# Patient Record
Sex: FEMALE | Race: WHITE | Employment: OTHER | URBAN - METROPOLITAN AREA
[De-identification: names, ages, dates, MRNs, and addresses within clinical notes are randomized per-mention and may not be internally consistent; named-entity substitution may affect disease eponyms.]

---

## 2017-02-10 ENCOUNTER — APPOINTMENT (OUTPATIENT)
Dept: CT IMAGING | Facility: HOSPITAL | Age: 62
End: 2017-02-10
Attending: EMERGENCY MEDICINE
Payer: COMMERCIAL

## 2017-02-10 ENCOUNTER — HOSPITAL ENCOUNTER (EMERGENCY)
Facility: HOSPITAL | Age: 62
Discharge: HOME | End: 2017-02-10
Attending: EMERGENCY MEDICINE
Payer: COMMERCIAL

## 2017-02-10 ENCOUNTER — APPOINTMENT (OUTPATIENT)
Dept: GENERAL RADIOLOGY | Facility: HOSPITAL | Age: 62
End: 2017-02-10
Attending: EMERGENCY MEDICINE
Payer: COMMERCIAL

## 2017-02-10 VITALS
RESPIRATION RATE: 12 BRPM | SYSTOLIC BLOOD PRESSURE: 120 MMHG | WEIGHT: 247 LBS | BODY MASS INDEX: 43.77 KG/M2 | DIASTOLIC BLOOD PRESSURE: 60 MMHG | TEMPERATURE: 97 F | HEIGHT: 63 IN | HEART RATE: 71 BPM | OXYGEN SATURATION: 96 %

## 2017-02-10 DIAGNOSIS — K92.2 GASTROINTESTINAL HEMORRHAGE, UNSPECIFIED GASTROINTESTINAL HEMORRHAGE TYPE: ICD-10-CM

## 2017-02-10 DIAGNOSIS — R10.9 ABDOMINAL PAIN OF UNKNOWN ETIOLOGY: Primary | ICD-10-CM

## 2017-02-10 LAB
ALBUMIN SERPL-MCNC: 3.7 G/DL (ref 3.5–4.8)
ALP LIVER SERPL-CCNC: 128 U/L (ref 50–130)
ALT SERPL-CCNC: 36 U/L (ref 14–54)
ANTIBODY SCREEN: NEGATIVE
APTT PPP: 28.6 SECONDS (ref 25–34)
AST SERPL-CCNC: 32 U/L (ref 15–41)
ATRIAL RATE: 54 BPM
BASOPHILS # BLD AUTO: 0.04 X10(3) UL (ref 0–0.1)
BASOPHILS NFR BLD AUTO: 0.6 %
BILIRUB SERPL-MCNC: 0.5 MG/DL (ref 0.1–2)
BUN BLD-MCNC: 20 MG/DL (ref 8–20)
CALCIUM BLD-MCNC: 8.7 MG/DL (ref 8.3–10.3)
CHLORIDE: 103 MMOL/L (ref 101–111)
CO2: 26 MMOL/L (ref 22–32)
CREAT BLD-MCNC: 1.11 MG/DL (ref 0.55–1.02)
EOSINOPHIL # BLD AUTO: 0.23 X10(3) UL (ref 0–0.3)
EOSINOPHIL NFR BLD AUTO: 3.5 %
ERYTHROCYTE [DISTWIDTH] IN BLOOD BY AUTOMATED COUNT: 14 % (ref 11.5–16)
GLUCOSE BLD-MCNC: 93 MG/DL (ref 70–99)
HCT VFR BLD AUTO: 38.9 % (ref 34–50)
HGB BLD-MCNC: 13 G/DL (ref 12–16)
IMMATURE GRANULOCYTE COUNT: 0.02 X10(3) UL (ref 0–1)
IMMATURE GRANULOCYTE RATIO %: 0.3 %
INR BLD: 1.03 (ref 0.89–1.12)
LIPASE: 185 U/L (ref 73–393)
LYMPHOCYTES # BLD AUTO: 1.32 X10(3) UL (ref 0.9–4)
LYMPHOCYTES NFR BLD AUTO: 20.3 %
M PROTEIN MFR SERPL ELPH: 7.2 G/DL (ref 6.1–8.3)
MCH RBC QN AUTO: 29.6 PG (ref 27–33.2)
MCHC RBC AUTO-ENTMCNC: 33.4 G/DL (ref 31–37)
MCV RBC AUTO: 88.6 FL (ref 81–100)
MONOCYTES # BLD AUTO: 0.7 X10(3) UL (ref 0.1–0.6)
MONOCYTES NFR BLD AUTO: 10.8 %
NEUTROPHIL ABS PRELIM: 4.18 X10 (3) UL (ref 1.3–6.7)
NEUTROPHILS # BLD AUTO: 4.18 X10(3) UL (ref 1.3–6.7)
NEUTROPHILS NFR BLD AUTO: 64.5 %
P AXIS: 61 DEGREES
P-R INTERVAL: 168 MS
PLATELET # BLD AUTO: 185 10(3)UL (ref 150–450)
POTASSIUM SERPL-SCNC: 3.5 MMOL/L (ref 3.6–5.1)
PSA SERPL DL<=0.01 NG/ML-MCNC: 13.8 SECONDS (ref 12.3–14.8)
Q-T INTERVAL: 444 MS
QRS DURATION: 94 MS
QTC CALCULATION (BEZET): 421 MS
R AXIS: 22 DEGREES
RBC # BLD AUTO: 4.39 X10(6)UL (ref 3.8–5.1)
RED CELL DISTRIBUTION WIDTH-SD: 45.2 FL (ref 35.1–46.3)
RH BLOOD TYPE: POSITIVE
SODIUM SERPL-SCNC: 138 MMOL/L (ref 136–144)
T AXIS: 60 DEGREES
VENTRICULAR RATE: 54 BPM
WBC # BLD AUTO: 6.5 X10(3) UL (ref 4–13)

## 2017-02-10 PROCEDURE — 85730 THROMBOPLASTIN TIME PARTIAL: CPT | Performed by: EMERGENCY MEDICINE

## 2017-02-10 PROCEDURE — C9113 INJ PANTOPRAZOLE SODIUM, VIA: HCPCS | Performed by: EMERGENCY MEDICINE

## 2017-02-10 PROCEDURE — 93005 ELECTROCARDIOGRAM TRACING: CPT

## 2017-02-10 PROCEDURE — 85610 PROTHROMBIN TIME: CPT | Performed by: EMERGENCY MEDICINE

## 2017-02-10 PROCEDURE — 85025 COMPLETE CBC W/AUTO DIFF WBC: CPT | Performed by: EMERGENCY MEDICINE

## 2017-02-10 PROCEDURE — 83690 ASSAY OF LIPASE: CPT | Performed by: EMERGENCY MEDICINE

## 2017-02-10 PROCEDURE — 96374 THER/PROPH/DIAG INJ IV PUSH: CPT

## 2017-02-10 PROCEDURE — 93010 ELECTROCARDIOGRAM REPORT: CPT

## 2017-02-10 PROCEDURE — 86850 RBC ANTIBODY SCREEN: CPT | Performed by: EMERGENCY MEDICINE

## 2017-02-10 PROCEDURE — 86900 BLOOD TYPING SEROLOGIC ABO: CPT | Performed by: EMERGENCY MEDICINE

## 2017-02-10 PROCEDURE — 99285 EMERGENCY DEPT VISIT HI MDM: CPT

## 2017-02-10 PROCEDURE — 74177 CT ABD & PELVIS W/CONTRAST: CPT

## 2017-02-10 PROCEDURE — 96361 HYDRATE IV INFUSION ADD-ON: CPT

## 2017-02-10 PROCEDURE — 74000 XR ABDOMEN (KUB) (1 AP VIEW)  (CPT=74000): CPT

## 2017-02-10 PROCEDURE — 96375 TX/PRO/DX INJ NEW DRUG ADDON: CPT

## 2017-02-10 PROCEDURE — 86901 BLOOD TYPING SEROLOGIC RH(D): CPT | Performed by: EMERGENCY MEDICINE

## 2017-02-10 PROCEDURE — 80053 COMPREHEN METABOLIC PANEL: CPT | Performed by: EMERGENCY MEDICINE

## 2017-02-10 PROCEDURE — 82272 OCCULT BLD FECES 1-3 TESTS: CPT

## 2017-02-10 RX ORDER — DICYCLOMINE HCL 20 MG
20 TABLET ORAL 4 TIMES DAILY PRN
Qty: 30 TABLET | Refills: 0 | Status: SHIPPED | OUTPATIENT
Start: 2017-02-10 | End: 2017-03-12

## 2017-02-10 RX ORDER — ONDANSETRON 4 MG/1
4 TABLET, ORALLY DISINTEGRATING ORAL EVERY 4 HOURS PRN
Qty: 20 TABLET | Refills: 0 | Status: SHIPPED | OUTPATIENT
Start: 2017-02-10 | End: 2017-03-14

## 2017-02-10 RX ORDER — PANTOPRAZOLE SODIUM 40 MG/1
40 TABLET, DELAYED RELEASE ORAL DAILY
Qty: 30 TABLET | Refills: 0 | Status: SHIPPED | OUTPATIENT
Start: 2017-02-10 | End: 2017-03-12

## 2017-02-10 RX ORDER — ONDANSETRON 2 MG/ML
4 INJECTION INTRAMUSCULAR; INTRAVENOUS ONCE
Status: COMPLETED | OUTPATIENT
Start: 2017-02-10 | End: 2017-02-10

## 2017-02-10 NOTE — ED PROVIDER NOTES
Patient Seen in: [facility] Emergency Department    History   Patient presents with:  GI Bleeding (gastrointestinal)    Stated Complaint: gi bleed    HPI    This is a 49-year-old female who presents with complaints of nausea, vomiting, 2-3 episodes 05/2003    OTHER SURGICAL HISTORY  06/2009    Comment removal cement piece right knee    OTHER SURGICAL HISTORY  10/2004    Comment extraction of all her teeth    OTHER SURGICAL HISTORY  08/2003 & 8/2013    Comment open abdominal wound repair      6/17/201 Potassium (COZAAR) 25 MG Oral Tab,  Take 25 mg by mouth daily. Oxybutynin Chloride (DITROPAN) 5 MG Oral Tab,  Take 5 mg by mouth daily.    Pantoprazole Sodium (PROTONIX) 40 MG Oral Tab EC,  Take 40 mg by mouth every morning before breakfast.   clonazepam retraction. No crackles. CV: Cardiovascular is regular without murmurs or rubs. ABD: The abdomen is soft nondistended mild diffuse tenderness throughout the abdomen. .  There is no rebound. There is no guarding.     EXT: There is good pulses bilatera Final result                 Please view results for these tests on the individual orders.    BLOOD TYPE, ABO AND RH D   ANTIBODY SCREEN   RAINBOW DRAW BLUE   RAINBOW DRAW GOLD   RAINBOW DRAW LAVENDER   RAINBOW DRAW LIGHT GREEN EC  Take 1 tablet (40 mg total) by mouth daily. Qty: 30 tablet Refills: 0    ondansetron 4 MG Oral Tablet Dispersible  Take 1 tablet (4 mg total) by mouth every 4 (four) hours as needed for Nausea.   Qty: 20 tablet Refills: 0    Dicyclomine HCl 20 MG Oral

## 2017-02-10 NOTE — ED INITIAL ASSESSMENT (HPI)
Pt here with diffuse abdominal pain onset 2d ago with vomiting x 3 total and rectal bleeding red in color x 5. Saw PMD yesterday was told to come to ED. Pt did not have a ride.

## 2017-03-14 ENCOUNTER — HOSPITAL ENCOUNTER (OUTPATIENT)
Facility: HOSPITAL | Age: 62
Setting detail: OBSERVATION
Discharge: HOME | End: 2017-03-16
Attending: EMERGENCY MEDICINE | Admitting: INTERNAL MEDICINE
Payer: COMMERCIAL

## 2017-03-14 ENCOUNTER — APPOINTMENT (OUTPATIENT)
Dept: GENERAL RADIOLOGY | Facility: HOSPITAL | Age: 62
End: 2017-03-14
Attending: EMERGENCY MEDICINE
Payer: COMMERCIAL

## 2017-03-14 DIAGNOSIS — R07.9 CHEST PAIN, RULE OUT ACUTE MYOCARDIAL INFARCTION: Primary | ICD-10-CM

## 2017-03-14 LAB
ALBUMIN SERPL-MCNC: 3.2 G/DL (ref 3.5–4.8)
ALP LIVER SERPL-CCNC: 92 U/L (ref 50–130)
ALT SERPL-CCNC: 23 U/L (ref 14–54)
AST SERPL-CCNC: 27 U/L (ref 15–41)
ATRIAL RATE: 85 BPM
BASOPHILS # BLD AUTO: 0.03 X10(3) UL (ref 0–0.1)
BASOPHILS NFR BLD AUTO: 0.4 %
BILIRUB SERPL-MCNC: 0.8 MG/DL (ref 0.1–2)
BUN BLD-MCNC: 9 MG/DL (ref 8–20)
CALCIUM BLD-MCNC: 9 MG/DL (ref 8.3–10.3)
CHLORIDE: 104 MMOL/L (ref 101–111)
CO2: 25 MMOL/L (ref 22–32)
CREAT BLD-MCNC: 0.88 MG/DL (ref 0.55–1.02)
EOSINOPHIL # BLD AUTO: 0.04 X10(3) UL (ref 0–0.3)
EOSINOPHIL NFR BLD AUTO: 0.6 %
ERYTHROCYTE [DISTWIDTH] IN BLOOD BY AUTOMATED COUNT: 15.2 % (ref 11.5–16)
GLUCOSE BLD-MCNC: 103 MG/DL (ref 70–99)
HCT VFR BLD AUTO: 33.8 % (ref 34–50)
HGB BLD-MCNC: 11.1 G/DL (ref 12–16)
IMMATURE GRANULOCYTE COUNT: 0.02 X10(3) UL (ref 0–1)
IMMATURE GRANULOCYTE RATIO %: 0.3 %
LYMPHOCYTES # BLD AUTO: 1.05 X10(3) UL (ref 0.9–4)
LYMPHOCYTES NFR BLD AUTO: 14.5 %
M PROTEIN MFR SERPL ELPH: 7 G/DL (ref 6.1–8.3)
MCH RBC QN AUTO: 29.2 PG (ref 27–33.2)
MCHC RBC AUTO-ENTMCNC: 32.8 G/DL (ref 31–37)
MCV RBC AUTO: 88.9 FL (ref 81–100)
MONOCYTES # BLD AUTO: 0.94 X10(3) UL (ref 0.1–0.6)
MONOCYTES NFR BLD AUTO: 13 %
NEUTROPHIL ABS PRELIM: 5.15 X10 (3) UL (ref 1.3–6.7)
NEUTROPHILS # BLD AUTO: 5.15 X10(3) UL (ref 1.3–6.7)
NEUTROPHILS NFR BLD AUTO: 71.2 %
P AXIS: 52 DEGREES
P-R INTERVAL: 164 MS
PLATELET # BLD AUTO: 176 10(3)UL (ref 150–450)
POTASSIUM SERPL-SCNC: 3.7 MMOL/L (ref 3.6–5.1)
Q-T INTERVAL: 376 MS
QRS DURATION: 90 MS
QTC CALCULATION (BEZET): 447 MS
R AXIS: 40 DEGREES
RBC # BLD AUTO: 3.8 X10(6)UL (ref 3.8–5.1)
RED CELL DISTRIBUTION WIDTH-SD: 48.2 FL (ref 35.1–46.3)
SODIUM SERPL-SCNC: 138 MMOL/L (ref 136–144)
T AXIS: 67 DEGREES
TROPONIN: <0.046 NG/ML (ref ?–0.05)
TSI SER-ACNC: 1.38 MIU/ML (ref 0.35–5.5)
VENTRICULAR RATE: 85 BPM
WBC # BLD AUTO: 7.2 X10(3) UL (ref 4–13)

## 2017-03-14 PROCEDURE — 85025 COMPLETE CBC W/AUTO DIFF WBC: CPT | Performed by: EMERGENCY MEDICINE

## 2017-03-14 PROCEDURE — 93010 ELECTROCARDIOGRAM REPORT: CPT

## 2017-03-14 PROCEDURE — 99285 EMERGENCY DEPT VISIT HI MDM: CPT

## 2017-03-14 PROCEDURE — 80053 COMPREHEN METABOLIC PANEL: CPT | Performed by: EMERGENCY MEDICINE

## 2017-03-14 PROCEDURE — 71020 XR CHEST PA + LAT CHEST (CPT=71020): CPT

## 2017-03-14 PROCEDURE — 36415 COLL VENOUS BLD VENIPUNCTURE: CPT

## 2017-03-14 PROCEDURE — 84443 ASSAY THYROID STIM HORMONE: CPT | Performed by: INTERNAL MEDICINE

## 2017-03-14 PROCEDURE — 93005 ELECTROCARDIOGRAM TRACING: CPT

## 2017-03-14 PROCEDURE — 84484 ASSAY OF TROPONIN QUANT: CPT | Performed by: EMERGENCY MEDICINE

## 2017-03-14 RX ORDER — MELATONIN
325
Status: DISCONTINUED | OUTPATIENT
Start: 2017-03-15 | End: 2017-03-16

## 2017-03-14 RX ORDER — CLONAZEPAM 0.5 MG/1
0.5 TABLET ORAL 2 TIMES DAILY PRN
Status: DISCONTINUED | OUTPATIENT
Start: 2017-03-14 | End: 2017-03-16

## 2017-03-14 RX ORDER — TEMAZEPAM 15 MG/1
30 CAPSULE ORAL NIGHTLY PRN
Status: DISCONTINUED | OUTPATIENT
Start: 2017-03-14 | End: 2017-03-16

## 2017-03-14 RX ORDER — METHYLPHENIDATE HYDROCHLORIDE 10 MG/1
10 TABLET ORAL 3 TIMES DAILY
Status: DISCONTINUED | OUTPATIENT
Start: 2017-03-14 | End: 2017-03-16

## 2017-03-14 RX ORDER — SPIRONOLACTONE 25 MG/1
25 TABLET ORAL DAILY
Status: DISCONTINUED | OUTPATIENT
Start: 2017-03-14 | End: 2017-03-16

## 2017-03-14 RX ORDER — MELATONIN
325
COMMUNITY
End: 2018-01-09 | Stop reason: ALTCHOICE

## 2017-03-14 RX ORDER — OXYCODONE AND ACETAMINOPHEN 10; 325 MG/1; MG/1
1 TABLET ORAL EVERY 6 HOURS PRN
Status: DISCONTINUED | OUTPATIENT
Start: 2017-03-14 | End: 2017-03-16

## 2017-03-14 RX ORDER — QUETIAPINE 100 MG/1
400 TABLET, FILM COATED ORAL NIGHTLY
Status: DISCONTINUED | OUTPATIENT
Start: 2017-03-14 | End: 2017-03-16

## 2017-03-14 RX ORDER — MULTIPLE VITAMINS W/ MINERALS TAB 9MG-400MCG
1 TAB ORAL DAILY
Status: DISCONTINUED | OUTPATIENT
Start: 2017-03-14 | End: 2017-03-16

## 2017-03-14 RX ORDER — ESCITALOPRAM OXALATE 20 MG/1
20 TABLET ORAL EVERY MORNING
Status: DISCONTINUED | OUTPATIENT
Start: 2017-03-14 | End: 2017-03-16

## 2017-03-14 RX ORDER — LAMOTRIGINE 100 MG/1
400 TABLET ORAL NIGHTLY
Status: DISCONTINUED | OUTPATIENT
Start: 2017-03-14 | End: 2017-03-16

## 2017-03-14 RX ORDER — OXYBUTYNIN CHLORIDE 5 MG/1
5 TABLET ORAL DAILY
Status: DISCONTINUED | OUTPATIENT
Start: 2017-03-14 | End: 2017-03-16

## 2017-03-14 RX ORDER — LOSARTAN POTASSIUM 25 MG/1
25 TABLET ORAL DAILY
Status: DISCONTINUED | OUTPATIENT
Start: 2017-03-14 | End: 2017-03-16

## 2017-03-14 RX ORDER — GABAPENTIN 300 MG/1
300 CAPSULE ORAL 4 TIMES DAILY
Status: DISCONTINUED | OUTPATIENT
Start: 2017-03-14 | End: 2017-03-16

## 2017-03-14 RX ORDER — NITROGLYCERIN 0.4 MG/1
0.4 TABLET SUBLINGUAL ONCE
Status: COMPLETED | OUTPATIENT
Start: 2017-03-14 | End: 2017-03-14

## 2017-03-14 RX ORDER — CYANOCOBALAMIN 1000 UG/ML
1000 INJECTION INTRAMUSCULAR; SUBCUTANEOUS
Status: DISCONTINUED | OUTPATIENT
Start: 2017-03-28 | End: 2017-03-16

## 2017-03-14 RX ORDER — LEVOTHYROXINE SODIUM 0.07 MG/1
37.5 TABLET ORAL
Status: DISCONTINUED | OUTPATIENT
Start: 2017-03-14 | End: 2017-03-16

## 2017-03-14 RX ORDER — POTASSIUM CHLORIDE 20 MEQ/1
20 TABLET, EXTENDED RELEASE ORAL 4 TIMES DAILY
Status: DISCONTINUED | OUTPATIENT
Start: 2017-03-14 | End: 2017-03-16

## 2017-03-14 RX ORDER — PANTOPRAZOLE SODIUM 40 MG/1
40 TABLET, DELAYED RELEASE ORAL
Status: DISCONTINUED | OUTPATIENT
Start: 2017-03-15 | End: 2017-03-16

## 2017-03-14 RX ORDER — BUDESONIDE AND FORMOTEROL FUMARATE DIHYDRATE 160; 4.5 UG/1; UG/1
2 AEROSOL RESPIRATORY (INHALATION) 2 TIMES DAILY
COMMUNITY
End: 2017-08-08

## 2017-03-14 RX ORDER — ATORVASTATIN CALCIUM 20 MG/1
20 TABLET, FILM COATED ORAL NIGHTLY
Status: DISCONTINUED | OUTPATIENT
Start: 2017-03-14 | End: 2017-03-16

## 2017-03-14 RX ORDER — CALCIUM CARBONATE/VITAMIN D3 600 MG-10
1 TABLET ORAL DAILY
COMMUNITY
End: 2017-08-08

## 2017-03-14 RX ORDER — TORSEMIDE 20 MG/1
20 TABLET ORAL
Status: DISCONTINUED | OUTPATIENT
Start: 2017-03-14 | End: 2017-03-16

## 2017-03-14 NOTE — ED PROVIDER NOTES
Patient Seen in: [facility] Emergency Department    History   No chief complaint on file. Stated Complaint: chest pain    HPI    49-year-old white female who presents emerged from today for quite of chest pain.   Patient reports she has been havin HISTORY  08/2003 & 8/2013    Comment open abdominal wound repair      6/17/2014    Comment Procedure: LUMBAR LAMINECTOMY POST LAT INTERBODY FUS 1 LEVEL;  Surgeon: Zulma Gordon MD;  Location: [address] [place] [address] OR      6/17/2014    Comment Procedure: Nahed Fernandez 2 (two) times daily as needed for Anxiety. lamoTRIgine (LAMICTAL) 200 MG Oral Tab,  Take 400 mg by mouth daily.        Family History   Problem Relation Age of Onset   • Hypertension Father    • Heart Disorder Father    • Hypertension Mother    • Diabet rashes noted on skin exam.      ED Course     Labs Reviewed   COMP METABOLIC PANEL (14) - Abnormal; Notable for the following:     Glucose 103 (*)     Albumin 3.2 (*)     All other components within normal limits   CBC W/ DIFFERENTIAL - Abnormal; Notable f them as well. She will be admitted to a cardiac telemetry bed for further workup treatment and evaluation of her chest pain.         Disposition and Plan     Clinical Impression:  Chest pain, rule out acute myocardial infarction  (primary encounter diagnos

## 2017-03-14 NOTE — HISTORICAL OFFICE NOTE
SHIRA LEE  : 1955  MRNO:  [id number]  [phone]  PCP: Dr. Richardson     TODAY'S DATE: 2007     CHIEF COMPLAINT: Followup of Aortic insufficiency, Followup of Family history of cardiovascular disease, Followup of Hypertension and 600 Mg, as dirtected 600mgam 900mg pm; Ultram 50 Mg, 1 PO Q 4-6 HRS PRN; Xanax 0.5 Mg, as directed    VITAL SIGNS: B/P - 142/82, Pulse - 68, Respiration - 16, Weight - 250.00  CONS: well developed, well nourished, obese and pale.  HEAD/FACE: no trauma and n care.

## 2017-03-14 NOTE — PLAN OF CARE
Rec via cart from ER pt and VSS  Denies CP/SOB sat maintained in mid 90's on RA  Dr. Kassy Fenton at bedside  Dr. Sheron Chavez notified of pt arrival he will put in orders. Needs met call light in reach will cont to monitor this shift.

## 2017-03-14 NOTE — CONSULTS
[facility]  Cardiology Consultation    Genoveva Sep Patient Status:  Observation    1955 MRN [id number]   [facility] 8NE-A Attending Rodolfo Bailey MD   Hosp Day # 0 PCP Any Lee MD     Reason for Consultation:  Chest pain 2004 & 2004    UPPER GI ENDOSCOPY,EXAM  2006 & 2007    GASTRIC BYPASS,OBESITY,SB RECONSTRUC  2000    HERNIA SURGERY  2003    Comment ventral & abdominoplasty      1980,1983,1984,    TUBAL LIGATION  1984    DILATION/CURETTA output data in the 24 hours ending 03/14/17 1644  Wt Readings from Last 3 Encounters:  03/14/17 : 250 lb 10.6 oz (113.7 kg)  02/10/17 : 247 lb (112.038 kg)  11/30/15 : 257 lb (116.574 kg)      Physical Exam:   General: Alert and oriented x 3.  No apparent d allowing me to participate in the care of your patient.     Liza Platt MD  3/14/2017  4:44 PM

## 2017-03-15 ENCOUNTER — APPOINTMENT (OUTPATIENT)
Dept: CV DIAGNOSTICS | Facility: HOSPITAL | Age: 62
End: 2017-03-15
Attending: INTERNAL MEDICINE
Payer: COMMERCIAL

## 2017-03-15 LAB
CHOLEST SMN-MCNC: 120 MG/DL (ref ?–200)
HDLC SERPL-MCNC: 44 MG/DL (ref 45–?)
HDLC SERPL: 2.73 {RATIO} (ref ?–4.44)
LDLC SERPL CALC-MCNC: 58 MG/DL (ref ?–130)
NONHDLC SERPL-MCNC: 76 MG/DL (ref ?–130)
TRIGLYCERIDES: 92 MG/DL (ref ?–150)
VLDL: 18 MG/DL (ref 5–40)

## 2017-03-15 PROCEDURE — 93017 CV STRESS TEST TRACING ONLY: CPT

## 2017-03-15 PROCEDURE — 97162 PT EVAL MOD COMPLEX 30 MIN: CPT

## 2017-03-15 PROCEDURE — 93306 TTE W/DOPPLER COMPLETE: CPT

## 2017-03-15 PROCEDURE — 94640 AIRWAY INHALATION TREATMENT: CPT

## 2017-03-15 PROCEDURE — 93306 TTE W/DOPPLER COMPLETE: CPT | Performed by: INTERNAL MEDICINE

## 2017-03-15 PROCEDURE — 87147 CULTURE TYPE IMMUNOLOGIC: CPT | Performed by: INTERNAL MEDICINE

## 2017-03-15 PROCEDURE — 97530 THERAPEUTIC ACTIVITIES: CPT

## 2017-03-15 PROCEDURE — 80061 LIPID PANEL: CPT | Performed by: INTERNAL MEDICINE

## 2017-03-15 PROCEDURE — 87081 CULTURE SCREEN ONLY: CPT | Performed by: INTERNAL MEDICINE

## 2017-03-15 PROCEDURE — 96372 THER/PROPH/DIAG INJ SC/IM: CPT

## 2017-03-15 PROCEDURE — 78452 HT MUSCLE IMAGE SPECT MULT: CPT

## 2017-03-15 RX ORDER — HEPARIN SODIUM 5000 [USP'U]/ML
7500 INJECTION, SOLUTION INTRAVENOUS; SUBCUTANEOUS EVERY 12 HOURS SCHEDULED
Status: DISCONTINUED | OUTPATIENT
Start: 2017-03-15 | End: 2017-03-16

## 2017-03-15 RX ORDER — DICYCLOMINE HYDROCHLORIDE 10 MG/1
10 CAPSULE ORAL
Status: DISCONTINUED | OUTPATIENT
Start: 2017-03-15 | End: 2017-03-16

## 2017-03-15 NOTE — PROGRESS NOTES
[facility]  Cardiology Progress Note    Subjective:  No chest pain or shortness of breath.     Objective:  /97 mmHg  Pulse 63  Temp(Src) 98.1 °F (36.7 °C) (Oral)  Resp 20  Ht 5' 4\" (1.626 m)  Wt 250 lb 10.6 oz (113.7 kg)  BMI 43.01 kg/m2  SpO2 9 negative for reversible ischemia w/ EF ~70%. Echo still pending. Dr. Sarah Herrera to evaluate later today. Chart reviewed. Noted pending GI consult.     DARVIN Moore      =======================================================  Patient seen and examined

## 2017-03-15 NOTE — PROGRESS NOTES
PRELIMINARY CARDIACDIAGNOSTICS STRESS TESTING RESULTS      Inpatient nuclear Nayana Monique ordered by Dr. Li Balderas, [facility]. Resting EKG NSR, PRWP, NSSTTW changes. Resting vitals 60, 99/66.     Stationary Lexiscan protocol implemented, with 0.4mg Radha

## 2017-03-15 NOTE — PLAN OF CARE
Altered Communication/Language Barrier    • Patient/Family is able to understand and participate in their care Progressing        CARDIOVASCULAR - ADULT    • Maintains optimal cardiac output and hemodynamic stability Progressing    • Absence of cardiac arr

## 2017-03-15 NOTE — CONSULTS
[facility]                       Gastroenterology [address] [facility] Gastroenterology    Tracey Jordi Patient Status:  Observation    1955 MRN [id number]   [facility] 8NE-A Attending Judson Rendon MD   [facility] Day # 1 PCP Rodrigo [facility]  SECTION LEVEL I  1980    Paola Hartman  3/2002    Comment kidney stone removed    FOOT SURGERY  2003    Comment Rt. foot fx. repair    FOOT SURGERY  2002    Comment neuroma let foot    KNEE SURGERY      Comment left knee 1000 MCG/ML injection 1,000 mcg 1,000 mcg Intramuscular Q30 Days   gabapentin (NEURONTIN) cap 300 mg 300 mg Oral QID   methylphenidate tab 10 mg 10 mg Oral TID   Potassium Chloride ER (K-DUR M20) CR tab 20 mEq 20 mEq Oral QID   QUEtiapine Fumarate (SEROQUE Rheumatologic: The patient reports no history of chronic arthritis, myalgias, arthralgias            Genitourinary:  The patient reports no history of recurrent urinary tract infections, hematuria, dysuria, or nephrolithiasis           Psychiatric RUIAH , CT ABDOMEN PELVIS IV AND ORAL CONTRAST (ER), 11/30/2015, 13:04.      INDICATIONS:  gi bleed      TECHNIQUE:  CT scanning was performed from the dome of the diaphragm to the pubic symphysis with non-ionic intravenous contrast material. Post contras MD    ___________________________________________________________    ESOPHAGOGASTRODUODENOSCOPY: 1/2012: Dr Rohan Freire DIAGNOSIS:  Abdominal pain, episode and left upper  quadrant. POSTOPERATIVE DIAGNOSIS:  Normal postsurgical anatomy.   ________

## 2017-03-15 NOTE — H&P
[facility]    PATIENT'S NAME: Esther Reardon   ATTENDING PHYSICIAN: Elenita Kruger M.D.    PATIENT ACCOUNT#:   [de-identified]    LOCATION:  [address] [place] [address]  MEDICAL RECORD #:   [id number]       YOB: 1955  ADMISSION DATE:       03/14/2017 guarding. EXTREMITIES:  No cyanosis or icterus. NEUROLOGIC:  Generalized weakness. No focal deficit. JOINTS:  No apparent acute fracture. SKIN:  No apparent acute ulcer. LABORATORY DATA:  BUN 9, creatinine 0.88. Troponin negative.   WBC 7.2, hemog

## 2017-03-15 NOTE — PAYOR COMM NOTE
Attending Physician: Akira Yuen MD    Review Type: ADMISSION   Reviewer: Mabel Pack       Date: March 15, 2017 - 9:25 AM  Payor: [employer] CHOICE/HMO/POS/EPO  Authorization Number: [id number]/A  Admit date: 3/14/2017 12:17 PM   Admitted from [facility] TOTAL KNEE REPLACEMENT  Right  01/2009      CHOLECYSTECTOMY    10/1980      Comment  open      COLECTOMY    06/2004 & 11/2004      UPPER GI ENDOSCOPY,EXAM    04/2006 & 07/2007      GASTRIC BYPASS,OBESITY,SB RECONSTRUC    8/2000      HERNIA SURGERY    06/20 nightly.   spironolactone (ALDACTONE) 25 MG Oral Tab,  Take 25 mg by mouth daily.   simvastatin (ZOCOR) 40 MG Oral Tab,  Take 40 mg by mouth nightly.   Levothyroxine Sodium (SYNTHROID, LEVOTHROID) 25 MCG Oral Tab,  Take 37.5 mcg by mouth before breakfast. sitting on the gurney, she is awake and alert and appears in no acute discomfort or distress. Vital signs are stable she is afebrile    Head is normocephalic atraumatic conjunctiva is clear.  Sclerae anicteric.  Neck is supple.     Lungs are clear to [employer] Chest x-ray reveals:  FINDINGS:          Heart is enlarged. Mild interstitial prominence bilaterally with trace effusions. No focal infiltrate.    Impression:       CONCLUSION:       Suggestive of mild interstitial edema with trace effusions.  No focal in [facility]  Cardiology Consultation      Byron Linda [facility] Patient Status:  Maya Castaneda [birth year]  [facility] [id number]    [facility] 8NE-A  Attending  Deepthi Stack MD    Hosp Day #  0  PCP  Melinda Rea MD      Reason for Consult Right  01/2009      CHOLECYSTECTOMY    10/1980      Comment  open      COLECTOMY    06/2004 & 11/2004      UPPER GI ENDOSCOPY,EXAM    04/2006 & 07/2007      GASTRIC BYPASS,OBESITY,SB RECONSTRUC    8/2000      HERNIA SURGERY    06/2003      Comment  ventral 10.6 oz (113.7 kg)  BMI 43.01 kg/m2  SpO2 93%  Temp (24hrs), Av.1 °F (36.7 °C), Min:97 °F (36.1 °C), Max:99.2 °F (37.3 °C)     No intake or output data in the 24 hours ending 17 1644  Wt Readings from Last 3 Encounters:  17 : 250 lb 10.6 oz primary service    -if positive stress shows signs of ischemia would proceed with left heart cardiac catheterization for evaluation for obstructive CAD as cause of symptoms    Thank you for allowing me to participate in the care of your patient.     Esdras Daily

## 2017-03-15 NOTE — HOME CARE LIAISON
Referral received for [facility]. Met with pt to discuss, pt declines at this time.

## 2017-03-15 NOTE — PLAN OF CARE
Sat noted to be 85% on RA while ambulating in [place] with PT/OT  MD/CM/SW aware  Sats at rest noted to be 90% on RA  Denies CP will cont to monitor this shift  Awaiting Lexiscan

## 2017-03-15 NOTE — PROGRESS NOTES
[facility] Progress Note:  Anticoagulation Weight Dose Adjustment for heparin    Kristi Jaime is a 64year old female who has been prescribed heparin for VTE prophylaxis. Estimated Creatinine Clearance: 58 mL/min (based on Cr of 0.88).     Wt Readin

## 2017-03-15 NOTE — PHYSICAL THERAPY NOTE
PHYSICAL THERAPY EVALUATION - INPATIENT     Room Number: 1929/4411-A  Evaluation Date: 3/15/2017  Type of Evaluation: Initial  Physician Order: PT Eval and Treat    Presenting Problem:  (Chest pain due to acute MI, H/O High BP, Back Problems)  Reason f LEVEL;  Surgeon: Jose Astorga MD;  Location: [address] [place] [address] OR      6/17/2014    Comment Procedure: INTRAOPERATIVE NEURO MONITORING;  Surgeon: Jose Astorga MD;  Location: [address] [place] [address] OR       HOME SITUATION  Type of Home: House   Home Layout: Two level  S much difficulty does the patient currently have. ..  -   Turning over in bed (including adjusting bedclothes, sheets and blankets)?: None   -   Sitting down on and standing up from a chair with arms (e.g., wheelchair, bedside commode, etc.): None   -   Movi evaluation    Patient End of Session: In bed; With [facility] staff;Needs met;Call light within reach;RN aware of session/findings; All patient questions and concerns addressed    ASSESSMENT     Patient is a 64year old female admitted 3/14/2017 for chest pain due t device: none at assistance level: supervision     Goal #4 Pt will be independent for short distance amb for 50ft around the house   Goal #5 Pt will be able to negotiate one flight of stairs with HR/Close SBAx1   Goal #6    Goal Comments: Goals established

## 2017-03-16 VITALS
BODY MASS INDEX: 42.8 KG/M2 | SYSTOLIC BLOOD PRESSURE: 146 MMHG | HEIGHT: 64 IN | HEART RATE: 81 BPM | DIASTOLIC BLOOD PRESSURE: 77 MMHG | TEMPERATURE: 97 F | OXYGEN SATURATION: 96 % | RESPIRATION RATE: 20 BRPM | WEIGHT: 250.69 LBS

## 2017-03-16 PROCEDURE — 87147 CULTURE TYPE IMMUNOLOGIC: CPT | Performed by: INTERNAL MEDICINE

## 2017-03-16 PROCEDURE — 96372 THER/PROPH/DIAG INJ SC/IM: CPT

## 2017-03-16 PROCEDURE — 87081 CULTURE SCREEN ONLY: CPT | Performed by: INTERNAL MEDICINE

## 2017-03-16 RX ORDER — DICYCLOMINE HYDROCHLORIDE 10 MG/1
10 CAPSULE ORAL
Qty: 90 CAPSULE | Refills: 2 | Status: SHIPPED | OUTPATIENT
Start: 2017-03-16 | End: 2017-08-08

## 2017-03-16 RX ORDER — DICYCLOMINE HYDROCHLORIDE 10 MG/1
10 CAPSULE ORAL
Qty: 90 CAPSULE | Refills: 2 | Status: SHIPPED | OUTPATIENT
Start: 2017-03-16 | End: 2017-03-16

## 2017-03-16 NOTE — PROGRESS NOTES
[facility]  Progress Note    Alexei Viera Patient Status:  Observation    1955 MRN [id number]   [facility] 8NE-A Attending Falguni Bugrer MD   Hosp Day # 2 PCP Nikita Hill MD         SUBJECTIVE:  Subjective:  Alexei Viera is Daily   • Oxybutynin Chloride  5 mg Oral Daily   • Pantoprazole Sodium  40 mg Oral QAM AC   • lamoTRIgine  400 mg Oral Nightly   • escitalopram  20 mg Oral QAM   • [START ON 3/28/2017] cyanocobalamin  1,000 mcg Intramuscular Q30 Days   • gabapentin  300 mg b.i.d.  12.    Anemia, most likely anemia of chronic disease. 13.    Disposition:  Per .     See tests ordered,  Available and radiology reviewed  All consultant notes reviewed  Discussed with nursing on floor  dvt prophylaxis reviewed  PT and

## 2017-03-16 NOTE — CONSULTS
Attending physician addendum:   I personally saw and examined the patient, agree with the above findings, assessment and plan with SIERRA Leblanc. Ms. Samantha Tracey is a 64year-old female who is being seen in consultation for abdominal pain. She obese.  Her ab

## 2017-03-16 NOTE — PHYSICAL THERAPY NOTE
Attempted to see patient twice. First time, pt was taking shower and now pt is waiting for breakfast and pt stated then she will be going home.  Discussed with pt that she should try to practice stairs with PT since pt has one flight of stairs at home but p

## 2017-03-16 NOTE — PLAN OF CARE
Problem: CARDIOVASCULAR - ADULT  Goal: Maintains optimal cardiac output and hemodynamic stability  INTERVENTIONS:  - Monitor vital signs, rhythm, and trends  - Monitor for bleeding, hypotension and signs of decreased cardiac output  - Evaluate effectivenes Assess pt frequently for physical needs  - Identify cognitive and physical deficits and behaviors that affect risk of falls.   - [place] fall precautions as indicated by assessment.  - Educate pt/family on patient safety including physical limitations  -

## 2017-03-16 NOTE — PLAN OF CARE
NURSING DISCHARGE NOTE    Discharged Home via Wheelchair. Accompanied by Friend  Belongings Taken by patient/family     Discharge instructions reviewed with patient including follow up appointments and new medications.

## 2017-03-18 NOTE — DISCHARGE SUMMARY
[facility]  Discharge Summary    Mykel Hope Patient Status:  Observation    1955 MRN [id number]   [facility] 8NE-A Attending No att. providers found   Hosp Day # 2 PCP Benjy Serrato MD     Date of Admission: 3/14/2017    Date                                   Data Review:        Labs:        Recent Labs    Lab  03/14/17   1220    WBC   7.2    HGB   11.1*    MCV   88.9    PLT   176.0          Recent Labs    Lab  03/14/17   1220    NA   317    K   3.7    CL   776    CO2   25.0  for a while.  This could be chronic, but would defer to GI for any further workup inpatient versus outpatient.   3.      Depression.  The patient has been on several medications from outpatient psychiatrist including Celexa, Seroquel, clonazepam, Lamictal, times daily. , Historical    oxyCODONE-acetaminophen (PERCOCET)  MG Oral Tab  Take 1 tablet by mouth every 4 (four) hours as needed for Pain., Historical    Citalopram Hydrobromide (CELEXA) 40 MG Oral Tab  Take 80 mg by mouth daily. , Historical    cya

## 2017-07-19 ENCOUNTER — LAB ENCOUNTER (OUTPATIENT)
Dept: LAB | Age: 62
End: 2017-07-19
Attending: SPECIALIST
Payer: COMMERCIAL

## 2017-07-19 DIAGNOSIS — I10 ESSENTIAL (PRIMARY) HYPERTENSION: Primary | ICD-10-CM

## 2017-07-19 DIAGNOSIS — G89.29 OTHER CHRONIC PAIN: ICD-10-CM

## 2017-07-19 DIAGNOSIS — D51.3 OTHER DIETARY VITAMIN B12 DEFICIENCY ANEMIA: ICD-10-CM

## 2017-07-19 LAB
25-HYDROXYVITAMIN D (TOTAL): 7.1 NG/ML (ref 30–100)
ALBUMIN SERPL-MCNC: 4 G/DL (ref 3.5–4.8)
ALP LIVER SERPL-CCNC: 117 U/L (ref 50–130)
ALT SERPL-CCNC: 29 U/L (ref 14–54)
AST SERPL-CCNC: 26 U/L (ref 15–41)
BASOPHILS # BLD AUTO: 0.03 X10(3) UL (ref 0–0.1)
BASOPHILS NFR BLD AUTO: 0.4 %
BILIRUB SERPL-MCNC: 0.4 MG/DL (ref 0.1–2)
BUN BLD-MCNC: 18 MG/DL (ref 8–20)
CALCIUM BLD-MCNC: 9 MG/DL (ref 8.3–10.3)
CHLORIDE: 105 MMOL/L (ref 101–111)
CHOLEST SMN-MCNC: 154 MG/DL (ref ?–200)
CO2: 28 MMOL/L (ref 22–32)
CREAT BLD-MCNC: 1.1 MG/DL (ref 0.55–1.02)
EOSINOPHIL # BLD AUTO: 0.11 X10(3) UL (ref 0–0.3)
EOSINOPHIL NFR BLD AUTO: 1.6 %
ERYTHROCYTE [DISTWIDTH] IN BLOOD BY AUTOMATED COUNT: 14.7 % (ref 11.5–16)
EST. AVERAGE GLUCOSE BLD GHB EST-MCNC: 114 MG/DL (ref 68–126)
FREE T4: 0.7 NG/DL (ref 0.9–1.8)
GLUCOSE BLD-MCNC: 116 MG/DL (ref 70–99)
HAV AB SERPL IA-ACNC: 1415 PG/ML (ref 193–986)
HBA1C MFR BLD HPLC: 5.6 % (ref ?–5.7)
HCT VFR BLD AUTO: 45.1 % (ref 34–50)
HDLC SERPL-MCNC: 48 MG/DL (ref 45–?)
HDLC SERPL: 3.21 {RATIO} (ref ?–4.44)
HGB BLD-MCNC: 14 G/DL (ref 12–16)
IMMATURE GRANULOCYTE COUNT: 0.01 X10(3) UL (ref 0–1)
IMMATURE GRANULOCYTE RATIO %: 0.1 %
LDLC SERPL CALC-MCNC: 75 MG/DL (ref ?–130)
LDLC SERPL-MCNC: 31 MG/DL (ref 5–40)
LYMPHOCYTES # BLD AUTO: 1.34 X10(3) UL (ref 0.9–4)
LYMPHOCYTES NFR BLD AUTO: 19.8 %
M PROTEIN MFR SERPL ELPH: 7.5 G/DL (ref 6.1–8.3)
MCH RBC QN AUTO: 27.9 PG (ref 27–33.2)
MCHC RBC AUTO-ENTMCNC: 31 G/DL (ref 31–37)
MCV RBC AUTO: 89.8 FL (ref 81–100)
MONOCYTES # BLD AUTO: 0.82 X10(3) UL (ref 0.1–0.6)
MONOCYTES NFR BLD AUTO: 12.1 %
NEUTROPHIL ABS PRELIM: 4.46 X10 (3) UL (ref 1.3–6.7)
NEUTROPHILS # BLD AUTO: 4.46 X10(3) UL (ref 1.3–6.7)
NEUTROPHILS NFR BLD AUTO: 66 %
NONHDLC SERPL-MCNC: 106 MG/DL (ref ?–130)
PLATELET # BLD AUTO: 257 10(3)UL (ref 150–450)
POTASSIUM SERPL-SCNC: 4.8 MMOL/L (ref 3.6–5.1)
RBC # BLD AUTO: 5.02 X10(6)UL (ref 3.8–5.1)
RED CELL DISTRIBUTION WIDTH-SD: 48.1 FL (ref 35.1–46.3)
SODIUM SERPL-SCNC: 139 MMOL/L (ref 136–144)
TRIGLYCERIDES: 155 MG/DL (ref ?–150)
TSI SER-ACNC: 1.83 MIU/ML (ref 0.35–5.5)
WBC # BLD AUTO: 6.8 X10(3) UL (ref 4–13)

## 2017-07-19 PROCEDURE — 85025 COMPLETE CBC W/AUTO DIFF WBC: CPT

## 2017-07-19 PROCEDURE — 80053 COMPREHEN METABOLIC PANEL: CPT

## 2017-07-19 PROCEDURE — 84439 ASSAY OF FREE THYROXINE: CPT

## 2017-07-19 PROCEDURE — 82306 VITAMIN D 25 HYDROXY: CPT

## 2017-07-19 PROCEDURE — 83036 HEMOGLOBIN GLYCOSYLATED A1C: CPT

## 2017-07-19 PROCEDURE — 80061 LIPID PANEL: CPT

## 2017-07-19 PROCEDURE — 84443 ASSAY THYROID STIM HORMONE: CPT

## 2017-07-19 PROCEDURE — 82607 VITAMIN B-12: CPT

## 2017-07-19 PROCEDURE — 36415 COLL VENOUS BLD VENIPUNCTURE: CPT

## 2017-08-07 ENCOUNTER — APPOINTMENT (OUTPATIENT)
Dept: CT IMAGING | Facility: HOSPITAL | Age: 62
End: 2017-08-07
Attending: PHYSICIAN ASSISTANT
Payer: COMMERCIAL

## 2017-08-07 ENCOUNTER — HOSPITAL ENCOUNTER (EMERGENCY)
Facility: HOSPITAL | Age: 62
Discharge: HOME | End: 2017-08-07
Attending: EMERGENCY MEDICINE
Payer: COMMERCIAL

## 2017-08-07 VITALS
OXYGEN SATURATION: 97 % | HEIGHT: 63 IN | HEART RATE: 67 BPM | DIASTOLIC BLOOD PRESSURE: 45 MMHG | TEMPERATURE: 98 F | RESPIRATION RATE: 16 BRPM | SYSTOLIC BLOOD PRESSURE: 134 MMHG | BODY MASS INDEX: 42.88 KG/M2 | WEIGHT: 242 LBS

## 2017-08-07 DIAGNOSIS — R11.2 NAUSEA AND VOMITING IN ADULT: Primary | ICD-10-CM

## 2017-08-07 LAB
ALBUMIN SERPL-MCNC: 3.7 G/DL (ref 3.5–4.8)
ALP LIVER SERPL-CCNC: 132 U/L (ref 50–130)
ALT SERPL-CCNC: 28 U/L (ref 14–54)
AST SERPL-CCNC: 27 U/L (ref 15–41)
BASOPHILS # BLD AUTO: 0.02 X10(3) UL (ref 0–0.1)
BASOPHILS NFR BLD AUTO: 0.3 %
BILIRUB SERPL-MCNC: 0.4 MG/DL (ref 0.1–2)
BUN BLD-MCNC: 12 MG/DL (ref 8–20)
CALCIUM BLD-MCNC: 9.5 MG/DL (ref 8.3–10.3)
CHLORIDE: 108 MMOL/L (ref 101–111)
CO2: 23 MMOL/L (ref 22–32)
CREAT BLD-MCNC: 0.84 MG/DL (ref 0.55–1.02)
EOSINOPHIL # BLD AUTO: 0.02 X10(3) UL (ref 0–0.3)
EOSINOPHIL NFR BLD AUTO: 0.3 %
ERYTHROCYTE [DISTWIDTH] IN BLOOD BY AUTOMATED COUNT: 14.5 % (ref 11.5–16)
GLUCOSE BLD-MCNC: 105 MG/DL (ref 70–99)
HCT VFR BLD AUTO: 39.2 % (ref 34–50)
HGB BLD-MCNC: 12.8 G/DL (ref 12–16)
IMMATURE GRANULOCYTE COUNT: 0.03 X10(3) UL (ref 0–1)
IMMATURE GRANULOCYTE RATIO %: 0.4 %
LIPASE: 148 U/L (ref 73–393)
LYMPHOCYTES # BLD AUTO: 1 X10(3) UL (ref 0.9–4)
LYMPHOCYTES NFR BLD AUTO: 13.8 %
M PROTEIN MFR SERPL ELPH: 7.1 G/DL (ref 6.1–8.3)
MCH RBC QN AUTO: 28.3 PG (ref 27–33.2)
MCHC RBC AUTO-ENTMCNC: 32.7 G/DL (ref 31–37)
MCV RBC AUTO: 86.7 FL (ref 81–100)
MONOCYTES # BLD AUTO: 0.69 X10(3) UL (ref 0.1–0.6)
MONOCYTES NFR BLD AUTO: 9.5 %
NEUTROPHIL ABS PRELIM: 5.47 X10 (3) UL (ref 1.3–6.7)
NEUTROPHILS # BLD AUTO: 5.47 X10(3) UL (ref 1.3–6.7)
NEUTROPHILS NFR BLD AUTO: 75.7 %
PLATELET # BLD AUTO: 259 10(3)UL (ref 150–450)
POTASSIUM SERPL-SCNC: 3.4 MMOL/L (ref 3.6–5.1)
RBC # BLD AUTO: 4.52 X10(6)UL (ref 3.8–5.1)
RED CELL DISTRIBUTION WIDTH-SD: 45.2 FL (ref 35.1–46.3)
SODIUM SERPL-SCNC: 143 MMOL/L (ref 136–144)
WBC # BLD AUTO: 7.2 X10(3) UL (ref 4–13)

## 2017-08-07 PROCEDURE — 83690 ASSAY OF LIPASE: CPT | Performed by: PHYSICIAN ASSISTANT

## 2017-08-07 PROCEDURE — 99284 EMERGENCY DEPT VISIT MOD MDM: CPT

## 2017-08-07 PROCEDURE — 80053 COMPREHEN METABOLIC PANEL: CPT | Performed by: EMERGENCY MEDICINE

## 2017-08-07 PROCEDURE — 96361 HYDRATE IV INFUSION ADD-ON: CPT

## 2017-08-07 PROCEDURE — 74177 CT ABD & PELVIS W/CONTRAST: CPT | Performed by: PHYSICIAN ASSISTANT

## 2017-08-07 PROCEDURE — 96374 THER/PROPH/DIAG INJ IV PUSH: CPT

## 2017-08-07 PROCEDURE — 85025 COMPLETE CBC W/AUTO DIFF WBC: CPT | Performed by: EMERGENCY MEDICINE

## 2017-08-07 PROCEDURE — 96375 TX/PRO/DX INJ NEW DRUG ADDON: CPT

## 2017-08-07 PROCEDURE — 96376 TX/PRO/DX INJ SAME DRUG ADON: CPT

## 2017-08-07 RX ORDER — HYDROMORPHONE HYDROCHLORIDE 1 MG/ML
0.5 INJECTION, SOLUTION INTRAMUSCULAR; INTRAVENOUS; SUBCUTANEOUS
Status: COMPLETED | OUTPATIENT
Start: 2017-08-07 | End: 2017-08-07

## 2017-08-07 RX ORDER — DIPHENHYDRAMINE HYDROCHLORIDE 50 MG/ML
25 INJECTION INTRAMUSCULAR; INTRAVENOUS ONCE
Status: DISCONTINUED | OUTPATIENT
Start: 2017-08-07 | End: 2017-08-07

## 2017-08-07 RX ORDER — ONDANSETRON 4 MG/1
4 TABLET, ORALLY DISINTEGRATING ORAL EVERY 6 HOURS PRN
Qty: 20 TABLET | Refills: 0 | Status: SHIPPED | OUTPATIENT
Start: 2017-08-07 | End: 2017-08-14

## 2017-08-07 RX ORDER — METOCLOPRAMIDE HYDROCHLORIDE 5 MG/ML
10 INJECTION INTRAMUSCULAR; INTRAVENOUS ONCE
Status: DISCONTINUED | OUTPATIENT
Start: 2017-08-07 | End: 2017-08-07

## 2017-08-07 RX ORDER — ONDANSETRON 2 MG/ML
4 INJECTION INTRAMUSCULAR; INTRAVENOUS ONCE
Status: COMPLETED | OUTPATIENT
Start: 2017-08-07 | End: 2017-08-07

## 2017-08-07 NOTE — ED PROVIDER NOTES
Patient Seen in: [facility] Emergency Department    History   Patient presents with:  Nausea/Vomiting/Diarrhea (gastrointestinal)    Stated Complaint: N/V    HPI    70-year-old female with a history of gastric bypass surgery and bowel resection jasvir REPLACEMENT Right  9/1984: TUBAL LIGATION  04/2006 & 07/2007: UPPER GI ENDOSCOPY,EXAM    Medications :   ondansetron 4 MG Oral Tablet Dispersible,  Take 1 tablet (4 mg total) by mouth every 6 (six) hours as needed for Nausea.    Dicyclomine HCl 10 MG Oral C mouth daily. Pantoprazole Sodium (PROTONIX) 40 MG Oral Tab EC,  Take 40 mg by mouth every morning before breakfast.   clonazepam (KLONOPIN) 0.5 MG Oral Tab,  Take 0.5 mg by mouth 2 (two) times daily as needed for Anxiety.      lamoTRIgine (LAMICTAL) 200 M Psychiatric: She has a normal mood and affect.  Her behavior is normal. Thought content normal.             ED Course     Labs Reviewed   COMP METABOLIC PANEL (14) - Abnormal; Notable for the following:        Result Value    Glucose 105 (*)     Alkaline was performed.  Dose reduction techniques were used.  Dose information is   transmitted to the ACR ([facility]) Dorene Ibarra [address] ([address]) which includes the Dose Index Registry.     PATIENT STATED HISTORY:(As transcribed by osseous abnormalities are noted. No lytic, blastic or destructive osseous changes. Stable L4-5 fusion hardware.   LUNG BASES:  Mild linear scarring versus atelectasis along the lateral segment of the right middle lobe.                =======================

## 2017-08-07 NOTE — ED NOTES
Pt resting calmly on cart, family at bedside, call light within reach. Pt denies needs at this time.

## 2017-08-07 NOTE — ED PROVIDER NOTES
I reviewed that chart and discussed the case. I have examined the patient and noted moderate tenderness to the left lower quadrant, mild tenderness diffusely to the abdomen, no CVA tenderness.   Regular rate and rhythm normal S1-S2 lungs clear to auscultat

## 2017-08-07 NOTE — ED INITIAL ASSESSMENT (HPI)
Patient reports nausea/vomiting since last night, about 15 episodes. Reports began shortly after taking benadryl for hives x 2 weeks, unsure of cause of reaction. Denies diarrhea, denies fever/chills. Appears anxious in triage.  Admits to diffuse abdominal

## 2017-08-08 ENCOUNTER — HOSPITAL ENCOUNTER (OUTPATIENT)
Facility: HOSPITAL | Age: 62
Setting detail: OBSERVATION
Discharge: HOME | End: 2017-08-11
Attending: EMERGENCY MEDICINE | Admitting: INTERNAL MEDICINE
Payer: COMMERCIAL

## 2017-08-08 ENCOUNTER — APPOINTMENT (OUTPATIENT)
Dept: GENERAL RADIOLOGY | Facility: HOSPITAL | Age: 62
End: 2017-08-08
Attending: EMERGENCY MEDICINE
Payer: COMMERCIAL

## 2017-08-08 DIAGNOSIS — R11.11 VOMITING WITHOUT NAUSEA, INTRACTABILITY OF VOMITING NOT SPECIFIED, UNSPECIFIED VOMITING TYPE: ICD-10-CM

## 2017-08-08 DIAGNOSIS — R07.9 CHEST PAIN, RULE OUT ACUTE MYOCARDIAL INFARCTION: Primary | ICD-10-CM

## 2017-08-08 DIAGNOSIS — R10.84 GENERALIZED ABDOMINAL PAIN: ICD-10-CM

## 2017-08-08 DIAGNOSIS — E86.0 DEHYDRATION: ICD-10-CM

## 2017-08-08 DIAGNOSIS — R11.14 BILIOUS VOMITING WITH NAUSEA: ICD-10-CM

## 2017-08-08 LAB
ALBUMIN SERPL-MCNC: 3.3 G/DL (ref 3.5–4.8)
ALP LIVER SERPL-CCNC: 123 U/L (ref 50–130)
ALT SERPL-CCNC: 27 U/L (ref 14–54)
AST SERPL-CCNC: 31 U/L (ref 15–41)
ATRIAL RATE: 52 BPM
BASOPHILS # BLD AUTO: 0.03 X10(3) UL (ref 0–0.1)
BASOPHILS NFR BLD AUTO: 0.5 %
BILIRUB SERPL-MCNC: 0.5 MG/DL (ref 0.1–2)
BUN BLD-MCNC: 15 MG/DL (ref 8–20)
CALCIUM BLD-MCNC: 8.8 MG/DL (ref 8.3–10.3)
CHLORIDE: 110 MMOL/L (ref 101–111)
CO2: 21 MMOL/L (ref 22–32)
CREAT BLD-MCNC: 0.72 MG/DL (ref 0.55–1.02)
D-DIMER: 0.45 UG/ML FEU (ref 0–0.49)
EOSINOPHIL # BLD AUTO: 0.03 X10(3) UL (ref 0–0.3)
EOSINOPHIL NFR BLD AUTO: 0.5 %
ERYTHROCYTE [DISTWIDTH] IN BLOOD BY AUTOMATED COUNT: 14.1 % (ref 11.5–16)
GLUCOSE BLD-MCNC: 95 MG/DL (ref 70–99)
HCT VFR BLD AUTO: 39.8 % (ref 34–50)
HGB BLD-MCNC: 12.7 G/DL (ref 12–16)
IMMATURE GRANULOCYTE COUNT: 0.03 X10(3) UL (ref 0–1)
IMMATURE GRANULOCYTE RATIO %: 0.5 %
LYMPHOCYTES # BLD AUTO: 1.19 X10(3) UL (ref 0.9–4)
LYMPHOCYTES NFR BLD AUTO: 18.7 %
M PROTEIN MFR SERPL ELPH: 6.8 G/DL (ref 6.1–8.3)
MCH RBC QN AUTO: 28.2 PG (ref 27–33.2)
MCHC RBC AUTO-ENTMCNC: 31.9 G/DL (ref 31–37)
MCV RBC AUTO: 88.4 FL (ref 81–100)
MONOCYTES # BLD AUTO: 0.59 X10(3) UL (ref 0.1–0.6)
MONOCYTES NFR BLD AUTO: 9.3 %
NEUTROPHIL ABS PRELIM: 4.5 X10 (3) UL (ref 1.3–6.7)
NEUTROPHILS # BLD AUTO: 4.5 X10(3) UL (ref 1.3–6.7)
NEUTROPHILS NFR BLD AUTO: 70.5 %
P AXIS: 56 DEGREES
P-R INTERVAL: 154 MS
PLATELET # BLD AUTO: 217 10(3)UL (ref 150–450)
POTASSIUM SERPL-SCNC: 4.1 MMOL/L (ref 3.6–5.1)
Q-T INTERVAL: 444 MS
QRS DURATION: 94 MS
QTC CALCULATION (BEZET): 412 MS
R AXIS: 11 DEGREES
RBC # BLD AUTO: 4.5 X10(6)UL (ref 3.8–5.1)
RED CELL DISTRIBUTION WIDTH-SD: 45.2 FL (ref 35.1–46.3)
SODIUM SERPL-SCNC: 141 MMOL/L (ref 136–144)
T AXIS: 73 DEGREES
TROPONIN: <0.046 NG/ML (ref ?–0.05)
VENTRICULAR RATE: 52 BPM
WBC # BLD AUTO: 6.4 X10(3) UL (ref 4–13)

## 2017-08-08 PROCEDURE — 85378 FIBRIN DEGRADE SEMIQUANT: CPT | Performed by: EMERGENCY MEDICINE

## 2017-08-08 PROCEDURE — 93010 ELECTROCARDIOGRAM REPORT: CPT

## 2017-08-08 PROCEDURE — 96375 TX/PRO/DX INJ NEW DRUG ADDON: CPT

## 2017-08-08 PROCEDURE — 99285 EMERGENCY DEPT VISIT HI MDM: CPT

## 2017-08-08 PROCEDURE — 96361 HYDRATE IV INFUSION ADD-ON: CPT

## 2017-08-08 PROCEDURE — 84484 ASSAY OF TROPONIN QUANT: CPT | Performed by: EMERGENCY MEDICINE

## 2017-08-08 PROCEDURE — 85025 COMPLETE CBC W/AUTO DIFF WBC: CPT | Performed by: EMERGENCY MEDICINE

## 2017-08-08 PROCEDURE — 93005 ELECTROCARDIOGRAM TRACING: CPT

## 2017-08-08 PROCEDURE — S0028 INJECTION, FAMOTIDINE, 20 MG: HCPCS | Performed by: EMERGENCY MEDICINE

## 2017-08-08 PROCEDURE — 80053 COMPREHEN METABOLIC PANEL: CPT | Performed by: EMERGENCY MEDICINE

## 2017-08-08 PROCEDURE — 71010 XR CHEST AP PORTABLE  (CPT=71010): CPT | Performed by: EMERGENCY MEDICINE

## 2017-08-08 PROCEDURE — 96374 THER/PROPH/DIAG INJ IV PUSH: CPT

## 2017-08-08 RX ORDER — ONDANSETRON 2 MG/ML
4 INJECTION INTRAMUSCULAR; INTRAVENOUS EVERY 6 HOURS PRN
Status: DISCONTINUED | OUTPATIENT
Start: 2017-08-08 | End: 2017-08-11

## 2017-08-08 RX ORDER — QUETIAPINE 200 MG/1
200 TABLET, FILM COATED ORAL NIGHTLY
COMMUNITY
End: 2019-02-05 | Stop reason: ALTCHOICE

## 2017-08-08 RX ORDER — SODIUM CHLORIDE 9 MG/ML
INJECTION, SOLUTION INTRAVENOUS CONTINUOUS
Status: ACTIVE | OUTPATIENT
Start: 2017-08-08 | End: 2017-08-08

## 2017-08-08 RX ORDER — DIPHENHYDRAMINE HYDROCHLORIDE 50 MG/ML
25 INJECTION INTRAMUSCULAR; INTRAVENOUS EVERY 4 HOURS PRN
Status: DISCONTINUED | OUTPATIENT
Start: 2017-08-08 | End: 2017-08-11

## 2017-08-08 RX ORDER — ONDANSETRON 2 MG/ML
4 INJECTION INTRAMUSCULAR; INTRAVENOUS ONCE
Status: COMPLETED | OUTPATIENT
Start: 2017-08-08 | End: 2017-08-08

## 2017-08-08 RX ORDER — IBUPROFEN 200 MG
1 CAPSULE ORAL DAILY
COMMUNITY
End: 2019-02-05 | Stop reason: ALTCHOICE

## 2017-08-08 RX ORDER — ENALAPRILAT 2.5 MG/2ML
1.25 INJECTION INTRAVENOUS EVERY 6 HOURS PRN
Status: DISCONTINUED | OUTPATIENT
Start: 2017-08-08 | End: 2017-08-11

## 2017-08-08 RX ORDER — ERGOCALCIFEROL 1.25 MG/1
50000 CAPSULE ORAL
COMMUNITY

## 2017-08-08 RX ORDER — FAMOTIDINE 10 MG/ML
20 INJECTION, SOLUTION INTRAVENOUS ONCE
Status: COMPLETED | OUTPATIENT
Start: 2017-08-08 | End: 2017-08-08

## 2017-08-08 RX ORDER — ONDANSETRON 2 MG/ML
4 INJECTION INTRAMUSCULAR; INTRAVENOUS EVERY 4 HOURS PRN
Status: DISCONTINUED | OUTPATIENT
Start: 2017-08-08 | End: 2017-08-09

## 2017-08-08 RX ORDER — KETOROLAC TROMETHAMINE 30 MG/ML
15 INJECTION, SOLUTION INTRAMUSCULAR; INTRAVENOUS EVERY 6 HOURS PRN
Status: DISPENSED | OUTPATIENT
Start: 2017-08-08 | End: 2017-08-10

## 2017-08-09 ENCOUNTER — APPOINTMENT (OUTPATIENT)
Dept: CV DIAGNOSTICS | Facility: HOSPITAL | Age: 62
End: 2017-08-09
Attending: NURSE PRACTITIONER
Payer: COMMERCIAL

## 2017-08-09 LAB — TROPONIN: <0.046 NG/ML (ref ?–0.05)

## 2017-08-09 PROCEDURE — 96372 THER/PROPH/DIAG INJ SC/IM: CPT

## 2017-08-09 PROCEDURE — 96376 TX/PRO/DX INJ SAME DRUG ADON: CPT

## 2017-08-09 PROCEDURE — 94640 AIRWAY INHALATION TREATMENT: CPT

## 2017-08-09 PROCEDURE — C9113 INJ PANTOPRAZOLE SODIUM, VIA: HCPCS | Performed by: NURSE PRACTITIONER

## 2017-08-09 PROCEDURE — 87081 CULTURE SCREEN ONLY: CPT | Performed by: INTERNAL MEDICINE

## 2017-08-09 PROCEDURE — 84484 ASSAY OF TROPONIN QUANT: CPT | Performed by: EMERGENCY MEDICINE

## 2017-08-09 PROCEDURE — 96375 TX/PRO/DX INJ NEW DRUG ADDON: CPT

## 2017-08-09 PROCEDURE — 94664 DEMO&/EVAL PT USE INHALER: CPT

## 2017-08-09 RX ORDER — ONDANSETRON 4 MG/1
4 TABLET, ORALLY DISINTEGRATING ORAL EVERY 6 HOURS PRN
Status: DISCONTINUED | OUTPATIENT
Start: 2017-08-09 | End: 2017-08-11

## 2017-08-09 RX ORDER — ERGOCALCIFEROL 1.25 MG/1
50000 CAPSULE ORAL WEEKLY
Status: DISCONTINUED | OUTPATIENT
Start: 2017-08-09 | End: 2017-08-11

## 2017-08-09 RX ORDER — ATORVASTATIN CALCIUM 20 MG/1
20 TABLET, FILM COATED ORAL NIGHTLY
Status: DISCONTINUED | OUTPATIENT
Start: 2017-08-09 | End: 2017-08-11

## 2017-08-09 RX ORDER — MULTIPLE VITAMINS W/ MINERALS TAB 9MG-400MCG
1 TAB ORAL DAILY
Status: DISCONTINUED | OUTPATIENT
Start: 2017-08-09 | End: 2017-08-11

## 2017-08-09 RX ORDER — TORSEMIDE 20 MG/1
20 TABLET ORAL 2 TIMES DAILY
Status: DISCONTINUED | OUTPATIENT
Start: 2017-08-09 | End: 2017-08-11

## 2017-08-09 RX ORDER — PANTOPRAZOLE SODIUM 40 MG/1
40 TABLET, DELAYED RELEASE ORAL
Status: DISCONTINUED | OUTPATIENT
Start: 2017-08-09 | End: 2017-08-09

## 2017-08-09 RX ORDER — MELATONIN
325
Status: DISCONTINUED | OUTPATIENT
Start: 2017-08-09 | End: 2017-08-11

## 2017-08-09 RX ORDER — LAMOTRIGINE 100 MG/1
400 TABLET ORAL NIGHTLY
Status: DISCONTINUED | OUTPATIENT
Start: 2017-08-09 | End: 2017-08-11

## 2017-08-09 RX ORDER — LOSARTAN POTASSIUM 25 MG/1
25 TABLET ORAL DAILY
Status: DISCONTINUED | OUTPATIENT
Start: 2017-08-09 | End: 2017-08-11

## 2017-08-09 RX ORDER — CITALOPRAM 40 MG/1
40 TABLET ORAL DAILY
Status: DISCONTINUED | OUTPATIENT
Start: 2017-08-09 | End: 2017-08-09 | Stop reason: SDUPTHER

## 2017-08-09 RX ORDER — ENOXAPARIN SODIUM 100 MG/ML
40 INJECTION SUBCUTANEOUS DAILY
Status: DISCONTINUED | OUTPATIENT
Start: 2017-08-09 | End: 2017-08-11

## 2017-08-09 RX ORDER — CITALOPRAM 40 MG/1
80 TABLET ORAL DAILY
Status: DISCONTINUED | OUTPATIENT
Start: 2017-08-09 | End: 2017-08-09

## 2017-08-09 RX ORDER — SPIRONOLACTONE 25 MG/1
25 TABLET ORAL 2 TIMES DAILY
Status: DISCONTINUED | OUTPATIENT
Start: 2017-08-09 | End: 2017-08-11

## 2017-08-09 RX ORDER — CITALOPRAM 40 MG/1
40 TABLET ORAL DAILY
Status: DISCONTINUED | OUTPATIENT
Start: 2017-08-10 | End: 2017-08-09

## 2017-08-09 RX ORDER — GABAPENTIN 300 MG/1
300 CAPSULE ORAL 4 TIMES DAILY
Status: DISCONTINUED | OUTPATIENT
Start: 2017-08-09 | End: 2017-08-11

## 2017-08-09 RX ORDER — LEVOTHYROXINE SODIUM 0.05 MG/1
50 TABLET ORAL
Status: DISCONTINUED | OUTPATIENT
Start: 2017-08-09 | End: 2017-08-11

## 2017-08-09 RX ORDER — OXYCODONE AND ACETAMINOPHEN 10; 325 MG/1; MG/1
1 TABLET ORAL EVERY 6 HOURS PRN
Status: DISCONTINUED | OUTPATIENT
Start: 2017-08-09 | End: 2017-08-11

## 2017-08-09 RX ORDER — CLONAZEPAM 0.5 MG/1
0.5 TABLET ORAL 2 TIMES DAILY PRN
Status: DISCONTINUED | OUTPATIENT
Start: 2017-08-09 | End: 2017-08-11

## 2017-08-09 RX ORDER — POTASSIUM CHLORIDE 20 MEQ/1
40 TABLET, EXTENDED RELEASE ORAL 4 TIMES DAILY
Status: DISCONTINUED | OUTPATIENT
Start: 2017-08-09 | End: 2017-08-11

## 2017-08-09 RX ORDER — CITALOPRAM 40 MG/1
40 TABLET ORAL DAILY
Qty: 5 TABLET | Refills: 0 | Status: SHIPPED | OUTPATIENT
Start: 2017-08-09 | End: 2019-02-05 | Stop reason: ALTCHOICE

## 2017-08-09 RX ORDER — ESCITALOPRAM OXALATE 20 MG/1
20 TABLET ORAL DAILY
Status: DISCONTINUED | OUTPATIENT
Start: 2017-08-09 | End: 2017-08-11

## 2017-08-09 RX ORDER — CYANOCOBALAMIN 1000 UG/ML
1000 INJECTION INTRAMUSCULAR; SUBCUTANEOUS
Status: DISCONTINUED | OUTPATIENT
Start: 2017-08-27 | End: 2017-08-11

## 2017-08-09 RX ORDER — OXYBUTYNIN CHLORIDE 5 MG/1
5 TABLET ORAL DAILY
Status: DISCONTINUED | OUTPATIENT
Start: 2017-08-09 | End: 2017-08-11

## 2017-08-09 NOTE — PLAN OF CARE
Pt c/o nausea and abdomenal pain. Pt states the pain is a \"dull, constant, poke \". Abdomen soft, bs +4. Pt states toradol did not help the pain at all. Zofran given. Page out to Dr. Sabrina Hernandez for pain medication orders. Awaiting call back.     0145  Pt

## 2017-08-09 NOTE — HISTORICAL OFFICE NOTE
SHIRA LEE  : 1955  MRNO:  [id number]  [id number]  PCP: Dr. Rubio Manzano     TODAY'S DATE: 2007     CHIEF COMPLAINT: Followup of Aortic insufficiency, Followup of Family history of cardiovascular disease, Followup of Hypertension and 600 Mg, as dirtected 600mgam 900mg pm; Ultram 50 Mg, 1 PO Q 4-6 HRS PRN; Xanax 0.5 Mg, as directed    VITAL SIGNS: B/P - 142/82, Pulse - 68, Respiration - 16, Weight - 250.00  CONS: well developed, well nourished, obese and pale.  HEAD/FACE: no trauma and n care.

## 2017-08-09 NOTE — H&P
[facility]    PATIENT'S NAME: Jayarabellapari Yap   ATTENDING PHYSICIAN: Any Lee M.D.    PATIENT ACCOUNT#:   [de-identified]    LOCATION:  [address] [place] [address]  MEDICAL RECORD #:   [id number]       YOB: 1955  ADMISSION DATE:       08/08/2017 acute fractures. SKIN:  No apparent acute ulcer. LABORATORY DATA:  Troponin negative. CBC essentially unremarkable. CMP essentially unremarkable.     IMPRESSION:  An elderly female, currently with:      1.   Epigastric pain with intractable nausea,

## 2017-08-09 NOTE — ED PROVIDER NOTES
Patient Seen in: [facility] 8ne-a    History   Patient presents with:  Chest Pain Angina (cardiovascular)    Stated Complaint: Cp    HPI    Patient is a 19-year-old female with a history of anxiety, back pain who presents for evaluation.   Patient wa her teeth  08/2003 & 8/2013: OTHER SURGICAL HISTORY      Comment: open abdominal wound repair  No date: REMOVAL GALLBLADDER  10/2006 & 4/2010: TOTAL KNEE REPLACEMENT Left  01/2009: TOTAL KNEE REPLACEMENT Right  9/1984: TUBAL LIGATION  04/2006 & 07/2007: Kermit Her (COZAAR) 25 MG Oral Tab,  Take 25 mg by mouth daily. Oxybutynin Chloride (DITROPAN) 5 MG Oral Tab,  Take 5 mg by mouth daily.    Pantoprazole Sodium (PROTONIX) 40 MG Oral Tab EC,  Take 40 mg by mouth every morning before breakfast.   clonazepam (KLONOPIN) clubbing/cyanosis/edema. Skin: No rashes, no pallor  Neuro: Awake oriented ×3.   Nonfocal.  Good strength throughout    ED Course     Labs Reviewed   COMP METABOLIC PANEL (14) - Abnormal; Notable for the following:        Result Value    Albumin 3.3 (*) type  Dehydration    Disposition:  Admit    Follow-up:  No follow-up provider specified.     Medications Prescribed:  Current Discharge Medication List        Present on Admission  Date Reviewed: 12/1/2015          ICD-10-CM Noted POA    * (Principal)Chest

## 2017-08-09 NOTE — CONSULTS
[facility]                       Gastroenterology [address] [facility] Gastroenterology    Hyun Zepeda Patient Status:  Observation    1955 MRN [id number]   [facility] 8NE-A Attending Qi Solares MD   Hosp Day # 0 PCP Kendall Sprague RELEASE      Comment: bilat CTR  10/1980: CHOLECYSTECTOMY      Comment: open  06/2004 & 11/2004: COLECTOMY  3/2002: Jen Range      Comment: kidney stone removed  6/1997, 5/2002: DILATION/CURETTAGE,DIAGNOSTIC  02/2003: FOOT SURGERY Before breakfast   torsemide (DEMADEX) tab 20 mg 20 mg Oral BID   multivitamin with minerals (ADULT) tab 1 tablet 1 tablet Oral Daily   ferrous sulfate EC tab 325 mg 325 mg Oral Daily with breakfast   ondansetron (ZOFRAN-ODT) disintegrating tab 4 mg 4 mg O Hematologic: The patient reports no easy bruising, frequent gum bleeding or nose bleeding;   The patient has no history of known chronic anemia            Dermatologic: The patient reports no recent rashes or chronic skin disorders            Rheumatologic: K 4.1 08/08/2017    08/08/2017   CO2 21.0 08/08/2017   GLU 95 08/08/2017   CA 8.8 08/08/2017   ALB 3.3 08/08/2017   ALKPHO 123 08/08/2017   BILT 0.5 08/08/2017   AST 31 08/08/2017   ALT 27 08/08/2017     Recent Labs   Lab  08/07/17   1220  08/08/17 age.  PANCREAS:  Stable mild diffuse fatty infiltration of the pancreas. SPLEEN:  Borderline splenomegaly measuring 12.3 cm which retrospectively appears stable. KIDNEYS:  Normal.  No mass, obstruction, or calcification.     ADRENALS:  Normal.  No mass o colonoscopy in AM to further assess for potential sources of pain however functional pain and IBS remain in the differential      Recommendations:     1. Plan for EGD and colonoscopy in AM under MAC with Dr Bernard Guardado  2.  Clear liquid diet today; NPO after m

## 2017-08-09 NOTE — PLAN OF CARE
GASTROINTESTINAL - ADULT    • Minimal or absence of nausea and vomiting Progressing        PAIN - ADULT    • Verbalizes/displays adequate comfort level or patient's stated pain goal Progressing          Pt received in bed axox4.  C/O nausea and abdominal pa

## 2017-08-09 NOTE — PAYOR COMM NOTE
--------------  ADMISSION REVIEW     Payor: [address] #:  [id number]  Authorization Number: N/A    Admit date: N/A  Admit time: N/A       Admitting Physician: Akira Yuen MD  Attending Physician:  Akira Yuen, [facility] Ht 160 cm (5' 3\")   Wt 110 kg   SpO2 96%   BMI 42.96 kg/m²        ED Course     Labs Reviewed   COMP METABOLIC PANEL (14) - Abnormal; Notable for the following:        Result Value    Albumin 3.3 (*)     CO2 21.0 (*)     All other components within normal chloride 0.9% IV bolus 1,000 mL     Date Action Dose Route User    8/8/2017 1711 New Bag 1000 mL Intravenous Ashley Vasquez RN      sodium chloride 0.9% IV bolus 1,000 mL     Date Action Dose Route User    8/8/2017 1822 New Bag 1000 mL Intravenous Papes

## 2017-08-09 NOTE — PLAN OF CARE
Pt AOx4. On Ra, denies SOB. SB on tele. C/o nausea and sharp, intermittent abdominal pain. Pt denies emesis since admission. Dr. Lisa Garcia (covering for Dr. Manasa Pizano) paged for admission orders. IVF infusing per order. PRN toradol given for abdominal pain.

## 2017-08-09 NOTE — CONSULTS
[facility]  Report of Consultation    Vickye Dubin Patient Status:  Observation    1955 MRN [id number]   [facility] 8NE-A Attending Pamela Crump MD   Hosp Day # 0 PCP Kinjal Landa MD     Reason for Consultation:  Chest pain, Surgical History:  : ARTHROSCOPY OF JOINT UNLISTED Right  2003: ARTHROSCOPY OF JOINT UNLISTED Left  1980,1983,1984,:   , , -: CARPAL TUNNEL RELEASE      Comment: yareli [facility]  10/1980: CHOLECYSTECTOMY      Comment: jess 40 mg, Subcutaneous, Daily  •  Losartan Potassium (COZAAR) tab 25 mg, 25 mg, Oral, Daily  •  Oxybutynin Chloride (DITROPAN) tab 5 mg, 5 mg, Oral, Daily  •  ClonazePAM (KLONOPIN) tab 0.5 mg, 0.5 mg, Oral, BID PRN  •  lamoTRIgine (LAMICTAL) tab 400 mg, 400 m %.  Temp (24hrs), Av.1 °F (36.7 °C), Min:98 °F (36.7 °C), Max:98.2 °F (36.8 °C)    Wt Readings from Last 3 Encounters:  17 : 242 lb 8.1 oz (110 kg)  17 : 242 lb (109.8 kg)  17 : 250 lb 10.6 oz (113.7 kg)      General: Alert and orient intrahepatic ducts. CBD measures 1.1 cm in greatest diameter which is stable and likely relating to patient's postoperative change and age. PANCREAS:  Stable mild diffuse fatty infiltration of the pancreas.  SPLEEN:  Borderline splenomegaly measuring 12.3 c transcribed by Technologist)   Chest pain. Patient with constant sharp chest pain onset yesterday, slight CJ, cough,  nausea and vomited for the past 2 days. FINDINGS:  Cardiomegaly with normal pulmonary vascularity.  No pleural effusion or pneumotho Zofran PRN  - follow cardiac enzymes and ECG series  - agree with GI scope and no need for further cardiac w/u  - increase activity post discharge, wt loss  - no new cardiac meds  - continue statin and losartan from home  - boost PPI  - outpatient PAULA stud

## 2017-08-10 ENCOUNTER — SURGERY (OUTPATIENT)
Age: 62
End: 2017-08-10

## 2017-08-10 PROCEDURE — 0DBE8ZX EXCISION OF LARGE INTESTINE, VIA NATURAL OR ARTIFICIAL OPENING ENDOSCOPIC, DIAGNOSTIC: ICD-10-PCS | Performed by: INTERNAL MEDICINE

## 2017-08-10 PROCEDURE — 87081 CULTURE SCREEN ONLY: CPT | Performed by: INTERNAL MEDICINE

## 2017-08-10 PROCEDURE — 0DB58ZX EXCISION OF ESOPHAGUS, VIA NATURAL OR ARTIFICIAL OPENING ENDOSCOPIC, DIAGNOSTIC: ICD-10-PCS | Performed by: INTERNAL MEDICINE

## 2017-08-10 PROCEDURE — 88305 TISSUE EXAM BY PATHOLOGIST: CPT | Performed by: INTERNAL MEDICINE

## 2017-08-10 PROCEDURE — 0DB68ZX EXCISION OF STOMACH, VIA NATURAL OR ARTIFICIAL OPENING ENDOSCOPIC, DIAGNOSTIC: ICD-10-PCS | Performed by: INTERNAL MEDICINE

## 2017-08-10 PROCEDURE — 0DB88ZX EXCISION OF SMALL INTESTINE, VIA NATURAL OR ARTIFICIAL OPENING ENDOSCOPIC, DIAGNOSTIC: ICD-10-PCS | Performed by: INTERNAL MEDICINE

## 2017-08-10 PROCEDURE — 0DBN8ZX EXCISION OF SIGMOID COLON, VIA NATURAL OR ARTIFICIAL OPENING ENDOSCOPIC, DIAGNOSTIC: ICD-10-PCS | Performed by: INTERNAL MEDICINE

## 2017-08-10 PROCEDURE — C9113 INJ PANTOPRAZOLE SODIUM, VIA: HCPCS | Performed by: NURSE PRACTITIONER

## 2017-08-10 PROCEDURE — 36415 COLL VENOUS BLD VENIPUNCTURE: CPT

## 2017-08-10 PROCEDURE — 96376 TX/PRO/DX INJ SAME DRUG ADON: CPT

## 2017-08-10 RX ORDER — QUETIAPINE 100 MG/1
200 TABLET, FILM COATED ORAL NIGHTLY
Status: DISCONTINUED | OUTPATIENT
Start: 2017-08-10 | End: 2017-08-11

## 2017-08-10 RX ORDER — DIPHENHYDRAMINE HCL 25 MG
25 CAPSULE ORAL EVERY 6 HOURS PRN
Status: DISCONTINUED | OUTPATIENT
Start: 2017-08-10 | End: 2017-08-11

## 2017-08-10 RX ORDER — NALOXONE HYDROCHLORIDE 0.4 MG/ML
80 INJECTION, SOLUTION INTRAMUSCULAR; INTRAVENOUS; SUBCUTANEOUS AS NEEDED
Status: ACTIVE | OUTPATIENT
Start: 2017-08-10 | End: 2017-08-10

## 2017-08-10 RX ORDER — SODIUM CHLORIDE, SODIUM LACTATE, POTASSIUM CHLORIDE, CALCIUM CHLORIDE 600; 310; 30; 20 MG/100ML; MG/100ML; MG/100ML; MG/100ML
INJECTION, SOLUTION INTRAVENOUS CONTINUOUS
Status: DISCONTINUED | OUTPATIENT
Start: 2017-08-10 | End: 2017-08-11

## 2017-08-10 NOTE — OPERATIVE REPORT
Mykel Hope Patient Status:  Observation    1955 MRN [id number]   [facility] ENDOSCOPY Attending Kyree Smith MD   Hosp Day # 0 PCP Benjy Serrato MD     PREOPERATIVE DIAGNOSIS/INDICATION: Anemia, abdominal pain  POSTOPERTATIVE point  - Repeat colonoscopy in 5 years.   Nadine Diamond  8/10/2017  2:05 PM

## 2017-08-10 NOTE — PROGRESS NOTES
[facility]  Progress Note    Climmie Bowling [place] Patient Status:  Observation    1955 MRN [id number]   [facility] 8NE-A Attending Jaqueline Reyes MD   Hosp Day # 0 PCP Yolanda Kurtz MD         SUBJECTIVE:  Subjective:  Climmie [place] is hours.            Meds:     • QUEtiapine Fumarate  200 mg Oral Nightly   • pantoprazole (PROTONIX) IV push  40 mg Intravenous Q24H   • enoxaparin  40 mg Subcutaneous Daily   • Losartan Potassium  25 mg Oral Daily   • Oxybutynin Chloride  5 mg Oral Daily pain.  8.                 History of depression, bipolar disorder.   She has been on several high-dose antipsychotics, including but not limited to, Ritalin, Seroquel, Celexa, clonazepam, Lamictal.  All from her psychiatrist- dose reduction done due to s/e-

## 2017-08-10 NOTE — PLAN OF CARE
GASTROINTESTINAL - ADULT    • Minimal or absence of nausea and vomiting Progressing    • Maintains or returns to baseline bowel function Progressing    • Maintains adequate nutritional intake (undernourished) Progressing    • Achieves appropriate nutrition Initial cardiac enzymes (-), echo (-) in 03/2017 and nuclear stress test (-) in 03/2017. ECG - no ischemia. CXR - no congestion. D-dimer (-). All labs normal. CT - No acute process within the abdomen or pelvis. 2. HTN - controlled.   3. Bradycardia, mild,

## 2017-08-10 NOTE — OPERATIVE REPORT
Jojo Chao Patient Status:  Observation    1955 MRN [id number]   [facility] ENDOSCOPY Attending Azael Oconnell MD   Hosp Day # 0 PCP Annelise Early MD     PREOPERATIVE DIAGNOSIS/INDICATION: Nausea, anemia  POSTOPERTATIVE DIAGNOSI

## 2017-08-11 VITALS
HEIGHT: 63 IN | RESPIRATION RATE: 18 BRPM | HEART RATE: 56 BPM | SYSTOLIC BLOOD PRESSURE: 117 MMHG | TEMPERATURE: 98 F | WEIGHT: 239.19 LBS | BODY MASS INDEX: 42.38 KG/M2 | OXYGEN SATURATION: 96 % | DIASTOLIC BLOOD PRESSURE: 83 MMHG

## 2017-08-11 PROCEDURE — 96376 TX/PRO/DX INJ SAME DRUG ADON: CPT

## 2017-08-11 PROCEDURE — 87081 CULTURE SCREEN ONLY: CPT | Performed by: INTERNAL MEDICINE

## 2017-08-11 PROCEDURE — C9113 INJ PANTOPRAZOLE SODIUM, VIA: HCPCS | Performed by: NURSE PRACTITIONER

## 2017-08-11 NOTE — DISCHARGE SUMMARY
[facility]  Discharge Summary    Tyra Talavera Patient Status:  Observation    1955 MRN [id number]   [facility] 8NE-A Attending Barbra Kaufman MD   Hosp Day # 0 PCP Marcela Gates MD     Date of Admission: 2017    Date of Dis Tab  Take 1 tablet by mouth daily. Fluticasone Furoate-Vilanterol (BREO ELLIPTA IN)  Inhale 1 puff into the lungs daily. ondansetron 4 MG Oral Tablet Dispersible  Take 1 tablet (4 mg total) by mouth every 6 (six) hours as needed for Nausea.   Qty: 20

## 2017-08-11 NOTE — PLAN OF CARE
Isolation Precautions Maintained  High Risk Bleeding Precautions Maintained  Bleeding Risk Precautions Maintained    Comments: Pt is A&OX4, VSS on RA and maintaining NSR on telemetry. Tolerating diet today.   Advanced from clear liquid, to full liquid, to over-consumption  - Identify factors contributing to increased intake, treat as appropriate  - Monitor I&O, WT and lab values  - Obtain nutritional consult as needed  - Evaluate psychosocial factors contributing to over-consumption   Outcome: Adequate for

## 2017-08-11 NOTE — PROGRESS NOTES
Tele & iv site removed. Discharge paperwork, discharge med rec & medication side effects, follow-up appointments needed, and low residue diet all reviewed in depth w/pt. All questions answered.   Transported via W/C by transporter to Rodrigo becker

## 2017-10-26 ENCOUNTER — HOSPITAL ENCOUNTER (OUTPATIENT)
Dept: MAMMOGRAPHY | Age: 62
Discharge: HOME | End: 2017-10-26
Attending: INTERNAL MEDICINE
Payer: COMMERCIAL

## 2017-10-26 DIAGNOSIS — Z12.31 ENCOUNTER FOR SCREENING MAMMOGRAM FOR MALIGNANT NEOPLASM OF BREAST: ICD-10-CM

## 2017-10-26 PROCEDURE — 77067 SCR MAMMO BI INCL CAD: CPT | Performed by: INTERNAL MEDICINE

## 2017-12-08 ENCOUNTER — HOSPITAL (OUTPATIENT)
Dept: OTHER | Age: 62
End: 2017-12-08
Attending: SPECIALIST

## 2017-12-08 LAB
ALBUMIN SERPL-MCNC: 3.4 GM/DL (ref 3.6–5.1)
ALBUMIN/GLOB SERPL: 1 {RATIO} (ref 1–2.4)
ALP SERPL-CCNC: 95 UNIT/L (ref 45–117)
ALT SERPL-CCNC: 35 UNIT/L
ANION GAP SERPL CALC-SCNC: 13 MMOL/L (ref 10–20)
AST SERPL-CCNC: 28 UNIT/L
BILIRUB SERPL-MCNC: 0.4 MG/DL (ref 0.2–1)
BUN SERPL-MCNC: 30 MG/DL (ref 6–20)
BUN/CREAT SERPL: 34 (ref 7–25)
CALCIUM SERPL-MCNC: 8.2 MG/DL (ref 8.4–10.2)
CHLORIDE: 102 MMOL/L (ref 98–107)
CO2 SERPL-SCNC: 30 MMOL/L (ref 21–32)
CREAT SERPL-MCNC: 0.89 MG/DL (ref 0.51–0.95)
GLOBULIN SER-MCNC: 3.4 GM/DL (ref 2–4)
GLUCOSE SERPL-MCNC: 100 MG/DL (ref 65–99)
LENGTH OF FAST TIME PATIENT: ABNORMAL HR
POTASSIUM SERPL-SCNC: 4 MMOL/L (ref 3.4–5.1)
PROT SERPL-MCNC: 6.8 GM/DL (ref 6.4–8.2)
SODIUM SERPL-SCNC: 141 MMOL/L (ref 135–145)

## 2018-01-05 ENCOUNTER — TELEPHONE (OUTPATIENT)
Dept: SURGERY | Facility: CLINIC | Age: 63
End: 2018-01-05

## 2018-01-09 ENCOUNTER — OFFICE VISIT (OUTPATIENT)
Dept: SURGERY | Facility: CLINIC | Age: 63
End: 2018-01-09

## 2018-01-09 VITALS
BODY MASS INDEX: 42.35 KG/M2 | SYSTOLIC BLOOD PRESSURE: 130 MMHG | DIASTOLIC BLOOD PRESSURE: 74 MMHG | WEIGHT: 239 LBS | HEIGHT: 63 IN | HEART RATE: 78 BPM

## 2018-01-09 DIAGNOSIS — Z98.890 HISTORY OF LUMBAR SURGERY: Primary | ICD-10-CM

## 2018-01-09 DIAGNOSIS — M47.16 OSTEOARTHRITIS OF LUMBAR SPINE WITH MYELOPATHY: ICD-10-CM

## 2018-01-09 DIAGNOSIS — M46.1 SACROILIITIS, NOT ELSEWHERE CLASSIFIED (HCC): ICD-10-CM

## 2018-01-09 DIAGNOSIS — M48.061 SPINAL STENOSIS OF LUMBAR REGION, UNSPECIFIED WHETHER NEUROGENIC CLAUDICATION PRESENT: ICD-10-CM

## 2018-01-09 DIAGNOSIS — M47.816 LUMBAR FACET ARTHROPATHY: ICD-10-CM

## 2018-01-09 DIAGNOSIS — Z96.653 HISTORY OF BILATERAL KNEE REPLACEMENT: ICD-10-CM

## 2018-01-09 PROCEDURE — 99204 OFFICE O/P NEW MOD 45 MIN: CPT | Performed by: PHYSICIAN ASSISTANT

## 2018-01-09 NOTE — PATIENT INSTRUCTIONS
Refill policies:    • Allow 2-3 business days for refills; controlled substances may take longer.   • Contact your pharmacy at least 5 days prior to running out of medication and have them send an electronic request or submit request through the [facility] have a procedure or additional testing performed. [facility]) will contact your insurance carrier to obtain pre-certification or prior authorization.     Unfortunately, [employer] has seen an increase in denial of payment even though the p

## 2018-01-09 NOTE — PROGRESS NOTES
HPI:    Patient ID: Adriana Pantoja is a 58year old female. HPI    Review of Systems         Current Outpatient Prescriptions:  Citalopram Hydrobromide 40 MG Oral Tab Take 1 tablet (40 mg total) by mouth daily.  Disp: 5 tablet Rfl: 0   ergocalciferol 5000 clonazepam (KLONOPIN) 0.5 MG Oral Tab Take 0.5 mg by mouth 2 (two) times daily as needed for Anxiety. Disp:  Rfl:    lamoTRIgine (LAMICTAL) 200 MG Oral Tab Take 400 mg by mouth nightly.    Disp:  Rfl:      Allergies:  Morphine Sulfate            Comment:O

## 2018-01-09 NOTE — H&P
Name: Deena Thompson   : 1955   DOS: 2018     Chief complaint: pain    History of present illness:  Deena Thompson is a 58year old female complaining of       Past Medical History:   Diagnosis Date   • Anxiety    • Back problem    • Depression times daily. Disp:  Rfl:    Losartan Potassium (COZAAR) 25 MG Oral Tab Take 25 mg by mouth daily. Disp:  Rfl:    Oxybutynin Chloride (DITROPAN) 5 MG Oral Tab Take 5 mg by mouth daily.  Disp:  Rfl:    Pantoprazole Sodium (PROTONIX) 40 MG Oral Tab EC Take 4 • Heart Disorder Mother    • Other Shanice Garcia Mother    • Diabetes Sister    • Cancer Sister      Smoking status: Former Smoker                                                              Packs/day: 0.00      Years: 0.00         Quit date: 5/5/1984  [employer]

## 2018-01-09 NOTE — H&P
Name: Lalit Mendez   : 1955   DOS: 2018     Chief complaint: Patient presents with:  New Patient: back pain      History of present illness:  Lalit Mendez is a 58year old female complaining of  Chronic low back pain.   Patient had T LIF at L4 Current Outpatient Prescriptions:  Citalopram Hydrobromide 40 MG Oral Tab Take 1 tablet (40 mg total) by mouth daily. Disp: 5 tablet Rfl: 0   ergocalciferol 13819 units Oral Cap Take 50,000 Units by mouth once a week.  wednesdays Disp:  Rfl:    QUEtiapine F Morphine Sulfate            Comment:Other reaction(s): Migraine  Adhesive Tape               Comment:Medical tape  Adhesive Tape (Kristin*        Comment:Other reaction(s): Redness  Past Surgical History:  05/99: ARTHROSCOPY OF JOINT UNLISTED Right  05/2003: Review of  other systems:  Constitutional: negative for fever, weight loss and malaise/fatigue  Cardiovascular:  Denies shortness of breath, chest pain, dyspnea on exertion, ischemia in extremities. Endocrine: Negative.  Specifically denies thyroid disorde Hip Flexion:    5    4   Knee Extension:   5    5   Knee Flexion:    5    5   Ant.  Tibialis:    5    5  Extensor Hallucis Longus:   5    5  Peroneals:     5    5  Gastrocsoleus:     5    5      Radiology diagnostic studies:      X-RAYS LUMBAR SPINE     X-

## 2018-01-18 ENCOUNTER — TELEPHONE (OUTPATIENT)
Dept: SURGERY | Facility: CLINIC | Age: 63
End: 2018-01-18

## 2018-01-18 DIAGNOSIS — M48.061 SPINAL STENOSIS OF LUMBAR REGION, UNSPECIFIED WHETHER NEUROGENIC CLAUDICATION PRESENT: ICD-10-CM

## 2018-01-18 DIAGNOSIS — Z98.890 HISTORY OF LUMBAR SURGERY: Primary | ICD-10-CM

## 2018-01-18 NOTE — TELEPHONE ENCOUNTER
Per Mason Jones, pt has lumbar spine surgery if MRI LS ordered with and without then mri will be covered. I checked pt's most recent CMP and her GFR looks good at 93. Will order with and without.

## 2018-02-01 ENCOUNTER — TELEPHONE (OUTPATIENT)
Dept: SURGERY | Facility: CLINIC | Age: 63
End: 2018-02-01

## 2018-02-01 DIAGNOSIS — M96.1 FAILED BACK SURGICAL SYNDROME: Primary | ICD-10-CM

## 2018-02-01 DIAGNOSIS — M54.16 LUMBAR RADICULITIS: ICD-10-CM

## 2018-02-01 NOTE — TELEPHONE ENCOUNTER
Spoke to Jinny from central scheduling regarding pt's MRI orders. Two lumbar MRI orders were place. The one from 1/9/18 is no longer authorized but it is with sedation.  The order from 1/18 is authorized but its with or without contrast. RN informed Jinny

## 2018-02-19 RX ORDER — SODIUM CHLORIDE, SODIUM LACTATE, POTASSIUM CHLORIDE, CALCIUM CHLORIDE 600; 310; 30; 20 MG/100ML; MG/100ML; MG/100ML; MG/100ML
INJECTION, SOLUTION INTRAVENOUS CONTINUOUS
Status: CANCELLED | OUTPATIENT
Start: 2018-02-19

## 2018-02-19 RX ORDER — ESZOPICLONE 1 MG/1
1 TABLET, FILM COATED ORAL NIGHTLY
COMMUNITY
End: 2019-02-05 | Stop reason: ALTCHOICE

## 2018-02-20 ENCOUNTER — APPOINTMENT (OUTPATIENT)
Dept: LAB | Age: 63
End: 2018-02-20
Payer: COMMERCIAL

## 2018-02-20 DIAGNOSIS — M54.16 LUMBAR RADICULITIS: ICD-10-CM

## 2018-02-20 LAB
ATRIAL RATE: 61 BPM
CHLORIDE: 102 MMOL/L (ref 101–111)
CO2: 31 MMOL/L (ref 22–32)
P AXIS: 42 DEGREES
P-R INTERVAL: 150 MS
POTASSIUM SERPL-SCNC: 4.4 MMOL/L (ref 3.6–5.1)
Q-T INTERVAL: 404 MS
QRS DURATION: 82 MS
QTC CALCULATION (BEZET): 406 MS
R AXIS: 29 DEGREES
SODIUM SERPL-SCNC: 136 MMOL/L (ref 136–144)
T AXIS: 55 DEGREES
VENTRICULAR RATE: 61 BPM

## 2018-02-20 PROCEDURE — 80051 ELECTROLYTE PANEL: CPT

## 2018-02-20 PROCEDURE — 93010 ELECTROCARDIOGRAM REPORT: CPT | Performed by: INTERNAL MEDICINE

## 2018-02-20 PROCEDURE — 93005 ELECTROCARDIOGRAM TRACING: CPT

## 2018-02-20 PROCEDURE — 36415 COLL VENOUS BLD VENIPUNCTURE: CPT

## 2018-03-02 ENCOUNTER — ANESTHESIA EVENT (OUTPATIENT)
Dept: MRI IMAGING | Facility: HOSPITAL | Age: 63
End: 2018-03-02
Payer: COMMERCIAL

## 2018-03-02 ENCOUNTER — APPOINTMENT (OUTPATIENT)
Dept: LAB | Facility: HOSPITAL | Age: 63
End: 2018-03-02
Attending: PHYSICIAN ASSISTANT
Payer: COMMERCIAL

## 2018-03-02 ENCOUNTER — HOSPITAL ENCOUNTER (OUTPATIENT)
Dept: MRI IMAGING | Facility: HOSPITAL | Age: 63
Discharge: HOME | End: 2018-03-02
Attending: PHYSICIAN ASSISTANT
Payer: COMMERCIAL

## 2018-03-02 ENCOUNTER — ANESTHESIA (OUTPATIENT)
Dept: MRI IMAGING | Facility: HOSPITAL | Age: 63
End: 2018-03-02
Payer: COMMERCIAL

## 2018-03-02 VITALS
WEIGHT: 231 LBS | HEIGHT: 64 IN | SYSTOLIC BLOOD PRESSURE: 140 MMHG | HEART RATE: 72 BPM | DIASTOLIC BLOOD PRESSURE: 60 MMHG | BODY MASS INDEX: 39.44 KG/M2 | TEMPERATURE: 99 F | OXYGEN SATURATION: 95 % | RESPIRATION RATE: 16 BRPM

## 2018-03-02 DIAGNOSIS — M54.16 LUMBAR RADICULITIS: Primary | ICD-10-CM

## 2018-03-02 DIAGNOSIS — M96.1 FAILED BACK SURGICAL SYNDROME: ICD-10-CM

## 2018-03-02 PROCEDURE — A9575 INJ GADOTERATE MEGLUMI 0.1ML: HCPCS | Performed by: RADIOLOGY

## 2018-03-02 PROCEDURE — 72158 MRI LUMBAR SPINE W/O & W/DYE: CPT | Performed by: PHYSICIAN ASSISTANT

## 2018-03-02 RX ORDER — SODIUM CHLORIDE, SODIUM LACTATE, POTASSIUM CHLORIDE, CALCIUM CHLORIDE 600; 310; 30; 20 MG/100ML; MG/100ML; MG/100ML; MG/100ML
INJECTION, SOLUTION INTRAVENOUS CONTINUOUS
Status: DISCONTINUED | OUTPATIENT
Start: 2018-03-02 | End: 2018-03-03

## 2018-03-02 RX ORDER — ACETAMINOPHEN 500 MG
1000 TABLET ORAL ONCE AS NEEDED
Status: ACTIVE | OUTPATIENT
Start: 2018-03-02 | End: 2018-03-02

## 2018-03-02 RX ORDER — NALOXONE HYDROCHLORIDE 0.4 MG/ML
80 INJECTION, SOLUTION INTRAMUSCULAR; INTRAVENOUS; SUBCUTANEOUS AS NEEDED
Status: ACTIVE | OUTPATIENT
Start: 2018-03-02 | End: 2018-03-02

## 2018-03-02 RX ORDER — METOCLOPRAMIDE HYDROCHLORIDE 5 MG/ML
10 INJECTION INTRAMUSCULAR; INTRAVENOUS AS NEEDED
Status: ACTIVE | OUTPATIENT
Start: 2018-03-02 | End: 2018-03-02

## 2018-03-02 RX ORDER — ONDANSETRON 2 MG/ML
4 INJECTION INTRAMUSCULAR; INTRAVENOUS AS NEEDED
Status: ACTIVE | OUTPATIENT
Start: 2018-03-02 | End: 2018-03-02

## 2018-03-02 NOTE — IMAGING NOTE
Pt here for MRI with sedation procedure. NPO since last evening. Pre-procedure vitals stable. IV started without difficulty. Procedure explained and pt verbalized understanding. Pt accompanied by  Mishel @ [phone].

## 2018-03-02 NOTE — ANESTHESIA PREPROCEDURE EVALUATION
PRE-OP EVALUATION    Patient Name: Jeet Ross    Pre-op Diagnosis: :failed back syndrome  Lumbar radiculiitis    Procedure(s):  MRI LUMBAR SPINE    Surgeon(s) and Role:     * Serenity Westfall MD - Primary      Pre-op vitals reviewed.         There is n DILATION/CURETTAGE,DIAGNOSTIC  2003: FOOT SURGERY      Comment: Rt. foot fx. repair  2002: FOOT SURGERY      Comment: neuroma left foot  2000: GASTRIC BYPASS,OBESITY,SB RECONSTRUC  1980: HC  SECTION LEVEL I  2003: HERNIA SURGERY      Com

## 2018-03-02 NOTE — ANESTHESIA POSTPROCEDURE EVALUATION
Lanette 49 Patient Status:  Outpatient   Age/Gender 58year old female MRN [id number]   [facility] MRI Attending [facility], [address] Day # 0 PCP Maude Diamond MD       Anesthesia Post-op Note    * No procedures lis

## 2018-03-08 ENCOUNTER — HOSPITAL ENCOUNTER (OUTPATIENT)
Dept: GENERAL RADIOLOGY | Age: 63
Discharge: HOME | End: 2018-03-08
Attending: PHYSICIAN ASSISTANT
Payer: COMMERCIAL

## 2018-03-08 DIAGNOSIS — Z96.653 HISTORY OF BILATERAL KNEE REPLACEMENT: ICD-10-CM

## 2018-03-08 PROCEDURE — 73560 X-RAY EXAM OF KNEE 1 OR 2: CPT | Performed by: PHYSICIAN ASSISTANT

## 2018-03-14 ENCOUNTER — OFFICE VISIT (OUTPATIENT)
Dept: SURGERY | Facility: CLINIC | Age: 63
End: 2018-03-14

## 2018-03-14 VITALS
BODY MASS INDEX: 40.57 KG/M2 | SYSTOLIC BLOOD PRESSURE: 150 MMHG | HEART RATE: 88 BPM | DIASTOLIC BLOOD PRESSURE: 80 MMHG | WEIGHT: 229 LBS | HEIGHT: 63 IN

## 2018-03-14 DIAGNOSIS — M47.817 SPONDYLOSIS OF LUMBOSACRAL REGION, UNSPECIFIED SPINAL OSTEOARTHRITIS COMPLICATION STATUS: ICD-10-CM

## 2018-03-14 DIAGNOSIS — Z96.653 STATUS POST BILATERAL KNEE REPLACEMENTS: ICD-10-CM

## 2018-03-14 DIAGNOSIS — M47.816 LUMBAR FACET ARTHROPATHY: ICD-10-CM

## 2018-03-14 DIAGNOSIS — M46.1 BILATERAL SACROILIITIS (HCC): Primary | ICD-10-CM

## 2018-03-14 PROCEDURE — 99214 OFFICE O/P EST MOD 30 MIN: CPT | Performed by: NURSE PRACTITIONER

## 2018-03-14 RX ORDER — METHYLPHENIDATE HYDROCHLORIDE 20 MG/1
TABLET ORAL
Refills: 0 | COMMUNITY
Start: 2018-02-28 | End: 2019-02-05 | Stop reason: ALTCHOICE

## 2018-03-14 RX ORDER — QUETIAPINE 400 MG/1
TABLET, FILM COATED ORAL
Refills: 1 | COMMUNITY
Start: 2018-02-23 | End: 2019-02-05 | Stop reason: ALTCHOICE

## 2018-03-14 RX ORDER — BUPROPION HYDROCHLORIDE 150 MG/1
TABLET ORAL
Refills: 0 | COMMUNITY
Start: 2018-02-27 | End: 2019-02-05 | Stop reason: ALTCHOICE

## 2018-03-14 NOTE — PATIENT INSTRUCTIONS
Refill policies:    • Allow 2-3 business days for refills; controlled substances may take longer.   • Contact your pharmacy at least 5 days prior to running out of medication and have them send an electronic request or submit request through the [facility] for the entire amount billed. Precertification and Prior Authorizations  If your physician has recommended that you have a procedure or additional testing performed.   [employer] ([employer]) will contact your insurance carrier to obtain pr and forgetful. You may not remember anything told to you after your procedure including discharge instructions. • Please park in the [employer] parking garage and follow the signs to the [employer].   • Please bring your Insurance Card, Photo ID, List of Curre procedure within 48 hours of the scheduled date, your procedure will be cancelled and rescheduled to a later date. Please contact your insurance carrier to determine what your financial  responsibility will be for the procedure(s).     Cancellation/Resche medication. Will the sacroiliac injection hurt? The sacroiliac injection involves inserting a needle through skin and deeper tissues. The skin and deeper tissues are numbed with a local anesthetic before inserting the needle into the joint.  Some patien Can I go to work to work the next day? You should be able to return to your work the next day. The most common symptom you may feel is soreness at the injection site.    How long does the effect of the medication last?  The immediate effect is usually fr our office immediately if you experience any side effects These effects usually resolve within a few days. Facet Joint Injections  What is a facet joint injection?   A facet joint injection is an injection of an anti-inflammatory medication, or steroid can make the procedure easier to tolerate. This type of sedation may cause amnesia and patients may not remember parts or all of the actual procedure. How is the facet joint injection performed? It is done with the patient on their stomach.  All patients you may be a candidate for a different procedure. It is important to keep follow up appointment to clearly communicate with your provider how you are feeling. Will the facet joint injection help me?   It is very difficult to predict how helpful injections

## 2018-03-14 NOTE — PROGRESS NOTES
Name: Jeet Ross   : 1955   DOS: 3/14/2018     Pain Clinic Follow Up Visit:   Jeet Ross is a 58year old female who presents for recheck of her chronic low back pain. She is experiencing bilateral sacral area pain.  She states that often she Furoate-Vilanterol (BREO ELLIPTA IN) Inhale 1 puff into the lungs as needed. Disp:  Rfl:    Multiple Vitamins-Minerals (TAB-A-GAETANO MAXIMUM) Oral Tab Take 1 tablet by mouth daily.  Disp:  Rfl:    oxyCODONE-acetaminophen (PERCOCET)  MG Oral Tab Take 1 straight leg raises are negative bilaterally. Extremities: No pain to palpation over bilateral greater trochanteric bursae. No pain to palpation over hip joints bilaterally. No tenderness to palpation bilateral knee joints today.     ASSESSMENT AND PLAN:

## 2018-03-15 ENCOUNTER — TELEPHONE (OUTPATIENT)
Dept: SURGERY | Facility: CLINIC | Age: 63
End: 2018-03-15

## 2018-03-15 DIAGNOSIS — M47.816 LUMBAR FACET ARTHROPATHY: ICD-10-CM

## 2018-03-15 DIAGNOSIS — Z96.653 HISTORY OF KNEE REPLACEMENT, TOTAL, BILATERAL: ICD-10-CM

## 2018-03-15 DIAGNOSIS — M25.562 CHRONIC PAIN OF BOTH KNEES: Primary | ICD-10-CM

## 2018-03-15 DIAGNOSIS — M46.1 BILATERAL SACROILIITIS (HCC): ICD-10-CM

## 2018-03-15 DIAGNOSIS — M47.817 SPONDYLOSIS OF LUMBOSACRAL REGION, UNSPECIFIED SPINAL OSTEOARTHRITIS COMPLICATION STATUS: ICD-10-CM

## 2018-03-15 DIAGNOSIS — G89.29 CHRONIC PAIN OF BOTH KNEES: Primary | ICD-10-CM

## 2018-03-15 DIAGNOSIS — M25.561 CHRONIC PAIN OF BOTH KNEES: Primary | ICD-10-CM

## 2018-03-26 ENCOUNTER — TELEPHONE (OUTPATIENT)
Dept: NEUROLOGY | Facility: CLINIC | Age: 63
End: 2018-03-26

## 2018-03-29 ENCOUNTER — TELEPHONE (OUTPATIENT)
Dept: NEUROLOGY | Facility: CLINIC | Age: 63
End: 2018-03-29

## 2018-04-09 ENCOUNTER — TELEPHONE (OUTPATIENT)
Dept: NEUROLOGY | Facility: CLINIC | Age: 63
End: 2018-04-09

## 2018-04-13 ENCOUNTER — TELEPHONE (OUTPATIENT)
Dept: SURGERY | Facility: CLINIC | Age: 63
End: 2018-04-13

## 2018-04-13 NOTE — TELEPHONE ENCOUNTER
Spoke to patient confirmed procedure date of 4/19/18 and to be checked in at outpatient registration at 10am. Patient instructed to call pre-procedure line before procedure at [phone].  Patient instructed to call office if there are additional question

## 2018-04-19 ENCOUNTER — SURGERY (OUTPATIENT)
Age: 63
End: 2018-04-19

## 2018-04-19 ENCOUNTER — APPOINTMENT (OUTPATIENT)
Dept: GENERAL RADIOLOGY | Facility: HOSPITAL | Age: 63
End: 2018-04-19
Attending: ANESTHESIOLOGY
Payer: COMMERCIAL

## 2018-04-19 ENCOUNTER — HOSPITAL ENCOUNTER (OUTPATIENT)
Facility: HOSPITAL | Age: 63
Setting detail: HOSPITAL OUTPATIENT SURGERY
Discharge: HOME | End: 2018-04-19
Attending: ANESTHESIOLOGY | Admitting: ANESTHESIOLOGY
Payer: COMMERCIAL

## 2018-04-19 VITALS
HEART RATE: 89 BPM | SYSTOLIC BLOOD PRESSURE: 129 MMHG | TEMPERATURE: 98 F | RESPIRATION RATE: 18 BRPM | OXYGEN SATURATION: 94 % | DIASTOLIC BLOOD PRESSURE: 105 MMHG

## 2018-04-19 DIAGNOSIS — Z96.653 HISTORY OF KNEE REPLACEMENT, TOTAL, BILATERAL: ICD-10-CM

## 2018-04-19 DIAGNOSIS — M25.561 CHRONIC PAIN OF BOTH KNEES: ICD-10-CM

## 2018-04-19 DIAGNOSIS — M25.562 CHRONIC PAIN OF BOTH KNEES: ICD-10-CM

## 2018-04-19 DIAGNOSIS — G89.29 CHRONIC PAIN OF BOTH KNEES: ICD-10-CM

## 2018-04-19 PROCEDURE — 77002 NEEDLE LOCALIZATION BY XRAY: CPT | Performed by: ANESTHESIOLOGY

## 2018-04-19 PROCEDURE — 3E0T3BZ INTRODUCTION OF ANESTHETIC AGENT INTO PERIPHERAL NERVES AND PLEXI, PERCUTANEOUS APPROACH: ICD-10-PCS | Performed by: ANESTHESIOLOGY

## 2018-04-19 PROCEDURE — 99152 MOD SED SAME PHYS/QHP 5/>YRS: CPT | Performed by: ANESTHESIOLOGY

## 2018-04-19 PROCEDURE — 3E0T33Z INTRODUCTION OF ANTI-INFLAMMATORY INTO PERIPHERAL NERVES AND PLEXI, PERCUTANEOUS APPROACH: ICD-10-PCS | Performed by: ANESTHESIOLOGY

## 2018-04-19 RX ORDER — DIPHENHYDRAMINE HYDROCHLORIDE 50 MG/ML
50 INJECTION INTRAMUSCULAR; INTRAVENOUS ONCE AS NEEDED
Status: DISCONTINUED | OUTPATIENT
Start: 2018-04-19 | End: 2018-04-19

## 2018-04-19 RX ORDER — SODIUM CHLORIDE, SODIUM LACTATE, POTASSIUM CHLORIDE, CALCIUM CHLORIDE 600; 310; 30; 20 MG/100ML; MG/100ML; MG/100ML; MG/100ML
INJECTION, SOLUTION INTRAVENOUS CONTINUOUS
Status: DISCONTINUED | OUTPATIENT
Start: 2018-04-19 | End: 2018-04-19

## 2018-04-19 RX ORDER — ONDANSETRON 2 MG/ML
4 INJECTION INTRAMUSCULAR; INTRAVENOUS ONCE AS NEEDED
Status: DISCONTINUED | OUTPATIENT
Start: 2018-04-19 | End: 2018-04-19

## 2018-04-19 RX ORDER — METHYLPREDNISOLONE ACETATE 40 MG/ML
INJECTION, SUSPENSION INTRA-ARTICULAR; INTRALESIONAL; INTRAMUSCULAR; SOFT TISSUE AS NEEDED
Status: DISCONTINUED | OUTPATIENT
Start: 2018-04-19 | End: 2018-04-19 | Stop reason: HOSPADM

## 2018-04-19 RX ORDER — SODIUM CHLORIDE, SODIUM LACTATE, POTASSIUM CHLORIDE, CALCIUM CHLORIDE 600; 310; 30; 20 MG/100ML; MG/100ML; MG/100ML; MG/100ML
100 INJECTION, SOLUTION INTRAVENOUS CONTINUOUS
Status: DISCONTINUED | OUTPATIENT
Start: 2018-04-19 | End: 2018-04-19

## 2018-04-19 RX ORDER — LIDOCAINE HYDROCHLORIDE 10 MG/ML
INJECTION, SOLUTION EPIDURAL; INFILTRATION; INTRACAUDAL; PERINEURAL AS NEEDED
Status: DISCONTINUED | OUTPATIENT
Start: 2018-04-19 | End: 2018-04-19 | Stop reason: HOSPADM

## 2018-04-19 RX ORDER — MIDAZOLAM HYDROCHLORIDE 1 MG/ML
INJECTION INTRAMUSCULAR; INTRAVENOUS AS NEEDED
Status: DISCONTINUED | OUTPATIENT
Start: 2018-04-19 | End: 2018-04-19 | Stop reason: HOSPADM

## 2018-04-19 NOTE — OPERATIVE REPORT
[facility]  Operative Report  2018     Daina Flowers Patient Status:  Hospital Outpatient Surgery    1955 MRN [id number]   Location [address] [place] [address] Attending Kyleigh Lundberg MD   Hosp Day # 0 PCP Criss Carver epicondyle to superior medial geniculate nerve. Under fluoroscopic guidance, a 22-gauge, 3.5 inch needle was advanced to the junction of the medial tibia was shaft and medial epicondyle to inferior medial geniculate nerve.  AP and lateral radiographs were

## 2018-04-20 ENCOUNTER — TELEPHONE (OUTPATIENT)
Dept: SURGERY | Facility: CLINIC | Age: 63
End: 2018-04-20

## 2018-04-26 ENCOUNTER — HOSPITAL ENCOUNTER (OUTPATIENT)
Facility: HOSPITAL | Age: 63
Setting detail: HOSPITAL OUTPATIENT SURGERY
Discharge: HOME | End: 2018-04-26
Attending: ANESTHESIOLOGY | Admitting: ANESTHESIOLOGY
Payer: COMMERCIAL

## 2018-04-26 ENCOUNTER — SURGERY (OUTPATIENT)
Age: 63
End: 2018-04-26

## 2018-04-26 ENCOUNTER — APPOINTMENT (OUTPATIENT)
Dept: GENERAL RADIOLOGY | Facility: HOSPITAL | Age: 63
End: 2018-04-26
Attending: ANESTHESIOLOGY
Payer: COMMERCIAL

## 2018-04-26 VITALS
OXYGEN SATURATION: 96 % | RESPIRATION RATE: 16 BRPM | DIASTOLIC BLOOD PRESSURE: 91 MMHG | SYSTOLIC BLOOD PRESSURE: 107 MMHG | HEART RATE: 73 BPM | TEMPERATURE: 99 F

## 2018-04-26 DIAGNOSIS — M46.1 BILATERAL SACROILIITIS (HCC): ICD-10-CM

## 2018-04-26 PROCEDURE — 99152 MOD SED SAME PHYS/QHP 5/>YRS: CPT | Performed by: ANESTHESIOLOGY

## 2018-04-26 PROCEDURE — 3E0U33Z INTRODUCTION OF ANTI-INFLAMMATORY INTO JOINTS, PERCUTANEOUS APPROACH: ICD-10-PCS | Performed by: ANESTHESIOLOGY

## 2018-04-26 PROCEDURE — 3E0U3BZ INTRODUCTION OF ANESTHETIC AGENT INTO JOINTS, PERCUTANEOUS APPROACH: ICD-10-PCS | Performed by: ANESTHESIOLOGY

## 2018-04-26 RX ORDER — METHYLPREDNISOLONE ACETATE 40 MG/ML
INJECTION, SUSPENSION INTRA-ARTICULAR; INTRALESIONAL; INTRAMUSCULAR; SOFT TISSUE AS NEEDED
Status: DISCONTINUED | OUTPATIENT
Start: 2018-04-26 | End: 2018-04-26 | Stop reason: HOSPADM

## 2018-04-26 RX ORDER — MIDAZOLAM HYDROCHLORIDE 1 MG/ML
INJECTION INTRAMUSCULAR; INTRAVENOUS AS NEEDED
Status: DISCONTINUED | OUTPATIENT
Start: 2018-04-26 | End: 2018-04-26 | Stop reason: HOSPADM

## 2018-04-26 RX ORDER — SODIUM CHLORIDE, SODIUM LACTATE, POTASSIUM CHLORIDE, CALCIUM CHLORIDE 600; 310; 30; 20 MG/100ML; MG/100ML; MG/100ML; MG/100ML
100 INJECTION, SOLUTION INTRAVENOUS CONTINUOUS
Status: DISCONTINUED | OUTPATIENT
Start: 2018-04-26 | End: 2018-04-26

## 2018-04-26 RX ORDER — ONDANSETRON 2 MG/ML
4 INJECTION INTRAMUSCULAR; INTRAVENOUS ONCE AS NEEDED
Status: DISCONTINUED | OUTPATIENT
Start: 2018-04-26 | End: 2018-04-26

## 2018-04-26 RX ORDER — LIDOCAINE HYDROCHLORIDE 10 MG/ML
INJECTION, SOLUTION EPIDURAL; INFILTRATION; INTRACAUDAL; PERINEURAL AS NEEDED
Status: DISCONTINUED | OUTPATIENT
Start: 2018-04-26 | End: 2018-04-26 | Stop reason: HOSPADM

## 2018-04-26 RX ORDER — DIPHENHYDRAMINE HYDROCHLORIDE 50 MG/ML
50 INJECTION INTRAMUSCULAR; INTRAVENOUS ONCE AS NEEDED
Status: DISCONTINUED | OUTPATIENT
Start: 2018-04-26 | End: 2018-04-26

## 2018-04-26 NOTE — OPERATIVE REPORT
[facility]  Operative Report  2018     Vickye Dubin Patient Status:  Hospital Outpatient Surgery    1955 MRN [id number]   Location [facility] Attending No att. providers found   [facility] Day # 0 PCP Kaila Prabhakar spinal needle was advanced into the middle portion of the first sacroiliac joint. After the needle  positions were confirmed by AP and lateral views of fluoroscopy, 1 cc Omnipaque-240 was injected into the sacroiliac joint.  There was a nice sacroiliac join

## 2018-04-26 NOTE — H&P
History & Physical Examination    Patient Name: Daina Flowers  MRN: [id number]  [facility]: [id number]  YOB: 1955    Pre-Operative Diagnosis:  Bilateral [facility]ili[facility] [M46.1]    Present Illness: A 58year old female with low back pain is here fo LEVOTHROID) 25 MCG Oral Tab Take 50 mcg by mouth before breakfast.   Disp:  Rfl:  Taking   torsemide (DEMADEX) 20 MG Oral Tab Take 20 mg by mouth 2 (two) times daily.    Disp:  Rfl:  Taking   Losartan Potassium (COZAAR) 25 MG Oral Tab Take 25 mg by mouth da stone removed  1997, 2002: DILATION/CURETTAGE,DIAGNOSTIC  2003: FOOT SURGERY      Comment: Rt. foot fx. repair  2002: FOOT SURGERY      Comment: neuroma left foot  2000: GASTRIC BYPASS,OBESITY,SB RECONSTRUC  1980: HC  SECTION LEVEL I  0

## 2018-04-26 NOTE — H&P
History & Physical Examination    Patient Name: Deena Thompson  MRN: [id number]  [facility]: [id number]  YOB: 1955    Pre-Operative Diagnosis:  B[facility]ili[facility] [M46.1]    Present Illness: A 58year old female with low back pain is here fo LEVOTHROID) 25 MCG Oral Tab Take 50 mcg by mouth before breakfast.   Disp:  Rfl:  Taking   torsemide (DEMADEX) 20 MG Oral Tab Take 20 mg by mouth 2 (two) times daily.    Disp:  Rfl:  Taking   Losartan Potassium (COZAAR) 25 MG Oral Tab Take 25 mg by mouth da stone removed  1997, 2002: DILATION/CURETTAGE,DIAGNOSTIC  2003: FOOT SURGERY      Comment: Rt. foot fx. repair  2002: FOOT SURGERY      Comment: neuroma left foot  2000: GASTRIC BYPASS,OBESITY,SB RECONSTRUC  1980: HC  SECTION LEVEL I  0

## 2018-04-27 ENCOUNTER — TELEPHONE (OUTPATIENT)
Dept: SURGERY | Facility: CLINIC | Age: 63
End: 2018-04-27

## 2018-04-27 NOTE — TELEPHONE ENCOUNTER
Spoke to patient confirmed procedure date of 5/3/18 and to be checked in at outpatient registration at 10am. Patient called pre-procedure line and understood instructions.  Patient instructed to call office if there are additional questions after listening

## 2018-04-30 ENCOUNTER — LAB ENCOUNTER (OUTPATIENT)
Dept: LAB | Age: 63
End: 2018-04-30
Attending: INTERNAL MEDICINE
Payer: COMMERCIAL

## 2018-04-30 DIAGNOSIS — I10 ESSENTIAL HYPERTENSION, MALIGNANT: Primary | ICD-10-CM

## 2018-04-30 DIAGNOSIS — G89.29 OTHER CHRONIC PAIN: ICD-10-CM

## 2018-04-30 DIAGNOSIS — D51.9 VITAMIN B12 DEFICIENCY ANEMIA: ICD-10-CM

## 2018-04-30 PROCEDURE — 84443 ASSAY THYROID STIM HORMONE: CPT

## 2018-04-30 PROCEDURE — 84439 ASSAY OF FREE THYROXINE: CPT

## 2018-04-30 PROCEDURE — 82306 VITAMIN D 25 HYDROXY: CPT

## 2018-04-30 PROCEDURE — 83036 HEMOGLOBIN GLYCOSYLATED A1C: CPT

## 2018-04-30 PROCEDURE — 80061 LIPID PANEL: CPT

## 2018-04-30 PROCEDURE — 80053 COMPREHEN METABOLIC PANEL: CPT

## 2018-04-30 PROCEDURE — 36415 COLL VENOUS BLD VENIPUNCTURE: CPT

## 2018-04-30 PROCEDURE — 85025 COMPLETE CBC W/AUTO DIFF WBC: CPT

## 2018-04-30 PROCEDURE — 82607 VITAMIN B-12: CPT

## 2018-05-03 ENCOUNTER — APPOINTMENT (OUTPATIENT)
Dept: GENERAL RADIOLOGY | Facility: HOSPITAL | Age: 63
End: 2018-05-03
Attending: ANESTHESIOLOGY
Payer: COMMERCIAL

## 2018-05-03 ENCOUNTER — SURGERY (OUTPATIENT)
Age: 63
End: 2018-05-03

## 2018-05-03 ENCOUNTER — HOSPITAL ENCOUNTER (OUTPATIENT)
Facility: HOSPITAL | Age: 63
Setting detail: HOSPITAL OUTPATIENT SURGERY
Discharge: HOME | End: 2018-05-03
Attending: ANESTHESIOLOGY | Admitting: ANESTHESIOLOGY
Payer: COMMERCIAL

## 2018-05-03 VITALS
HEART RATE: 71 BPM | TEMPERATURE: 98 F | SYSTOLIC BLOOD PRESSURE: 147 MMHG | RESPIRATION RATE: 18 BRPM | DIASTOLIC BLOOD PRESSURE: 77 MMHG | OXYGEN SATURATION: 95 %

## 2018-05-03 DIAGNOSIS — M47.817 SPONDYLOSIS OF LUMBOSACRAL REGION, UNSPECIFIED SPINAL OSTEOARTHRITIS COMPLICATION STATUS: ICD-10-CM

## 2018-05-03 DIAGNOSIS — M47.816 LUMBAR FACET ARTHROPATHY: ICD-10-CM

## 2018-05-03 PROCEDURE — 3E0U33Z INTRODUCTION OF ANTI-INFLAMMATORY INTO JOINTS, PERCUTANEOUS APPROACH: ICD-10-PCS | Performed by: ANESTHESIOLOGY

## 2018-05-03 PROCEDURE — 99152 MOD SED SAME PHYS/QHP 5/>YRS: CPT | Performed by: ANESTHESIOLOGY

## 2018-05-03 PROCEDURE — BR161ZZ FLUOROSCOPY OF LUMBAR FACET JOINT(S) USING LOW OSMOLAR CONTRAST: ICD-10-PCS | Performed by: ANESTHESIOLOGY

## 2018-05-03 PROCEDURE — 3E0U3BZ INTRODUCTION OF ANESTHETIC AGENT INTO JOINTS, PERCUTANEOUS APPROACH: ICD-10-PCS | Performed by: ANESTHESIOLOGY

## 2018-05-03 RX ORDER — MIDAZOLAM HYDROCHLORIDE 1 MG/ML
INJECTION INTRAMUSCULAR; INTRAVENOUS AS NEEDED
Status: DISCONTINUED | OUTPATIENT
Start: 2018-05-03 | End: 2018-05-03 | Stop reason: HOSPADM

## 2018-05-03 RX ORDER — METHYLPREDNISOLONE ACETATE 40 MG/ML
INJECTION, SUSPENSION INTRA-ARTICULAR; INTRALESIONAL; INTRAMUSCULAR; SOFT TISSUE AS NEEDED
Status: DISCONTINUED | OUTPATIENT
Start: 2018-05-03 | End: 2018-05-03 | Stop reason: HOSPADM

## 2018-05-03 RX ORDER — DIPHENHYDRAMINE HYDROCHLORIDE 50 MG/ML
50 INJECTION INTRAMUSCULAR; INTRAVENOUS ONCE AS NEEDED
Status: CANCELLED | OUTPATIENT
Start: 2018-05-03 | End: 2018-05-03

## 2018-05-03 RX ORDER — SODIUM CHLORIDE, SODIUM LACTATE, POTASSIUM CHLORIDE, CALCIUM CHLORIDE 600; 310; 30; 20 MG/100ML; MG/100ML; MG/100ML; MG/100ML
100 INJECTION, SOLUTION INTRAVENOUS CONTINUOUS
Status: DISCONTINUED | OUTPATIENT
Start: 2018-05-03 | End: 2018-05-03

## 2018-05-03 RX ORDER — LIDOCAINE HYDROCHLORIDE 10 MG/ML
INJECTION, SOLUTION EPIDURAL; INFILTRATION; INTRACAUDAL; PERINEURAL AS NEEDED
Status: DISCONTINUED | OUTPATIENT
Start: 2018-05-03 | End: 2018-05-03 | Stop reason: HOSPADM

## 2018-05-03 RX ORDER — ONDANSETRON 2 MG/ML
4 INJECTION INTRAMUSCULAR; INTRAVENOUS ONCE AS NEEDED
Status: CANCELLED | OUTPATIENT
Start: 2018-05-03 | End: 2018-05-03

## 2018-05-03 NOTE — OPERATIVE REPORT
[facility]  Operative Report  5/3/2018     Tyra Talavera Patient Status:  Hospital Outpatient Surgery    1955 MRN [id number]   Location [address] [place] [address] Attending No att. providers found   Hosp Day # 0 PCP Mallika García 25-gauge 1.5\" needle with approximately 2 mL of 1% lidocaine. A 22-gauge 3.5\" Quincke spinal needle was introduced and advanced into each corresponding facet joint at left L2-L3, L3-L4, L4-5 and L5-S1 level atraumatically under fluoroscopic guidance.   June Reyes

## 2018-05-03 NOTE — H&P
History & Physical Examination    Patient Name: Jason Lai  MRN: [id number]  CSN: [id number]  YOB: 1955    Pre-Operative Diagnosis:  Lumbar facet arthropathy ([facility] [M46.96]  Spondylosis of lumbosacral region, unspecified spinal osteoarthrit simvastatin (ZOCOR) 40 MG Oral Tab Take 40 mg by mouth nightly.  Disp:  Rfl:  Taking   Levothyroxine Sodium (SYNTHROID, LEVOTHROID) 25 MCG Oral Tab Take 50 mcg by mouth before breakfast.   Disp:  Rfl:  Taking   torsemide (DEMADEX) 20 MG Oral Tab Take 20 m Chuck Wiseman MD;  Location: [address] [place] [address] ENDOSCOPY  3/2002: Eli Rendon      Comment: kidney stone removed  6/1997, 5/2002: DILATION/CURETTAGE,DIAGNOSTIC  02/2003: FOOT SURGERY      Comment: Rt. foot fx. repair  09/2002: FOOT Gwen Goss, CYNTHIAN

## 2018-05-03 NOTE — OR NURSING
INSTRUCTIONS GIVEN. NO QUESTIONS. REQUESTING TO GO HOME. ABLE TO AMBULATE IN ROOM WITHOUT ASSISTANCE.

## 2018-05-04 ENCOUNTER — TELEPHONE (OUTPATIENT)
Dept: SURGERY | Facility: CLINIC | Age: 63
End: 2018-05-04

## 2018-05-04 NOTE — TELEPHONE ENCOUNTER
Spoke to patient, confirmed procedure date of 5/10/18 and to be checked in at outpatient registration at 10:00 am. Patient called pre-procedure line and understood instructions. Patient instructed to call office if there are additional questions .

## 2018-05-10 ENCOUNTER — HOSPITAL ENCOUNTER (OUTPATIENT)
Facility: HOSPITAL | Age: 63
Setting detail: HOSPITAL OUTPATIENT SURGERY
Discharge: HOME | End: 2018-05-10
Attending: ANESTHESIOLOGY | Admitting: ANESTHESIOLOGY
Payer: COMMERCIAL

## 2018-05-10 ENCOUNTER — APPOINTMENT (OUTPATIENT)
Dept: GENERAL RADIOLOGY | Facility: HOSPITAL | Age: 63
End: 2018-05-10
Attending: ANESTHESIOLOGY
Payer: COMMERCIAL

## 2018-05-10 ENCOUNTER — SURGERY (OUTPATIENT)
Age: 63
End: 2018-05-10

## 2018-05-10 VITALS
TEMPERATURE: 98 F | OXYGEN SATURATION: 92 % | RESPIRATION RATE: 16 BRPM | DIASTOLIC BLOOD PRESSURE: 50 MMHG | HEART RATE: 75 BPM | SYSTOLIC BLOOD PRESSURE: 99 MMHG

## 2018-05-10 DIAGNOSIS — M47.816 LUMBAR FACET ARTHROPATHY: ICD-10-CM

## 2018-05-10 DIAGNOSIS — M47.817 SPONDYLOSIS OF LUMBOSACRAL REGION, UNSPECIFIED SPINAL OSTEOARTHRITIS COMPLICATION STATUS: ICD-10-CM

## 2018-05-10 PROCEDURE — 99152 MOD SED SAME PHYS/QHP 5/>YRS: CPT | Performed by: ANESTHESIOLOGY

## 2018-05-10 PROCEDURE — 3E0U3BZ INTRODUCTION OF ANESTHETIC AGENT INTO JOINTS, PERCUTANEOUS APPROACH: ICD-10-PCS | Performed by: ANESTHESIOLOGY

## 2018-05-10 PROCEDURE — 3E0U33Z INTRODUCTION OF ANTI-INFLAMMATORY INTO JOINTS, PERCUTANEOUS APPROACH: ICD-10-PCS | Performed by: ANESTHESIOLOGY

## 2018-05-10 PROCEDURE — BR161ZZ FLUOROSCOPY OF LUMBAR FACET JOINT(S) USING LOW OSMOLAR CONTRAST: ICD-10-PCS | Performed by: ANESTHESIOLOGY

## 2018-05-10 RX ORDER — METHYLPREDNISOLONE ACETATE 40 MG/ML
INJECTION, SUSPENSION INTRA-ARTICULAR; INTRALESIONAL; INTRAMUSCULAR; SOFT TISSUE AS NEEDED
Status: DISCONTINUED | OUTPATIENT
Start: 2018-05-10 | End: 2018-05-10 | Stop reason: HOSPADM

## 2018-05-10 RX ORDER — MIDAZOLAM HYDROCHLORIDE 1 MG/ML
INJECTION INTRAMUSCULAR; INTRAVENOUS AS NEEDED
Status: DISCONTINUED | OUTPATIENT
Start: 2018-05-10 | End: 2018-05-10 | Stop reason: HOSPADM

## 2018-05-10 RX ORDER — ONDANSETRON 2 MG/ML
4 INJECTION INTRAMUSCULAR; INTRAVENOUS ONCE AS NEEDED
Status: DISCONTINUED | OUTPATIENT
Start: 2018-05-10 | End: 2018-05-10

## 2018-05-10 RX ORDER — DIPHENHYDRAMINE HYDROCHLORIDE 50 MG/ML
50 INJECTION INTRAMUSCULAR; INTRAVENOUS ONCE AS NEEDED
Status: DISCONTINUED | OUTPATIENT
Start: 2018-05-10 | End: 2018-05-10

## 2018-05-10 RX ORDER — LIDOCAINE HYDROCHLORIDE 10 MG/ML
INJECTION, SOLUTION EPIDURAL; INFILTRATION; INTRACAUDAL; PERINEURAL AS NEEDED
Status: DISCONTINUED | OUTPATIENT
Start: 2018-05-10 | End: 2018-05-10 | Stop reason: HOSPADM

## 2018-05-10 RX ORDER — SODIUM CHLORIDE, SODIUM LACTATE, POTASSIUM CHLORIDE, CALCIUM CHLORIDE 600; 310; 30; 20 MG/100ML; MG/100ML; MG/100ML; MG/100ML
100 INJECTION, SOLUTION INTRAVENOUS CONTINUOUS
Status: DISCONTINUED | OUTPATIENT
Start: 2018-05-10 | End: 2018-05-10

## 2018-05-10 NOTE — H&P
History & Physical Examination    Patient Name: Tyra Talavera  MRN: [id number]  [facility]: [id number]  YOB: 1955    Pre-Operative Diagnosis:  Lumbar facet arthropathy [facility] [M46.96]  Spondylosis of lumbosacral region, unspecified spinal osteoarthrit Taking   simvastatin (ZOCOR) 40 MG Oral Tab Take 40 mg by mouth nightly.  Disp:  Rfl:  Taking   Levothyroxine Sodium (SYNTHROID, LEVOTHROID) 25 MCG Oral Tab Take 50 mcg by mouth before breakfast.   Disp:  Rfl:  Taking   torsemide (DEMADEX) 20 MG Oral Tab Ta Lydia Yañez MD;  Location: [facility] ENDOSCOPY  3/2002: Paola Hartman      Comment: kidney stone removed  6/1997, 5/2002: DILATION/CURETTAGE,DIAGNOSTIC  02/2003: FOOT SURGERY      Comment: Rt. foot fx. repair  09/2002: FOOT

## 2018-05-10 NOTE — OR NURSING
IV REMOVED WITH CANNULA INTACT. INSTRUCTIONS GIVEN. NO QUESTIONS. AMBULATING IN ROOM WITHOUT ASSISTANCE. REQUESTING TO GO HOME.

## 2018-05-11 NOTE — OPERATIVE REPORT
[facility]  Operative Report  5/10/2018     Yolanda Sofia Patient Status:  Hospital Outpatient Surgery    1955 MRN [id number]   Location [facility] Attending No att. providers found   [facility] Day # 0 DILLON Good 25-gauge 1.5\" needle with approximately 2 mL of 1% lidocaine. A 22-gauge 3.5\" Quincke spinal needle was introduced and advanced into each corresponding facet joint at right L2-L3, L3-L4, L4-5 and L5-S1 level atraumatically under fluoroscopic guidance.

## 2018-09-11 ENCOUNTER — TELEPHONE (OUTPATIENT)
Dept: SURGERY | Facility: CLINIC | Age: 63
End: 2018-09-11

## 2018-09-11 DIAGNOSIS — M54.16 LUMBAR RADICULITIS: ICD-10-CM

## 2018-09-11 DIAGNOSIS — M54.5 CHRONIC BILATERAL LOW BACK PAIN, WITH SCIATICA PRESENCE UNSPECIFIED: Primary | ICD-10-CM

## 2018-09-11 DIAGNOSIS — G89.29 CHRONIC BILATERAL LOW BACK PAIN, WITH SCIATICA PRESENCE UNSPECIFIED: Primary | ICD-10-CM

## 2018-09-12 NOTE — TELEPHONE ENCOUNTER
Munira Morrison from  checking status for new MRI to be placed MRI w/wo contrast under sedation.  Please contact [phone]

## 2018-09-13 NOTE — TELEPHONE ENCOUNTER
CURT Odell at central scheduling. Need clarification as to what order needs to be replaced. Pt had MRI of Lumbar spine 3-2-18.

## 2018-09-13 NOTE — TELEPHONE ENCOUNTER
Spoke with Cass from central scheduling. Pt had called CS and stated that she needs a new MRI and that the order needs be placed with sedation. Explained that there are not any office visit notes to support ordering a new MRI.

## 2018-09-14 NOTE — TELEPHONE ENCOUNTER
Placed new order or MRI lumbar spine with sedation. She requires medical clearance with her PCP prior to MRI.

## 2019-02-05 ENCOUNTER — OFFICE VISIT (OUTPATIENT)
Dept: NEUROLOGY | Facility: CLINIC | Age: 64
End: 2019-02-05
Payer: COMMERCIAL

## 2019-02-05 ENCOUNTER — TELEPHONE (OUTPATIENT)
Dept: NEUROLOGY | Facility: CLINIC | Age: 64
End: 2019-02-05

## 2019-02-05 VITALS
HEIGHT: 63 IN | HEART RATE: 74 BPM | WEIGHT: 234 LBS | DIASTOLIC BLOOD PRESSURE: 76 MMHG | SYSTOLIC BLOOD PRESSURE: 120 MMHG | RESPIRATION RATE: 16 BRPM | BODY MASS INDEX: 41.46 KG/M2

## 2019-02-05 DIAGNOSIS — M62.838 SPASM OF MUSCLE: Primary | ICD-10-CM

## 2019-02-05 DIAGNOSIS — M54.2 PAIN IN THE NECK: ICD-10-CM

## 2019-02-05 DIAGNOSIS — M79.604 PAIN IN BOTH LOWER EXTREMITIES: ICD-10-CM

## 2019-02-05 DIAGNOSIS — M79.621 PAIN IN BOTH UPPER ARMS: ICD-10-CM

## 2019-02-05 DIAGNOSIS — M79.671 PAIN IN BOTH FEET: ICD-10-CM

## 2019-02-05 DIAGNOSIS — M79.672 PAIN IN BOTH FEET: ICD-10-CM

## 2019-02-05 DIAGNOSIS — M79.622 PAIN IN BOTH UPPER ARMS: ICD-10-CM

## 2019-02-05 DIAGNOSIS — M79.605 PAIN IN BOTH LOWER EXTREMITIES: ICD-10-CM

## 2019-02-05 DIAGNOSIS — R25.9 ABNORMAL INVOLUNTARY MOVEMENT: ICD-10-CM

## 2019-02-05 PROCEDURE — 99205 OFFICE O/P NEW HI 60 MIN: CPT | Performed by: OTHER

## 2019-02-05 RX ORDER — CLOTRIMAZOLE AND BETAMETHASONE DIPROPIONATE 10; .64 MG/G; MG/G
CREAM TOPICAL
Refills: 3 | COMMUNITY
Start: 2019-01-08 | End: 2019-02-05 | Stop reason: ALTCHOICE

## 2019-02-05 RX ORDER — CYCLOBENZAPRINE HCL 10 MG
10 TABLET ORAL 3 TIMES DAILY PRN
Qty: 30 TABLET | Refills: 1 | Status: SHIPPED | OUTPATIENT
Start: 2019-02-05 | End: 2019-02-26 | Stop reason: ALTCHOICE

## 2019-02-05 RX ORDER — DOXYCYCLINE HYCLATE 100 MG/1
CAPSULE ORAL
Refills: 1 | COMMUNITY
Start: 2019-01-08 | End: 2019-02-05 | Stop reason: ALTCHOICE

## 2019-02-05 RX ORDER — METFORMIN HYDROCHLORIDE 500 MG/1
500 TABLET, EXTENDED RELEASE ORAL NIGHTLY
COMMUNITY
End: 2021-04-06

## 2019-02-05 RX ORDER — TRAMADOL HYDROCHLORIDE 50 MG/1
TABLET ORAL
Refills: 0 | COMMUNITY
Start: 2019-01-29 | End: 2019-02-05 | Stop reason: ALTCHOICE

## 2019-02-05 NOTE — TELEPHONE ENCOUNTER
[facility] Arceo. Per pharmacist they are concerned about interaction between Lyrica and cyclobenzaprine and risk of Serotonin syndrome.  I told pharmacy to fill prescriptions as written and that patient would be monitored

## 2019-02-05 NOTE — PROGRESS NOTES
[facility] [address]   Neurology; INITIAL CLINIC VISIT  2019, 3:36 PM     Kristi Jaime Patient Status:  No patient class for patient encounter    1955 MRN [id number]   Location [facility] (EGD) N/A 8/10/2017    Performed by Jose Miguel Hawley MD at [facility] ENDOSCOPY   • FOOT SURGERY  02/2003    Rt.  foot fx. repair   • FOOT SURGERY  09/2002    neuroma left foot   • GASTRIC BYPASS,OBESITY,SB RECONSTRUC  8/2000   • GENICULAR NERVE BLOCK Bilateral 4 Redness    MEDICATIONS:    Current Outpatient Medications:  LYRICA 50 MG Oral Cap TK 1 C PO TID Disp:  Rfl: 1   MetFORMIN HCl  MG Oral Tablet 24 Hr Take 500 mg by mouth daily with dinner.  Disp:  Rfl:    Cyclobenzaprine HCl 10 MG Oral Tab Take 1 table nocturia  GYNE/: no dysuria, urgency or frequency; no urinary incontinence  MUSCULOSKELETAL: no joint complaints in  upper or lower extremities  NEURO: see HPI;  PSYCHE: no symptoms of depression or anxiety  HEMATOLOGY:  denies bruising or excessive blee light touch, temperature, vibration and proprioception;  DTRs; Symmetric in both arms and legs, including biceps, triceps, knees, absent in ankles,  Babinski sign is negative;   Coordination: Normal FTN and HTS;   Gait: Normal based, slow, cautious,  Rombe S40.801) Pain in both feet        Summery:  She has new pain and spasm in her hands and feet last week, involuntary spasm, unclear reason, likely sensory neuropathy or entrapment neuropathy or cervical radiculopathy,   Will check CTH, EEG   EMG of all four

## 2019-02-19 PROBLEM — M75.102 ROTATOR CUFF SYNDROME OF LEFT SHOULDER: Status: ACTIVE | Noted: 2019-02-19

## 2019-02-19 PROBLEM — Z87.39 H/O ROTATOR CUFF TEAR: Status: ACTIVE | Noted: 2019-02-19

## 2019-02-22 PROBLEM — T84.84XA PAIN DUE TO TOTAL LEFT KNEE REPLACEMENT, INITIAL ENCOUNTER (HCC): Status: ACTIVE | Noted: 2019-02-22

## 2019-02-22 PROBLEM — Z96.652 PAIN DUE TO TOTAL LEFT KNEE REPLACEMENT, INITIAL ENCOUNTER (HCC): Status: ACTIVE | Noted: 2019-02-22

## 2019-02-22 PROBLEM — T84.84XA PAIN DUE TO TOTAL LEFT KNEE REPLACEMENT, INITIAL ENCOUNTER: Status: ACTIVE | Noted: 2019-02-22

## 2019-02-22 PROBLEM — Z96.651 PAIN DUE TO TOTAL RIGHT KNEE REPLACEMENT, INITIAL ENCOUNTER: Status: ACTIVE | Noted: 2019-02-22

## 2019-02-22 PROBLEM — Z96.652 PAIN DUE TO TOTAL LEFT KNEE REPLACEMENT, INITIAL ENCOUNTER: Status: ACTIVE | Noted: 2019-02-22

## 2019-02-22 PROBLEM — Z96.651 PAIN DUE TO TOTAL RIGHT KNEE REPLACEMENT, INITIAL ENCOUNTER (HCC): Status: ACTIVE | Noted: 2019-02-22

## 2019-02-22 PROBLEM — T84.84XA PAIN DUE TO TOTAL RIGHT KNEE REPLACEMENT, INITIAL ENCOUNTER (HCC): Status: ACTIVE | Noted: 2019-02-22

## 2019-02-22 PROBLEM — T84.84XA PAIN DUE TO TOTAL RIGHT KNEE REPLACEMENT, INITIAL ENCOUNTER: Status: ACTIVE | Noted: 2019-02-22

## 2019-02-25 ENCOUNTER — HOSPITAL ENCOUNTER (OUTPATIENT)
Dept: CT IMAGING | Age: 64
Discharge: HOME | End: 2019-02-25
Attending: Other
Payer: COMMERCIAL

## 2019-02-25 ENCOUNTER — LAB ENCOUNTER (OUTPATIENT)
Dept: LAB | Age: 64
End: 2019-02-25
Attending: INTERNAL MEDICINE
Payer: COMMERCIAL

## 2019-02-25 DIAGNOSIS — M79.621 PAIN IN BOTH UPPER ARMS: ICD-10-CM

## 2019-02-25 DIAGNOSIS — M62.838 SPASM OF MUSCLE: ICD-10-CM

## 2019-02-25 DIAGNOSIS — M79.605 PAIN IN BOTH LOWER EXTREMITIES: ICD-10-CM

## 2019-02-25 DIAGNOSIS — R25.9 ABNORMAL INVOLUNTARY MOVEMENT: ICD-10-CM

## 2019-02-25 DIAGNOSIS — I10 PRIMARY HYPERTENSION: ICD-10-CM

## 2019-02-25 DIAGNOSIS — M79.622 PAIN IN BOTH UPPER ARMS: ICD-10-CM

## 2019-02-25 DIAGNOSIS — M54.2 PAIN IN THE NECK: ICD-10-CM

## 2019-02-25 DIAGNOSIS — M79.604 PAIN IN BOTH LOWER EXTREMITIES: ICD-10-CM

## 2019-02-25 DIAGNOSIS — G89.29 OTHER CHRONIC PAIN: Primary | ICD-10-CM

## 2019-02-25 DIAGNOSIS — E11.9 DIABETES MELLITUS (HCC): ICD-10-CM

## 2019-02-25 DIAGNOSIS — M79.2 NEURALGIA AND NEURITIS: ICD-10-CM

## 2019-02-25 LAB
ALBUMIN SERPL-MCNC: 3.5 G/DL (ref 3.4–5)
ALBUMIN/GLOB SERPL: 1 {RATIO} (ref 1–2)
ALP LIVER SERPL-CCNC: 111 U/L (ref 50–130)
ALT SERPL-CCNC: 24 U/L (ref 13–56)
ANA SCREEN: NEGATIVE
ANION GAP SERPL CALC-SCNC: 9 MMOL/L (ref 0–18)
AST SERPL-CCNC: 22 U/L (ref 15–37)
BASOPHILS # BLD AUTO: 0.04 X10(3) UL (ref 0–0.2)
BASOPHILS NFR BLD AUTO: 0.6 %
BILIRUB SERPL-MCNC: 0.4 MG/DL (ref 0.1–2)
BUN BLD-MCNC: 16 MG/DL (ref 7–18)
BUN/CREAT SERPL: 19.5 (ref 10–20)
CALCIUM BLD-MCNC: 9 MG/DL (ref 8.5–10.1)
CHLORIDE SERPL-SCNC: 103 MMOL/L (ref 98–107)
CO2 SERPL-SCNC: 28 MMOL/L (ref 21–32)
CREAT BLD-MCNC: 0.82 MG/DL (ref 0.55–1.02)
CRP SERPL-MCNC: <0.29 MG/DL (ref ?–0.3)
DEPRECATED RDW RBC AUTO: 51 FL (ref 35.1–46.3)
EOSINOPHIL # BLD AUTO: 0.09 X10(3) UL (ref 0–0.7)
EOSINOPHIL NFR BLD AUTO: 1.4 %
ERYTHROCYTE [DISTWIDTH] IN BLOOD BY AUTOMATED COUNT: 17.3 % (ref 11–15)
FOLATE SERPL-MCNC: 80.1 NG/ML (ref 8.7–?)
GLOBULIN PLAS-MCNC: 3.6 G/DL (ref 2.8–4.4)
GLUCOSE BLD-MCNC: 105 MG/DL (ref 70–99)
HAV IGM SER QL: 2.2 MG/DL (ref 1.6–2.6)
HCT VFR BLD AUTO: 40.1 % (ref 35–48)
HGB BLD-MCNC: 12.4 G/DL (ref 12–16)
IMM GRANULOCYTES # BLD AUTO: 0.02 X10(3) UL (ref 0–1)
IMM GRANULOCYTES NFR BLD: 0.3 %
LYMPHOCYTES # BLD AUTO: 1.57 X10(3) UL (ref 1–4)
LYMPHOCYTES NFR BLD AUTO: 23.6 %
M PROTEIN MFR SERPL ELPH: 7.1 G/DL (ref 6.4–8.2)
MCH RBC QN AUTO: 25.2 PG (ref 26–34)
MCHC RBC AUTO-ENTMCNC: 30.9 G/DL (ref 31–37)
MCV RBC AUTO: 81.5 FL (ref 80–100)
MONOCYTES # BLD AUTO: 0.57 X10(3) UL (ref 0.1–1)
MONOCYTES NFR BLD AUTO: 8.6 %
NEUTROPHILS # BLD AUTO: 4.36 X10 (3) UL (ref 1.5–7.7)
NEUTROPHILS # BLD AUTO: 4.36 X10(3) UL (ref 1.5–7.7)
NEUTROPHILS NFR BLD AUTO: 65.5 %
OSMOLALITY SERPL CALC.SUM OF ELEC: 292 MOSM/KG (ref 275–295)
PLATELET # BLD AUTO: 237 10(3)UL (ref 150–450)
POTASSIUM SERPL-SCNC: 3.6 MMOL/L (ref 3.5–5.1)
RBC # BLD AUTO: 4.92 X10(6)UL (ref 3.8–5.3)
RHEUMATOID FACT SERPL-ACNC: <10 IU/ML (ref ?–15)
SED RATE-ML: 18 MM/HR (ref 0–25)
SODIUM SERPL-SCNC: 140 MMOL/L (ref 136–145)
TSI SER-ACNC: 3.02 MIU/ML (ref 0.36–3.74)
VIT B12 SERPL-MCNC: 1143 PG/ML (ref 193–986)
WBC # BLD AUTO: 6.7 X10(3) UL (ref 4–11)

## 2019-02-25 PROCEDURE — 83735 ASSAY OF MAGNESIUM: CPT

## 2019-02-25 PROCEDURE — 85652 RBC SED RATE AUTOMATED: CPT

## 2019-02-25 PROCEDURE — 82607 VITAMIN B-12: CPT

## 2019-02-25 PROCEDURE — 86038 ANTINUCLEAR ANTIBODIES: CPT

## 2019-02-25 PROCEDURE — 86140 C-REACTIVE PROTEIN: CPT

## 2019-02-25 PROCEDURE — 82746 ASSAY OF FOLIC ACID SERUM: CPT

## 2019-02-25 PROCEDURE — 83883 ASSAY NEPHELOMETRY NOT SPEC: CPT

## 2019-02-25 PROCEDURE — 86431 RHEUMATOID FACTOR QUANT: CPT

## 2019-02-25 PROCEDURE — 84165 PROTEIN E-PHORESIS SERUM: CPT

## 2019-02-25 PROCEDURE — 70450 CT HEAD/BRAIN W/O DYE: CPT | Performed by: OTHER

## 2019-02-25 PROCEDURE — 36415 COLL VENOUS BLD VENIPUNCTURE: CPT

## 2019-02-25 PROCEDURE — 84443 ASSAY THYROID STIM HORMONE: CPT

## 2019-02-25 PROCEDURE — 86334 IMMUNOFIX E-PHORESIS SERUM: CPT

## 2019-02-25 PROCEDURE — 80053 COMPREHEN METABOLIC PANEL: CPT

## 2019-02-25 PROCEDURE — 85025 COMPLETE CBC W/AUTO DIFF WBC: CPT

## 2019-02-26 RX ORDER — PHENOL 1.4 %
1 AEROSOL, SPRAY (ML) MUCOUS MEMBRANE NIGHTLY PRN
COMMUNITY
End: 2019-09-16

## 2019-02-26 RX ORDER — MULTIVITAMIN WITH IRON
250 TABLET ORAL DAILY
COMMUNITY
End: 2019-09-16

## 2019-02-26 RX ORDER — SODIUM CHLORIDE, SODIUM LACTATE, POTASSIUM CHLORIDE, CALCIUM CHLORIDE 600; 310; 30; 20 MG/100ML; MG/100ML; MG/100ML; MG/100ML
INJECTION, SOLUTION INTRAVENOUS CONTINUOUS
Status: CANCELLED | OUTPATIENT
Start: 2019-02-26

## 2019-02-28 ENCOUNTER — APPOINTMENT (OUTPATIENT)
Dept: LAB | Age: 64
End: 2019-02-28
Payer: COMMERCIAL

## 2019-02-28 DIAGNOSIS — Z87.39 H/O ROTATOR CUFF TEAR: ICD-10-CM

## 2019-02-28 LAB
ALBUMIN SERPL-MCNC: 4.01 G/DL (ref 3.1–4.5)
ALBUMIN/GLOB SERPL: 1.44 {RATIO}
ALPHA1 GLOB SERPL ELPH-MCNC: 0.2 G/DL (ref 0.1–0.3)
ALPHA2 GLOB SERPL ELPH-MCNC: 1.12 G/DL (ref 0.6–1)
ATRIAL RATE: 91 BPM
B-GLOBULIN SERPL ELPH-MCNC: 0.8 G/DL (ref 0.7–1.2)
GAMMA GLOB SERPL ELPH-MCNC: 0.67 G/DL (ref 0.6–1.6)
KAPPA FREE LIGHT CHAIN: 1.36 MG/DL (ref 0.33–1.94)
KAPPA/LAMBDA FLC RATIO: 0.99 (ref 0.26–1.65)
LAMBDA FREE LIGHT CHAIN: 1.38 MG/DL (ref 0.57–2.63)
MAI PROTEIN SERPL-MCNC: 6.8 G/DL (ref 6.4–8.2)
P AXIS: 44 DEGREES
P-R INTERVAL: 158 MS
Q-T INTERVAL: 364 MS
QRS DURATION: 88 MS
QTC CALCULATION (BEZET): 447 MS
R AXIS: 12 DEGREES
T AXIS: 54 DEGREES
VENTRICULAR RATE: 91 BPM

## 2019-02-28 PROCEDURE — 93005 ELECTROCARDIOGRAM TRACING: CPT

## 2019-02-28 PROCEDURE — 93010 ELECTROCARDIOGRAM REPORT: CPT | Performed by: INTERNAL MEDICINE

## 2019-03-03 ENCOUNTER — ANESTHESIA EVENT (OUTPATIENT)
Dept: MRI IMAGING | Facility: HOSPITAL | Age: 64
End: 2019-03-03

## 2019-03-04 ENCOUNTER — HOSPITAL ENCOUNTER (OUTPATIENT)
Dept: MRI IMAGING | Facility: HOSPITAL | Age: 64
Discharge: HOME | End: 2019-03-04
Attending: ORTHOPAEDIC SURGERY
Payer: COMMERCIAL

## 2019-03-04 ENCOUNTER — LAB ENCOUNTER (OUTPATIENT)
Dept: LAB | Facility: HOSPITAL | Age: 64
End: 2019-03-04
Attending: ORTHOPAEDIC SURGERY
Payer: COMMERCIAL

## 2019-03-04 ENCOUNTER — ANESTHESIA (OUTPATIENT)
Dept: MRI IMAGING | Facility: HOSPITAL | Age: 64
End: 2019-03-04

## 2019-03-04 ENCOUNTER — TELEPHONE (OUTPATIENT)
Dept: NEUROLOGY | Facility: CLINIC | Age: 64
End: 2019-03-04

## 2019-03-04 VITALS
HEIGHT: 63 IN | RESPIRATION RATE: 18 BRPM | SYSTOLIC BLOOD PRESSURE: 135 MMHG | BODY MASS INDEX: 42.88 KG/M2 | DIASTOLIC BLOOD PRESSURE: 98 MMHG | TEMPERATURE: 99 F | HEART RATE: 96 BPM | OXYGEN SATURATION: 93 % | WEIGHT: 242 LBS

## 2019-03-04 DIAGNOSIS — Z87.39 H/O ROTATOR CUFF TEAR: Primary | ICD-10-CM

## 2019-03-04 DIAGNOSIS — M75.101 ROTATOR CUFF SYNDROME OF RIGHT SHOULDER: ICD-10-CM

## 2019-03-04 DIAGNOSIS — M79.2 NEURITIS: ICD-10-CM

## 2019-03-04 DIAGNOSIS — E11.9 DIABETES MELLITUS WITHOUT COMPLICATION (HCC): ICD-10-CM

## 2019-03-04 DIAGNOSIS — M79.2 NEURALGIA: ICD-10-CM

## 2019-03-04 DIAGNOSIS — I10 ESSENTIAL HYPERTENSION, MALIGNANT: ICD-10-CM

## 2019-03-04 DIAGNOSIS — G89.29 OTHER CHRONIC PAIN: Primary | ICD-10-CM

## 2019-03-04 DIAGNOSIS — M75.102 ROTATOR CUFF SYNDROME OF LEFT SHOULDER: ICD-10-CM

## 2019-03-04 LAB — GLUCOSE BLD-MCNC: 101 MG/DL (ref 70–99)

## 2019-03-04 PROCEDURE — 73221 MRI JOINT UPR EXTREM W/O DYE: CPT | Performed by: ORTHOPAEDIC SURGERY

## 2019-03-04 PROCEDURE — 82962 GLUCOSE BLOOD TEST: CPT

## 2019-03-04 RX ORDER — HYDROCODONE BITARTRATE AND ACETAMINOPHEN 5; 325 MG/1; MG/1
2 TABLET ORAL AS NEEDED
Status: DISCONTINUED | OUTPATIENT
Start: 2019-03-04 | End: 2019-03-06

## 2019-03-04 RX ORDER — HYDROMORPHONE HYDROCHLORIDE 1 MG/ML
0.4 INJECTION, SOLUTION INTRAMUSCULAR; INTRAVENOUS; SUBCUTANEOUS EVERY 5 MIN PRN
Status: ACTIVE | OUTPATIENT
Start: 2019-03-04 | End: 2019-03-04

## 2019-03-04 RX ORDER — ONDANSETRON 2 MG/ML
4 INJECTION INTRAMUSCULAR; INTRAVENOUS AS NEEDED
Status: ACTIVE | OUTPATIENT
Start: 2019-03-04 | End: 2019-03-04

## 2019-03-04 RX ORDER — METOCLOPRAMIDE HYDROCHLORIDE 5 MG/ML
10 INJECTION INTRAMUSCULAR; INTRAVENOUS AS NEEDED
Status: ACTIVE | OUTPATIENT
Start: 2019-03-04 | End: 2019-03-04

## 2019-03-04 RX ORDER — SODIUM CHLORIDE, SODIUM LACTATE, POTASSIUM CHLORIDE, CALCIUM CHLORIDE 600; 310; 30; 20 MG/100ML; MG/100ML; MG/100ML; MG/100ML
INJECTION, SOLUTION INTRAVENOUS CONTINUOUS
Status: DISCONTINUED | OUTPATIENT
Start: 2019-03-04 | End: 2019-03-06

## 2019-03-04 RX ORDER — HYDROCODONE BITARTRATE AND ACETAMINOPHEN 5; 325 MG/1; MG/1
1 TABLET ORAL AS NEEDED
Status: DISCONTINUED | OUTPATIENT
Start: 2019-03-04 | End: 2019-03-06

## 2019-03-04 RX ORDER — NALOXONE HYDROCHLORIDE 0.4 MG/ML
80 INJECTION, SOLUTION INTRAMUSCULAR; INTRAVENOUS; SUBCUTANEOUS AS NEEDED
Status: ACTIVE | OUTPATIENT
Start: 2019-03-04 | End: 2019-03-04

## 2019-03-04 RX ORDER — DIPHENHYDRAMINE HYDROCHLORIDE 50 MG/ML
12.5 INJECTION INTRAMUSCULAR; INTRAVENOUS AS NEEDED
Status: ACTIVE | OUTPATIENT
Start: 2019-03-04 | End: 2019-03-04

## 2019-03-04 RX ORDER — MIDAZOLAM HYDROCHLORIDE 1 MG/ML
1 INJECTION INTRAMUSCULAR; INTRAVENOUS EVERY 5 MIN PRN
Status: ACTIVE | OUTPATIENT
Start: 2019-03-04 | End: 2019-03-04

## 2019-03-04 RX ORDER — MEPERIDINE HYDROCHLORIDE 25 MG/ML
12.5 INJECTION INTRAMUSCULAR; INTRAVENOUS; SUBCUTANEOUS AS NEEDED
Status: DISCONTINUED | OUTPATIENT
Start: 2019-03-04 | End: 2019-03-06

## 2019-03-04 NOTE — ANESTHESIA PREPROCEDURE EVALUATION
PRE-OP EVALUATION    Patient Name: Mykel Hope    Pre-op Diagnosis: * No surgery found *    * No surgery found *    * Surgery not found *    Pre-op vitals reviewed. Body mass index is 42.87 kg/m². Current medications reviewed.   Landy Holguin Complications  (+) history of anesthetic complications  History of: PONV       GI/Hepatic/Renal      (+) GERD                           Cardiovascular                (+) obesity  (+) hypertension   (+) hyperlipidemia                                  Endo/O knee   • OTHER SURGICAL HISTORY  10/2004    extraction of all her teeth   • OTHER SURGICAL HISTORY  08/2003 & 8/2013    open abdominal wound repair   • REMOVAL GALLBLADDER     • SACROILIAC JOINT INJECTION BILATERAL Bilateral 4/26/2018    Performed by Asim Wynn,

## 2019-03-04 NOTE — TELEPHONE ENCOUNTER
[employer] message sent to patient with the below results. ----- Message from Jd Maldonado MD sent at 2/26/2019  9:42 AM CST -----  Labs reviewed normal, inform patient or POA.

## 2019-03-04 NOTE — ANESTHESIA POSTPROCEDURE EVALUATION
Lanette 49 Patient Status:  Outpatient   Age/Gender 61year old female MRN [id number]   Location [address] [place] [facility] Attending Gina Valentin MD   Hosp Day # 0 PCP Aleksandra Lujan MD       Anesthesia Post-op Note    * No procedures l

## 2019-05-30 ENCOUNTER — HOSPITAL ENCOUNTER (OUTPATIENT)
Dept: CT IMAGING | Age: 64
Discharge: HOME | End: 2019-05-30
Attending: INTERNAL MEDICINE
Payer: COMMERCIAL

## 2019-05-30 DIAGNOSIS — R10.9 FLANK PAIN: ICD-10-CM

## 2019-05-30 PROCEDURE — 74176 CT ABD & PELVIS W/O CONTRAST: CPT | Performed by: INTERNAL MEDICINE

## 2019-09-20 RX ORDER — SODIUM CHLORIDE, SODIUM LACTATE, POTASSIUM CHLORIDE, CALCIUM CHLORIDE 600; 310; 30; 20 MG/100ML; MG/100ML; MG/100ML; MG/100ML
INJECTION, SOLUTION INTRAVENOUS CONTINUOUS
Status: CANCELLED | OUTPATIENT
Start: 2019-09-20

## 2019-10-01 ENCOUNTER — LAB ENCOUNTER (OUTPATIENT)
Dept: LAB | Age: 64
End: 2019-10-01
Attending: INTERNAL MEDICINE
Payer: COMMERCIAL

## 2019-10-01 DIAGNOSIS — R19.7 DIARRHEA, UNSPECIFIED TYPE: ICD-10-CM

## 2019-10-01 DIAGNOSIS — M79.2 NEURALGIA: ICD-10-CM

## 2019-10-01 DIAGNOSIS — M79.2 NEURITIS: ICD-10-CM

## 2019-10-01 DIAGNOSIS — E11.9 DIABETES MELLITUS WITHOUT COMPLICATION (HCC): ICD-10-CM

## 2019-10-01 DIAGNOSIS — I10 ESSENTIAL HYPERTENSION, MALIGNANT: ICD-10-CM

## 2019-10-01 DIAGNOSIS — G89.29 OTHER CHRONIC PAIN: ICD-10-CM

## 2019-10-01 PROCEDURE — 83735 ASSAY OF MAGNESIUM: CPT

## 2019-10-01 PROCEDURE — 80053 COMPREHEN METABOLIC PANEL: CPT

## 2019-10-01 PROCEDURE — 85025 COMPLETE CBC W/AUTO DIFF WBC: CPT

## 2019-10-01 PROCEDURE — 80051 ELECTROLYTE PANEL: CPT

## 2019-10-01 PROCEDURE — 36415 COLL VENOUS BLD VENIPUNCTURE: CPT

## 2019-10-08 ENCOUNTER — HOSPITAL ENCOUNTER (OUTPATIENT)
Facility: HOSPITAL | Age: 64
Setting detail: HOSPITAL OUTPATIENT SURGERY
Discharge: HOME | End: 2019-10-08
Attending: INTERNAL MEDICINE | Admitting: INTERNAL MEDICINE
Payer: COMMERCIAL

## 2019-10-08 ENCOUNTER — ANESTHESIA EVENT (OUTPATIENT)
Dept: ENDOSCOPY | Facility: HOSPITAL | Age: 64
End: 2019-10-08
Payer: COMMERCIAL

## 2019-10-08 ENCOUNTER — ANESTHESIA (OUTPATIENT)
Dept: ENDOSCOPY | Facility: HOSPITAL | Age: 64
End: 2019-10-08
Payer: COMMERCIAL

## 2019-10-08 VITALS
RESPIRATION RATE: 20 BRPM | HEIGHT: 64 IN | WEIGHT: 238 LBS | BODY MASS INDEX: 40.63 KG/M2 | TEMPERATURE: 99 F | OXYGEN SATURATION: 99 % | HEART RATE: 70 BPM | SYSTOLIC BLOOD PRESSURE: 130 MMHG | DIASTOLIC BLOOD PRESSURE: 61 MMHG

## 2019-10-08 DIAGNOSIS — R10.13 EPIGASTRIC PAIN: ICD-10-CM

## 2019-10-08 DIAGNOSIS — R10.84 GENERALIZED ABDOMINAL PAIN: ICD-10-CM

## 2019-10-08 DIAGNOSIS — R14.0 BLOATING: ICD-10-CM

## 2019-10-08 DIAGNOSIS — R19.7 DIARRHEA, UNSPECIFIED TYPE: Primary | ICD-10-CM

## 2019-10-08 PROCEDURE — 88305 TISSUE EXAM BY PATHOLOGIST: CPT | Performed by: INTERNAL MEDICINE

## 2019-10-08 PROCEDURE — 0DB68ZX EXCISION OF STOMACH, VIA NATURAL OR ARTIFICIAL OPENING ENDOSCOPIC, DIAGNOSTIC: ICD-10-PCS | Performed by: INTERNAL MEDICINE

## 2019-10-08 PROCEDURE — 82962 GLUCOSE BLOOD TEST: CPT

## 2019-10-08 PROCEDURE — 0DB88ZX EXCISION OF SMALL INTESTINE, VIA NATURAL OR ARTIFICIAL OPENING ENDOSCOPIC, DIAGNOSTIC: ICD-10-PCS | Performed by: INTERNAL MEDICINE

## 2019-10-08 RX ORDER — MEPERIDINE HYDROCHLORIDE 25 MG/ML
12.5 INJECTION INTRAMUSCULAR; INTRAVENOUS; SUBCUTANEOUS AS NEEDED
Status: DISCONTINUED | OUTPATIENT
Start: 2019-10-08 | End: 2019-10-08

## 2019-10-08 RX ORDER — ONDANSETRON 2 MG/ML
4 INJECTION INTRAMUSCULAR; INTRAVENOUS AS NEEDED
Status: DISCONTINUED | OUTPATIENT
Start: 2019-10-08 | End: 2019-10-08

## 2019-10-08 RX ORDER — DEXTROSE MONOHYDRATE 25 G/50ML
50 INJECTION, SOLUTION INTRAVENOUS
Status: DISCONTINUED | OUTPATIENT
Start: 2019-10-08 | End: 2019-10-08

## 2019-10-08 RX ORDER — SODIUM CHLORIDE, SODIUM LACTATE, POTASSIUM CHLORIDE, CALCIUM CHLORIDE 600; 310; 30; 20 MG/100ML; MG/100ML; MG/100ML; MG/100ML
INJECTION, SOLUTION INTRAVENOUS CONTINUOUS
Status: DISCONTINUED | OUTPATIENT
Start: 2019-10-08 | End: 2019-10-08

## 2019-10-08 RX ORDER — NALOXONE HYDROCHLORIDE 0.4 MG/ML
80 INJECTION, SOLUTION INTRAMUSCULAR; INTRAVENOUS; SUBCUTANEOUS AS NEEDED
Status: DISCONTINUED | OUTPATIENT
Start: 2019-10-08 | End: 2019-10-08

## 2019-10-08 RX ORDER — METOCLOPRAMIDE HYDROCHLORIDE 5 MG/ML
10 INJECTION INTRAMUSCULAR; INTRAVENOUS AS NEEDED
Status: DISCONTINUED | OUTPATIENT
Start: 2019-10-08 | End: 2019-10-08

## 2019-10-08 RX ORDER — DIPHENHYDRAMINE HYDROCHLORIDE 50 MG/ML
12.5 INJECTION INTRAMUSCULAR; INTRAVENOUS AS NEEDED
Status: DISCONTINUED | OUTPATIENT
Start: 2019-10-08 | End: 2019-10-08

## 2019-10-08 NOTE — H&P
[address] Patient Status:  Hospital Outpatient Surgery    1955 MRN [id number]   [facility] ENDOSCOPY Attending Rosangela Urbano MD   Date 10/8/2019 PCP Melinda Rae MD     CC: H/o gastric bypa 6/17/2014    Performed by Arvind Carmona MD at [facility]   • LUMBAR FACET INJECTION Right 5/10/2018    Performed by Cyndi Lancaster MD at [address] [place] [address]   • LUMBAR FACET INJECTION Left 5/3/2018    Performed by Cyndi Lancaster MD at [address] [place] [address] Oral, Q15 Min PRN **OR** Glucose-Vitamin C (DEX-4) chewable tab 8 tablet, 8 tablet, Oral, Q15 Min PRN    Physical Exam:    Blood pressure 144/68, pulse 68, temperature 98.5 °F (36.9 °C), temperature source Oral, resp.  rate 16, height 64\", weight 238 lb (1

## 2019-10-08 NOTE — OPERATIVE REPORT
Yifan Johnson Patient Status:  Hospital Outpatient Surgery    1955 MRN [id number]   [facility] ENDOSCOPY Attending Nikhil Dixon MD   Date 10/8/2019 PCP Lennox Ahr, MD     PREOPERATIVE DIAGNOSIS/INDICATION: H/o gastric bypass

## 2019-10-08 NOTE — ANESTHESIA PREPROCEDURE EVALUATION
PRE-OP EVALUATION    Patient Name: Genoveva Addison    Pre-op Diagnosis: Diarrhea, unspecified type [R19.7]  Epigastric pain [R10.13]  Bloating [R14.0]  Generalized abdominal pain [R10.84]    Procedure(s):  ESOPHAGOGASTRODUODENOSCOPY    Surgeon(s) and Role: Tab Take 25 mg by mouth daily. Disp:  Rfl:    Oxybutynin Chloride (DITROPAN) 5 MG Oral Tab Take 5 mg by mouth daily.  Disp:  Rfl:    Pantoprazole Sodium (PROTONIX) 40 MG Oral Tab EC Take 40 mg by mouth every morning before breakfast. Disp:  Rfl:        Heber 1/9957,6/5010,1/4460,   • CARPAL TUNNEL RELEASE Bilateral 11/95, 1/96, 03/    bilateral x 2    • CATARACT Bilateral    • CHOLECYSTECTOMY  10/1980    open   • COLECTOMY  06/2004 & 11/2004   • COLONOSCOPY     • COLONOSCOPY N/A 8/10/2017    Performed b reviewed.   Lab Results   Component Value Date    WBC 5.9 10/01/2019    RBC 4.35 10/01/2019    HGB 10.5 (L) 10/01/2019    HCT 33.6 (L) 10/01/2019    MCV 77.2 (L) 10/01/2019    MCH 24.1 (L) 10/01/2019    MCHC 31.3 10/01/2019    RDW 17.3 (H) 10/01/2019    PLT

## 2019-10-08 NOTE — ANESTHESIA POSTPROCEDURE EVALUATION
[address] Patient Status:  Hospital Outpatient Surgery   Age/Gender 61year old female MRN [id number]   Location [address] [place] [address]. Attending Xander Pettit MD   Hosp Day # 0 PCP India Hernandez MD       Anesthesia Post-op N

## 2019-10-10 NOTE — PROGRESS NOTES
Date: 10/10/2019    To: Esequiel Villa  : 1955    I hope this letter finds you doing well. I am writing to inform you of the following:      The biopsies obtained at the time of your recent endoscopic procedure were benign and showed no evidence of

## 2019-11-06 ENCOUNTER — APPOINTMENT (OUTPATIENT)
Dept: LAB | Age: 64
End: 2019-11-06
Attending: NURSE PRACTITIONER
Payer: COMMERCIAL

## 2019-11-06 DIAGNOSIS — R19.7 DIARRHEA, UNSPECIFIED TYPE: ICD-10-CM

## 2019-11-06 PROCEDURE — 87493 C DIFF AMPLIFIED PROBE: CPT

## 2019-11-06 PROCEDURE — 83993 ASSAY FOR CALPROTECTIN FECAL: CPT

## 2019-11-18 RX ORDER — SODIUM CHLORIDE, SODIUM LACTATE, POTASSIUM CHLORIDE, CALCIUM CHLORIDE 600; 310; 30; 20 MG/100ML; MG/100ML; MG/100ML; MG/100ML
INJECTION, SOLUTION INTRAVENOUS CONTINUOUS
Status: CANCELLED | OUTPATIENT
Start: 2019-11-18

## 2019-11-26 ENCOUNTER — HOSPITAL ENCOUNTER (OUTPATIENT)
Facility: HOSPITAL | Age: 64
Setting detail: HOSPITAL OUTPATIENT SURGERY
Discharge: HOME | End: 2019-11-26
Attending: INTERNAL MEDICINE | Admitting: INTERNAL MEDICINE
Payer: COMMERCIAL

## 2019-11-26 ENCOUNTER — ANESTHESIA EVENT (OUTPATIENT)
Dept: ENDOSCOPY | Facility: HOSPITAL | Age: 64
End: 2019-11-26
Payer: COMMERCIAL

## 2019-11-26 ENCOUNTER — ANESTHESIA (OUTPATIENT)
Dept: ENDOSCOPY | Facility: HOSPITAL | Age: 64
End: 2019-11-26
Payer: COMMERCIAL

## 2019-11-26 VITALS
RESPIRATION RATE: 16 BRPM | DIASTOLIC BLOOD PRESSURE: 65 MMHG | HEIGHT: 64 IN | TEMPERATURE: 99 F | WEIGHT: 212 LBS | HEART RATE: 72 BPM | OXYGEN SATURATION: 98 % | BODY MASS INDEX: 36.19 KG/M2 | SYSTOLIC BLOOD PRESSURE: 151 MMHG

## 2019-11-26 DIAGNOSIS — R10.13 EPIGASTRIC PAIN: ICD-10-CM

## 2019-11-26 DIAGNOSIS — R14.0 BLOATING: ICD-10-CM

## 2019-11-26 PROCEDURE — 0DBE8ZX EXCISION OF LARGE INTESTINE, VIA NATURAL OR ARTIFICIAL OPENING ENDOSCOPIC, DIAGNOSTIC: ICD-10-PCS | Performed by: INTERNAL MEDICINE

## 2019-11-26 PROCEDURE — 88305 TISSUE EXAM BY PATHOLOGIST: CPT | Performed by: INTERNAL MEDICINE

## 2019-11-26 RX ORDER — HYDROMORPHONE HYDROCHLORIDE 1 MG/ML
0.4 INJECTION, SOLUTION INTRAMUSCULAR; INTRAVENOUS; SUBCUTANEOUS EVERY 5 MIN PRN
Status: DISCONTINUED | OUTPATIENT
Start: 2019-11-26 | End: 2019-11-26

## 2019-11-26 RX ORDER — SODIUM CHLORIDE, SODIUM LACTATE, POTASSIUM CHLORIDE, CALCIUM CHLORIDE 600; 310; 30; 20 MG/100ML; MG/100ML; MG/100ML; MG/100ML
INJECTION, SOLUTION INTRAVENOUS CONTINUOUS
Status: DISCONTINUED | OUTPATIENT
Start: 2019-11-26 | End: 2019-11-26

## 2019-11-26 RX ORDER — HYDROCODONE BITARTRATE AND ACETAMINOPHEN 10; 325 MG/1; MG/1
1 TABLET ORAL AS NEEDED
Status: DISCONTINUED | OUTPATIENT
Start: 2019-11-26 | End: 2019-11-26

## 2019-11-26 RX ORDER — NALOXONE HYDROCHLORIDE 0.4 MG/ML
80 INJECTION, SOLUTION INTRAMUSCULAR; INTRAVENOUS; SUBCUTANEOUS AS NEEDED
Status: DISCONTINUED | OUTPATIENT
Start: 2019-11-26 | End: 2019-11-26

## 2019-11-26 RX ORDER — HYDROCODONE BITARTRATE AND ACETAMINOPHEN 10; 325 MG/1; MG/1
2 TABLET ORAL AS NEEDED
Status: DISCONTINUED | OUTPATIENT
Start: 2019-11-26 | End: 2019-11-26

## 2019-11-26 RX ORDER — METOCLOPRAMIDE HYDROCHLORIDE 5 MG/ML
10 INJECTION INTRAMUSCULAR; INTRAVENOUS AS NEEDED
Status: DISCONTINUED | OUTPATIENT
Start: 2019-11-26 | End: 2019-11-26

## 2019-11-26 RX ORDER — ONDANSETRON 2 MG/ML
4 INJECTION INTRAMUSCULAR; INTRAVENOUS AS NEEDED
Status: DISCONTINUED | OUTPATIENT
Start: 2019-11-26 | End: 2019-11-26

## 2019-11-26 RX ORDER — DEXTROSE MONOHYDRATE 25 G/50ML
50 INJECTION, SOLUTION INTRAVENOUS
Status: DISCONTINUED | OUTPATIENT
Start: 2019-11-26 | End: 2019-11-26

## 2019-11-26 RX ORDER — LIDOCAINE HYDROCHLORIDE 10 MG/ML
INJECTION, SOLUTION EPIDURAL; INFILTRATION; INTRACAUDAL; PERINEURAL AS NEEDED
Status: DISCONTINUED | OUTPATIENT
Start: 2019-11-26 | End: 2019-11-26 | Stop reason: SURG

## 2019-11-26 RX ORDER — HYOSCYAMINE SULFATE 0.125 MG
125 TABLET ORAL EVERY 6 HOURS PRN
Qty: 90 TABLET | Refills: 1 | Status: SHIPPED | OUTPATIENT
Start: 2019-11-26 | End: 2020-01-06

## 2019-11-26 RX ADMIN — SODIUM CHLORIDE, SODIUM LACTATE, POTASSIUM CHLORIDE, CALCIUM CHLORIDE: 600; 310; 30; 20 INJECTION, SOLUTION INTRAVENOUS at 14:45:00

## 2019-11-26 RX ADMIN — LIDOCAINE HYDROCHLORIDE 25 MG: 10 INJECTION, SOLUTION EPIDURAL; INFILTRATION; INTRACAUDAL; PERINEURAL at 14:25:00

## 2019-11-26 NOTE — ANESTHESIA POSTPROCEDURE EVALUATION
[address] Patient Status:  Hospital Outpatient Surgery   Age/Gender 61year old female MRN [id number]   Location [address] [place] [address]. Attending Shannon Quiñonez MD   Hosp Day # 0 PCP Kinjal Landa MD       Anesthesia Post-op N

## 2019-11-26 NOTE — H&P
[address] Patient Status:  Hospital Outpatient Surgery    1955 MRN [id number]   [facility] ENDOSCOPY Attending Nikolas Gaspar MD   Date 2019 PCP Jose Francisco Minor MD     CC: Positive fecal Performed by David Aldridge MD at [facility] MAIN OR   • HERNIA SURGERY  06/2003    ventral & abdominoplasty   • INTRAOPERATIVE NEURO MONITORING N/A 6/17/2014    Performed by Tita Mcrae MD at [facility]   • LUMBAR FACET INJECTION Right 5/10/2018    P tablet, Oral, Q15 Min PRN **OR** dextrose 50 % injection 50 mL, 50 mL, Intravenous, Q15 Min PRN **OR** glucose (DEX4) oral liquid 30 g, 30 g, Oral, Q15 Min PRN **OR** Glucose-Vitamin C (DEX-4) chewable tab 8 tablet, 8 tablet, Oral, Q15 Min PRN    Physical

## 2019-11-26 NOTE — ANESTHESIA PREPROCEDURE EVALUATION
PRE-OP EVALUATION    Patient Name: Afshan Coley    Pre-op Diagnosis: Epigastric pain [R10.13]  Bloating [R14.0]    Procedure(s): FLEXIBLE SIGMOIDOSCOPY    Surgeon(s) and Role:     Arvind Buckley MD - Primary    Pre-op vitals reviewed.         Body m Evaluation    Patient summary reviewed.     Anesthetic Complications  (+) history of anesthetic complications         GI/Hepatic/Renal                                 Cardiovascular                  (+) hypertension   (+) hyperlipidemia Performed by Tisha Reyes MD at [facility]   • OTHER SURGICAL HISTORY  06/2009    removal cement piece right knee   • OTHER SURGICAL HISTORY  10/2004    extraction of all her teeth   • OTHER SURGICAL HISTORY  08/2003 & 8/2013    open abdominal wound

## 2019-11-26 NOTE — OPERATIVE REPORT
Giacomo Yoo Patient Status:  Hospital Outpatient Surgery    1955 MRN [id number]   [facility] ENDOSCOPY Attending Pedro Talavera MD   Date 2019 PCP Chas Lowe MD     PREOPERATIVE DIAGNOSIS/INDICATION: Chronic diarrhea,

## 2019-12-04 NOTE — PROGRESS NOTES
Date: 12/3/2019    To: Glenny Gregory  : 1955    I hope this letter finds you doing well. I am writing to inform you of the following:      The biopsies obtained at the time of your recent endoscopic procedure were benign and showed no evidence of i

## 2020-01-06 RX ORDER — HYOSCYAMINE SULFATE 0.125 MG
TABLET ORAL
Qty: 90 TABLET | Refills: 1 | Status: SHIPPED | OUTPATIENT
Start: 2020-01-06 | End: 2020-02-05 | Stop reason: ALTCHOICE

## 2020-01-06 NOTE — TELEPHONE ENCOUNTER
Prescribed on 11/26/19 #90 with 1 refill. Limited colonoscopy on 11/26/19:  Normal limited colonoscopy to ascending colon. OV with SIERRA Rendon on 9/16/19. Patient has not scheduled f/u OV that was recommended at this OV.   She has completed all testin

## 2020-02-05 RX ORDER — SODIUM CHLORIDE, SODIUM LACTATE, POTASSIUM CHLORIDE, CALCIUM CHLORIDE 600; 310; 30; 20 MG/100ML; MG/100ML; MG/100ML; MG/100ML
INJECTION, SOLUTION INTRAVENOUS CONTINUOUS
Status: CANCELLED | OUTPATIENT
Start: 2020-02-05

## 2020-02-05 RX ORDER — DEXTROSE MONOHYDRATE 25 G/50ML
50 INJECTION, SOLUTION INTRAVENOUS
Status: CANCELLED | OUTPATIENT
Start: 2020-02-05

## 2020-02-07 ENCOUNTER — APPOINTMENT (OUTPATIENT)
Dept: LAB | Age: 65
End: 2020-02-07
Attending: INTERNAL MEDICINE
Payer: COMMERCIAL

## 2020-02-07 DIAGNOSIS — K28.9 ANASTOMOTIC ULCER: ICD-10-CM

## 2020-02-07 LAB
CHLORIDE SERPL-SCNC: 105 MMOL/L (ref 98–112)
CO2 SERPL-SCNC: 28 MMOL/L (ref 21–32)
POTASSIUM SERPL-SCNC: 3.9 MMOL/L (ref 3.5–5.1)
SODIUM SERPL-SCNC: 137 MMOL/L (ref 136–145)

## 2020-02-07 PROCEDURE — 36415 COLL VENOUS BLD VENIPUNCTURE: CPT

## 2020-02-07 PROCEDURE — 80051 ELECTROLYTE PANEL: CPT

## 2020-02-13 ENCOUNTER — HOSPITAL ENCOUNTER (OUTPATIENT)
Facility: HOSPITAL | Age: 65
Setting detail: HOSPITAL OUTPATIENT SURGERY
Discharge: HOME | End: 2020-02-13
Attending: INTERNAL MEDICINE | Admitting: INTERNAL MEDICINE
Payer: COMMERCIAL

## 2020-02-13 ENCOUNTER — ANESTHESIA (OUTPATIENT)
Dept: ENDOSCOPY | Facility: HOSPITAL | Age: 65
End: 2020-02-13
Payer: COMMERCIAL

## 2020-02-13 ENCOUNTER — ANESTHESIA EVENT (OUTPATIENT)
Dept: ENDOSCOPY | Facility: HOSPITAL | Age: 65
End: 2020-02-13
Payer: COMMERCIAL

## 2020-02-13 VITALS
RESPIRATION RATE: 18 BRPM | BODY MASS INDEX: 40.63 KG/M2 | HEART RATE: 64 BPM | SYSTOLIC BLOOD PRESSURE: 112 MMHG | WEIGHT: 238 LBS | HEIGHT: 64 IN | TEMPERATURE: 98 F | DIASTOLIC BLOOD PRESSURE: 56 MMHG | OXYGEN SATURATION: 97 %

## 2020-02-13 DIAGNOSIS — R10.32 LEFT LOWER QUADRANT ABDOMINAL PAIN: ICD-10-CM

## 2020-02-13 DIAGNOSIS — K28.9 ANASTOMOTIC ULCER: Primary | ICD-10-CM

## 2020-02-13 DIAGNOSIS — R10.12 LEFT UPPER QUADRANT PAIN: ICD-10-CM

## 2020-02-13 LAB — GLUCOSE BLD-MCNC: 113 MG/DL (ref 70–99)

## 2020-02-13 PROCEDURE — 0DJ08ZZ INSPECTION OF UPPER INTESTINAL TRACT, VIA NATURAL OR ARTIFICIAL OPENING ENDOSCOPIC: ICD-10-PCS | Performed by: INTERNAL MEDICINE

## 2020-02-13 PROCEDURE — 82962 GLUCOSE BLOOD TEST: CPT

## 2020-02-13 RX ORDER — DEXTROSE MONOHYDRATE 25 G/50ML
50 INJECTION, SOLUTION INTRAVENOUS
Status: DISCONTINUED | OUTPATIENT
Start: 2020-02-13 | End: 2020-02-13

## 2020-02-13 RX ORDER — LIDOCAINE HYDROCHLORIDE 10 MG/ML
INJECTION, SOLUTION EPIDURAL; INFILTRATION; INTRACAUDAL; PERINEURAL AS NEEDED
Status: DISCONTINUED | OUTPATIENT
Start: 2020-02-13 | End: 2020-02-13 | Stop reason: SURG

## 2020-02-13 RX ORDER — LIDOCAINE HYDROCHLORIDE 10 MG/ML
INJECTION, SOLUTION EPIDURAL; INFILTRATION; INTRACAUDAL; PERINEURAL AS NEEDED
Status: DISCONTINUED | OUTPATIENT
Start: 2020-02-13 | End: 2020-02-13

## 2020-02-13 RX ORDER — ONDANSETRON 2 MG/ML
4 INJECTION INTRAMUSCULAR; INTRAVENOUS AS NEEDED
Status: DISCONTINUED | OUTPATIENT
Start: 2020-02-13 | End: 2020-02-13

## 2020-02-13 RX ORDER — SODIUM CHLORIDE, SODIUM LACTATE, POTASSIUM CHLORIDE, CALCIUM CHLORIDE 600; 310; 30; 20 MG/100ML; MG/100ML; MG/100ML; MG/100ML
INJECTION, SOLUTION INTRAVENOUS CONTINUOUS
Status: DISCONTINUED | OUTPATIENT
Start: 2020-02-13 | End: 2020-02-13

## 2020-02-13 RX ORDER — NALOXONE HYDROCHLORIDE 0.4 MG/ML
80 INJECTION, SOLUTION INTRAMUSCULAR; INTRAVENOUS; SUBCUTANEOUS AS NEEDED
Status: DISCONTINUED | OUTPATIENT
Start: 2020-02-13 | End: 2020-02-13

## 2020-02-13 RX ADMIN — SODIUM CHLORIDE, SODIUM LACTATE, POTASSIUM CHLORIDE, CALCIUM CHLORIDE: 600; 310; 30; 20 INJECTION, SOLUTION INTRAVENOUS at 07:57:00

## 2020-02-13 RX ADMIN — LIDOCAINE HYDROCHLORIDE 30 MG: 10 INJECTION, SOLUTION EPIDURAL; INFILTRATION; INTRACAUDAL; PERINEURAL at 08:01:00

## 2020-02-13 RX ADMIN — SODIUM CHLORIDE, SODIUM LACTATE, POTASSIUM CHLORIDE, CALCIUM CHLORIDE: 600; 310; 30; 20 INJECTION, SOLUTION INTRAVENOUS at 08:12:00

## 2020-02-13 NOTE — ANESTHESIA POSTPROCEDURE EVALUATION
Lanette 49 Patient Status:  Hospital Outpatient Surgery   Age/Gender 59year old female MRN [id number]   Location [facility]. Attending Miller Fuentes MD   Hosp Day # 0 PCP Andreas Mckeon MD       Anesthesia Post-op N

## 2020-02-13 NOTE — ANESTHESIA PREPROCEDURE EVALUATION
PRE-OP EVALUATION    Patient Name: Tracey Bacon    Pre-op Diagnosis: Anastomotic ulcer [K28.9]  Left upper quadrant pain [R10.12]  Left lower quadrant abdominal pain [R10.32]    Procedure(s):  ESOPHAGOGASTRODUODENOSCOPY    Surgeon(s) and Role:     Korina Molina mouth 2 (two) times daily. , Disp: , Rfl:   Losartan Potassium (COZAAR) 25 MG Oral Tab, Take 25 mg by mouth daily. , Disp: , Rfl:   Oxybutynin Chloride (DITROPAN) 5 MG Oral Tab, Take 5 mg by mouth daily. , Disp: , Rfl:   Pantoprazole Sodium (PROTONIX) 40 MG UNLISTED Left 2003    knee x 2   • BACK SURGERY      LUMBAR SURGERY/HARDWARE/RODS   •   1980,1983,1984,   • CARPAL TUNNEL RELEASE Bilateral , , -    bilateral x 2    • CATARACT Bilateral    • CHOLECYSTECTOMY  10/1980 LIGATION  9/1984   • UPPER GI ENDOSCOPY,EXAM  04/2006 & 07/2007     Social History    Tobacco Use      Smoking status: Former Smoker        Packs/day: 0.50        Years: 15.00        Pack years: 7.5        Quit date: 5/5/1984        Years since quitting: 3

## 2020-02-13 NOTE — OPERATIVE REPORT
Tyra Ilan Patient Status:  Hospital Outpatient Surgery    1955 MRN [id number]   [facility] ENDOSCOPY Attending Jose Miguel Hawley MD   Date 2020 PCP Marcela Gates MD     PREOPERATIVE DIAGNOSIS/INDICATION: h/o gastric bypass

## 2020-02-13 NOTE — H&P
[address] Patient Status:  Hospital Outpatient Surgery    1955 MRN [id number]   [facility] ENDOSCOPY Attending Aries Mills MD   Date 2020 PCP Vu Rodrigez MD     CC: Post gastric byp foot fx. repair   • FOOT SURGERY  09/2002    neuroma left foot   • GASTRIC BYPASS,OBESITY,SB RECONSTRUC  8/2000   • GENICULAR NERVE BLOCK Bilateral 4/19/2018    Performed by Jamarcus Boss MD at [facility] MAIN OR   • HERNIA SURGERY  06/2003    ventral & abdomi Migraine    Medications:    Current Facility-Administered Medications:   •  lactated ringers infusion, , Intravenous, Continuous  •  glucose (DEX4) oral liquid 15 g, 15 g, Oral, Q15 Min PRN **OR** Glucose-Vitamin C (DEX-4) chewable tab 4 tablet, 4 tablet, initial encounter [facility])      Post gastric bypass and anastomotic ulcer    Plan;  EGD    Christopher Diallo  2/13/2020  7:48 AM

## 2020-04-02 ENCOUNTER — HOSPITAL ENCOUNTER (OUTPATIENT)
Dept: CT IMAGING | Age: 65
Discharge: HOME | End: 2020-04-02
Attending: NURSE PRACTITIONER
Payer: COMMERCIAL

## 2020-04-02 DIAGNOSIS — R10.32 LEFT LOWER QUADRANT ABDOMINAL PAIN: ICD-10-CM

## 2020-04-02 DIAGNOSIS — R10.12 LEFT UPPER QUADRANT PAIN: ICD-10-CM

## 2020-04-02 PROCEDURE — 82565 ASSAY OF CREATININE: CPT

## 2020-04-02 PROCEDURE — 74177 CT ABD & PELVIS W/CONTRAST: CPT | Performed by: NURSE PRACTITIONER

## 2020-04-23 RX ORDER — HYOSCYAMINE SULFATE 0.125 MG
TABLET ORAL
Qty: 90 TABLET | Refills: 1 | Status: SHIPPED | OUTPATIENT
Start: 2020-04-23 | End: 2020-06-04

## 2020-04-23 NOTE — TELEPHONE ENCOUNTER
EGD on 2/13/2020: h/o gastric bypass and anastomotic ulcer, healed ulcer    OV with Neha Gonzalez NP on 1/26/2020:   IMPRESSION:      Left sided abdominal pain  Iron deficiency anemia  Nausea with rare emesis  SIBO positive-s/p Xifaxan x 2 weeks; now decreased bl

## 2020-06-04 NOTE — TELEPHONE ENCOUNTER
EGD on 2/13/2020: h/o gastric bypass and anastomotic ulcer, healed ulcer     OV with Emmy Began NP on 1/26/2020:   IMPRESSION:      Left sided abdominal pain  Iron deficiency anemia  Nausea with rare emesis  SIBO positive-s/p Xifaxan x 2 weeks; now decreased b

## 2020-06-05 RX ORDER — HYOSCYAMINE SULFATE 0.125 MG
TABLET ORAL
Qty: 90 TABLET | Refills: 3 | Status: SHIPPED | OUTPATIENT
Start: 2020-06-05 | End: 2020-09-15

## 2020-09-15 RX ORDER — HYOSCYAMINE SULFATE 0.125 MG
TABLET ORAL
Qty: 90 TABLET | Refills: 3 | Status: SHIPPED | OUTPATIENT
Start: 2020-09-15 | End: 2020-09-23

## 2020-09-15 NOTE — TELEPHONE ENCOUNTER
EGD on 2/13/2020: h/o gastric bypass and anastomotic ulcer, healed ulcer     OV with Lior Clayton NP on 1/26/2020:   IMPRESSION:      Left sided abdominal pain  Iron deficiency anemia  Nausea with rare emesis  SIBO positive-s/p Xifaxan x 2 weeks; now decreased b

## 2020-09-23 RX ORDER — HYOSCYAMINE SULFATE 0.125 MG
TABLET ORAL
Qty: 368 TABLET | Refills: 0 | Status: SHIPPED | OUTPATIENT
Start: 2020-09-23 | End: 2021-10-06 | Stop reason: ALTCHOICE

## 2021-01-05 ENCOUNTER — HOSPITAL ENCOUNTER (OUTPATIENT)
Dept: CV DIAGNOSTICS | Age: 66
Discharge: HOME | End: 2021-01-05
Attending: INTERNAL MEDICINE
Payer: MEDICARE

## 2021-01-05 DIAGNOSIS — R01.1 CARDIAC MURMUR: ICD-10-CM

## 2021-01-05 PROCEDURE — 93306 TTE W/DOPPLER COMPLETE: CPT | Performed by: INTERNAL MEDICINE

## 2021-01-27 ENCOUNTER — HOSPITAL ENCOUNTER (OUTPATIENT)
Dept: MAMMOGRAPHY | Age: 66
Discharge: HOME | End: 2021-01-27
Attending: INTERNAL MEDICINE
Payer: MEDICARE

## 2021-01-27 DIAGNOSIS — Z12.31 ENCOUNTER FOR SCREENING MAMMOGRAM FOR MALIGNANT NEOPLASM OF BREAST: ICD-10-CM

## 2021-01-27 PROCEDURE — 77063 BREAST TOMOSYNTHESIS BI: CPT | Performed by: INTERNAL MEDICINE

## 2021-01-27 PROCEDURE — 77067 SCR MAMMO BI INCL CAD: CPT | Performed by: INTERNAL MEDICINE

## 2021-02-12 ENCOUNTER — OFFICE VISIT (OUTPATIENT)
Dept: CARDIOLOGY | Age: 66
End: 2021-02-12

## 2021-02-12 VITALS
HEART RATE: 73 BPM | WEIGHT: 254 LBS | DIASTOLIC BLOOD PRESSURE: 62 MMHG | BODY MASS INDEX: 46.74 KG/M2 | SYSTOLIC BLOOD PRESSURE: 122 MMHG | HEIGHT: 62 IN

## 2021-02-12 DIAGNOSIS — R93.1 ABNORMAL ECHOCARDIOGRAM: ICD-10-CM

## 2021-02-12 DIAGNOSIS — R06.09 DYSPNEA ON EXERTION: Primary | ICD-10-CM

## 2021-02-12 DIAGNOSIS — I10 ESSENTIAL HYPERTENSION: ICD-10-CM

## 2021-02-12 DIAGNOSIS — R07.2 PRECORDIAL PAIN: ICD-10-CM

## 2021-02-12 DIAGNOSIS — E66.01 CLASS 3 SEVERE OBESITY DUE TO EXCESS CALORIES WITHOUT SERIOUS COMORBIDITY WITH BODY MASS INDEX (BMI) OF 45.0 TO 49.9 IN ADULT (CMD): ICD-10-CM

## 2021-02-12 DIAGNOSIS — I35.0 NONRHEUMATIC AORTIC VALVE STENOSIS: ICD-10-CM

## 2021-02-12 DIAGNOSIS — E78.00 HYPERCHOLESTEREMIA: ICD-10-CM

## 2021-02-12 PROBLEM — E66.9 OBESITY: Status: ACTIVE | Noted: 2021-02-12

## 2021-02-12 PROCEDURE — 93000 ELECTROCARDIOGRAM COMPLETE: CPT | Performed by: INTERNAL MEDICINE

## 2021-02-12 PROCEDURE — 99205 OFFICE O/P NEW HI 60 MIN: CPT | Performed by: INTERNAL MEDICINE

## 2021-02-12 RX ORDER — LEVOTHYROXINE SODIUM 0.07 MG/1
75 TABLET ORAL DAILY
COMMUNITY
Start: 2020-12-09

## 2021-02-12 RX ORDER — SIMVASTATIN 40 MG
40 TABLET ORAL AT BEDTIME
COMMUNITY

## 2021-02-12 RX ORDER — GABAPENTIN 300 MG/1
300 CAPSULE ORAL 3 TIMES DAILY
COMMUNITY
Start: 2020-12-09

## 2021-02-12 RX ORDER — MELOXICAM 15 MG/1
TABLET ORAL
COMMUNITY
Start: 2020-11-24

## 2021-02-12 RX ORDER — LOSARTAN POTASSIUM 25 MG/1
25 TABLET ORAL DAILY
COMMUNITY

## 2021-02-12 RX ORDER — POTASSIUM CHLORIDE 20 MEQ/1
40 TABLET, EXTENDED RELEASE ORAL 4 TIMES DAILY
COMMUNITY

## 2021-02-12 RX ORDER — ERGOCALCIFEROL 1.25 MG/1
50000 CAPSULE ORAL
COMMUNITY

## 2021-02-12 RX ORDER — BUDESONIDE AND FORMOTEROL FUMARATE DIHYDRATE 160; 4.5 UG/1; UG/1
AEROSOL RESPIRATORY (INHALATION)
COMMUNITY

## 2021-02-12 RX ORDER — PANTOPRAZOLE SODIUM 40 MG/1
40 TABLET, DELAYED RELEASE ORAL 2 TIMES DAILY
COMMUNITY
End: 2021-02-12

## 2021-02-12 RX ORDER — SPIRONOLACTONE 25 MG/1
25 TABLET ORAL 2 TIMES DAILY
COMMUNITY

## 2021-02-12 RX ORDER — OXYBUTYNIN CHLORIDE 5 MG/1
5 TABLET ORAL DAILY
COMMUNITY

## 2021-02-12 RX ORDER — HYOSCYAMINE SULFATE 0.125 MG
TABLET ORAL
COMMUNITY
Start: 2020-09-23

## 2021-02-12 RX ORDER — HYDROXYZINE HYDROCHLORIDE 25 MG/1
25 TABLET, FILM COATED ORAL 2 TIMES DAILY
COMMUNITY
Start: 2021-02-02

## 2021-02-12 RX ORDER — TORSEMIDE 20 MG/1
20 TABLET ORAL 2 TIMES DAILY
COMMUNITY

## 2021-02-12 SDOH — HEALTH STABILITY: PHYSICAL HEALTH: ON AVERAGE, HOW MANY MINUTES DO YOU ENGAGE IN EXERCISE AT THIS LEVEL?: 0 MIN

## 2021-02-12 SDOH — SOCIAL STABILITY: SOCIAL NETWORK: ARE YOU MARRIED, WIDOWED, DIVORCED, SEPARATED, NEVER MARRIED, OR LIVING WITH A PARTNER?: MARRIED

## 2021-02-12 SDOH — HEALTH STABILITY: MENTAL HEALTH: HOW OFTEN DO YOU HAVE A DRINK CONTAINING ALCOHOL?: NEVER

## 2021-02-12 SDOH — HEALTH STABILITY: PHYSICAL HEALTH: ON AVERAGE, HOW MANY DAYS PER WEEK DO YOU ENGAGE IN MODERATE TO STRENUOUS EXERCISE (LIKE A BRISK WALK)?: 0 DAYS

## 2021-02-12 ASSESSMENT — ENCOUNTER SYMPTOMS
SUSPICIOUS LESIONS: 0
COUGH: 0
BRUISES/BLEEDS EASILY: 0
HEMOPTYSIS: 0
CHILLS: 0
FEVER: 0
WEIGHT GAIN: 0
HEMATOCHEZIA: 0
WEIGHT LOSS: 0
ALLERGIC/IMMUNOLOGIC COMMENTS: NO NEW FOOD ALLERGIES

## 2021-02-12 ASSESSMENT — PATIENT HEALTH QUESTIONNAIRE - PHQ9
CLINICAL INTERPRETATION OF PHQ2 SCORE: NO FURTHER SCREENING NEEDED
2. FEELING DOWN, DEPRESSED OR HOPELESS: NOT AT ALL
CLINICAL INTERPRETATION OF PHQ9 SCORE: NO FURTHER SCREENING NEEDED
SUM OF ALL RESPONSES TO PHQ9 QUESTIONS 1 AND 2: 0
SUM OF ALL RESPONSES TO PHQ9 QUESTIONS 1 AND 2: 0
1. LITTLE INTEREST OR PLEASURE IN DOING THINGS: NOT AT ALL

## 2021-02-22 ENCOUNTER — HOSPITAL ENCOUNTER (OUTPATIENT)
Dept: CV DIAGNOSTICS | Facility: HOSPITAL | Age: 66
Discharge: HOME | End: 2021-02-22
Attending: INTERNAL MEDICINE
Payer: MEDICARE

## 2021-02-22 DIAGNOSIS — R93.1 ABNORMAL ECHOCARDIOGRAM: ICD-10-CM

## 2021-02-22 DIAGNOSIS — R07.2 PERICARDIAL PAIN: ICD-10-CM

## 2021-02-22 PROCEDURE — 78452 HT MUSCLE IMAGE SPECT MULT: CPT | Performed by: INTERNAL MEDICINE

## 2021-02-22 PROCEDURE — 93017 CV STRESS TEST TRACING ONLY: CPT | Performed by: INTERNAL MEDICINE

## 2021-02-22 PROCEDURE — 93018 CV STRESS TEST I&R ONLY: CPT | Performed by: INTERNAL MEDICINE

## 2021-02-25 ENCOUNTER — OFFICE VISIT (OUTPATIENT)
Dept: ELECTROPHYSIOLOGY | Facility: HOSPITAL | Age: 66
End: 2021-02-25
Attending: INTERNAL MEDICINE
Payer: MEDICARE

## 2021-02-25 DIAGNOSIS — R20.2 PARESTHESIA: Primary | ICD-10-CM

## 2021-02-25 DIAGNOSIS — R20.9 DISTURBANCE OF SKIN SENSATION: ICD-10-CM

## 2021-02-25 PROCEDURE — 95886 MUSC TEST DONE W/N TEST COMP: CPT | Performed by: OTHER

## 2021-02-25 PROCEDURE — 95909 NRV CNDJ TST 5-6 STUDIES: CPT | Performed by: OTHER

## 2021-02-25 NOTE — PROCEDURES
[facility], [address] [place] [address]      PATIENT'S NAME: Vu Mercado PHYSICIAN: Yvonne Bar M.D.    PATIENT ACCOUNT #: [de-identified] LOCATION: [facility]   MEDICAL RECORD #: [id number] DATE OF BIRTH:

## 2021-03-03 ENCOUNTER — E-ADVICE (OUTPATIENT)
Dept: CARDIOLOGY | Age: 66
End: 2021-03-03

## 2021-04-06 ENCOUNTER — OFFICE VISIT (OUTPATIENT)
Dept: NEUROLOGY | Facility: CLINIC | Age: 66
End: 2021-04-06
Payer: COMMERCIAL

## 2021-04-06 VITALS
DIASTOLIC BLOOD PRESSURE: 70 MMHG | HEART RATE: 78 BPM | BODY MASS INDEX: 41 KG/M2 | SYSTOLIC BLOOD PRESSURE: 132 MMHG | RESPIRATION RATE: 16 BRPM | WEIGHT: 238 LBS

## 2021-04-06 DIAGNOSIS — M54.2 PAIN IN THE NECK: ICD-10-CM

## 2021-04-06 DIAGNOSIS — M79.671 PAIN IN BOTH FEET: ICD-10-CM

## 2021-04-06 DIAGNOSIS — M79.672 PAIN IN BOTH FEET: ICD-10-CM

## 2021-04-06 DIAGNOSIS — M62.838 SPASM OF MUSCLE: ICD-10-CM

## 2021-04-06 DIAGNOSIS — R20.2 PARESTHESIA: ICD-10-CM

## 2021-04-06 DIAGNOSIS — G62.9 SENSORY MOTOR NEUROPATHY: Primary | ICD-10-CM

## 2021-04-06 DIAGNOSIS — M54.17 LUMBOSACRAL RADICULOPATHY: ICD-10-CM

## 2021-04-06 PROBLEM — M54.50 BILATERAL LOW BACK PAIN: Status: ACTIVE | Noted: 2021-04-06

## 2021-04-06 PROCEDURE — 3078F DIAST BP <80 MM HG: CPT | Performed by: OTHER

## 2021-04-06 PROCEDURE — 99215 OFFICE O/P EST HI 40 MIN: CPT | Performed by: OTHER

## 2021-04-06 PROCEDURE — 3075F SYST BP GE 130 - 139MM HG: CPT | Performed by: OTHER

## 2021-04-06 RX ORDER — METFORMIN HYDROCHLORIDE EXTENDED-RELEASE TABLETS 500 MG/1
500 TABLET, FILM COATED, EXTENDED RELEASE ORAL 2 TIMES DAILY WITH MEALS
COMMUNITY
End: 2021-09-27 | Stop reason: CLARIF

## 2021-04-06 RX ORDER — PREGABALIN 25 MG/1
25 CAPSULE ORAL 2 TIMES DAILY
Qty: 60 CAPSULE | Refills: 5 | Status: SHIPPED | OUTPATIENT
Start: 2021-04-06 | End: 2021-07-06

## 2021-04-06 RX ORDER — LEVOTHYROXINE SODIUM 0.07 MG/1
75 TABLET ORAL
COMMUNITY

## 2021-04-06 NOTE — PROGRESS NOTES
[address]   Neurology; follow up  CLINIC VISIT  2021    Tyraniranjan Talavera Patient Status:  No patient class for patient encounter    1955 MRN [id number]   Location [address] [place] [address], [address] [place] [address] DILLON Mcfarlane ARTHROSCOPY OF JOINT UNLISTED Right     knee x 2   • ARTHROSCOPY OF JOINT UNLISTED Left 2003    knee x 2   • BACK SURGERY      LUMBAR SURGERY/HARDWARE/RODS   •   1980,1983,1984,   • CARPAL TUNNEL RELEASE Bilateral , , - INJECTION BILATERAL Bilateral 4/26/2018    Performed by Clayton Villarreal MD at [address] [facility] [address]   • TOTAL KNEE REPLACEMENT Left 10/2006 & 4/2010   • TOTAL KNEE REPLACEMENT Right X2   • TUBAL LIGATION  9/1984   • UPPER GI ENDOSCOPY,EXAM  04/2006 & 07/2007 • simvastatin (ZOCOR) 40 MG Oral Tab Take 40 mg by mouth nightly. • torsemide (DEMADEX) 20 MG Oral Tab Take 20 mg by mouth 2 (two) times daily. • Losartan Potassium (COZAAR) 25 MG Oral Tab Take 25 mg by mouth daily.      • Oxybutynin Chloride dysarthria, voice is normal in volume, follow commends well;  Memory and comprehension:  MMSE is normal,   Cranial Nerves       CN II:  Visual fields full, Pupils equal and reactive, Fundi normal       CN III, IV, VI:  Horrizontal and vertical movements no FOLIC ACID SERUM(FOLATE)    ASSAY, THYROID STIM HORMONE    SED RATE, WESTERGREN (AUTOMATED)    RHEUMATOID ARTHRITIS FACTOR    IMMUNOELECTROPHORESIS, SERUM    VITAMIN B6    VITAMIN B12    VITAMIN E, SERUM    HEMOGLOBIN A1C    Spasm of muscle          Imp

## 2021-04-08 ENCOUNTER — OFFICE VISIT (OUTPATIENT)
Dept: SLEEP CENTER | Age: 66
End: 2021-04-08
Payer: MEDICARE

## 2021-04-08 DIAGNOSIS — R06.83 SNORING: ICD-10-CM

## 2021-04-08 DIAGNOSIS — E66.9 OBESITY: ICD-10-CM

## 2021-04-08 PROCEDURE — 95810 POLYSOM 6/> YRS 4/> PARAM: CPT

## 2021-04-26 ENCOUNTER — TELEPHONE (OUTPATIENT)
Dept: SURGERY | Facility: CLINIC | Age: 66
End: 2021-04-26

## 2021-04-26 ENCOUNTER — ORDER TRANSCRIPTION (OUTPATIENT)
Dept: ADMINISTRATIVE | Facility: HOSPITAL | Age: 66
End: 2021-04-26

## 2021-04-26 DIAGNOSIS — Z11.59 ENCOUNTER FOR SCREENING FOR OTHER VIRAL DISEASES: ICD-10-CM

## 2021-04-26 DIAGNOSIS — M54.16 LUMBAR RADICULITIS: ICD-10-CM

## 2021-04-26 DIAGNOSIS — M54.16 LUMBAR RADICULITIS: Primary | ICD-10-CM

## 2021-04-26 DIAGNOSIS — Z01.818 PREOP EXAMINATION: Primary | ICD-10-CM

## 2021-04-26 NOTE — TELEPHONE ENCOUNTER
Per PSR: patient indicated that she need to be sedated clostrphobic. IF so please call patient with answer. Pt states they need to be \"totally out\" and requests anesthesia for spine MRI.

## 2021-04-27 ENCOUNTER — LAB ENCOUNTER (OUTPATIENT)
Dept: LAB | Age: 66
End: 2021-04-27
Attending: Other
Payer: MEDICARE

## 2021-04-27 DIAGNOSIS — G62.9 SENSORY MOTOR NEUROPATHY: ICD-10-CM

## 2021-04-27 PROCEDURE — 84207 ASSAY OF VITAMIN B-6: CPT

## 2021-04-27 PROCEDURE — 86334 IMMUNOFIX E-PHORESIS SERUM: CPT

## 2021-04-27 PROCEDURE — 82607 VITAMIN B-12: CPT

## 2021-04-27 PROCEDURE — 86038 ANTINUCLEAR ANTIBODIES: CPT

## 2021-04-27 PROCEDURE — 83036 HEMOGLOBIN GLYCOSYLATED A1C: CPT

## 2021-04-27 PROCEDURE — 84443 ASSAY THYROID STIM HORMONE: CPT

## 2021-04-27 PROCEDURE — 84165 PROTEIN E-PHORESIS SERUM: CPT

## 2021-04-27 PROCEDURE — 86140 C-REACTIVE PROTEIN: CPT

## 2021-04-27 PROCEDURE — 82746 ASSAY OF FOLIC ACID SERUM: CPT

## 2021-04-27 PROCEDURE — 85652 RBC SED RATE AUTOMATED: CPT

## 2021-04-27 PROCEDURE — 84446 ASSAY OF VITAMIN E: CPT

## 2021-04-27 PROCEDURE — 83883 ASSAY NEPHELOMETRY NOT SPEC: CPT

## 2021-04-27 PROCEDURE — 36415 COLL VENOUS BLD VENIPUNCTURE: CPT

## 2021-04-27 PROCEDURE — 86431 RHEUMATOID FACTOR QUANT: CPT

## 2021-04-27 RX ORDER — DEXTROSE MONOHYDRATE 25 G/50ML
50 INJECTION, SOLUTION INTRAVENOUS
Status: CANCELLED | OUTPATIENT
Start: 2021-04-27

## 2021-04-27 RX ORDER — SODIUM CHLORIDE, SODIUM LACTATE, POTASSIUM CHLORIDE, CALCIUM CHLORIDE 600; 310; 30; 20 MG/100ML; MG/100ML; MG/100ML; MG/100ML
INJECTION, SOLUTION INTRAVENOUS CONTINUOUS
Status: CANCELLED | OUTPATIENT
Start: 2021-04-27

## 2021-05-01 ENCOUNTER — LABORATORY ENCOUNTER (OUTPATIENT)
Dept: LAB | Age: 66
End: 2021-05-01
Payer: MEDICARE

## 2021-05-01 ENCOUNTER — EKG ENCOUNTER (OUTPATIENT)
Dept: LAB | Age: 66
End: 2021-05-01
Payer: MEDICARE

## 2021-05-01 DIAGNOSIS — E78.5 HYPERLIPEMIA: ICD-10-CM

## 2021-05-01 DIAGNOSIS — E11.9 DIABETES MELLITUS (HCC): Primary | ICD-10-CM

## 2021-05-01 DIAGNOSIS — G62.9 SENSORY MOTOR NEUROPATHY: ICD-10-CM

## 2021-05-01 PROCEDURE — 83036 HEMOGLOBIN GLYCOSYLATED A1C: CPT

## 2021-05-01 PROCEDURE — 36415 COLL VENOUS BLD VENIPUNCTURE: CPT

## 2021-05-01 PROCEDURE — 80061 LIPID PANEL: CPT

## 2021-05-01 PROCEDURE — 93010 ELECTROCARDIOGRAM REPORT: CPT | Performed by: INTERNAL MEDICINE

## 2021-05-01 PROCEDURE — 80053 COMPREHEN METABOLIC PANEL: CPT

## 2021-05-01 PROCEDURE — 93005 ELECTROCARDIOGRAM TRACING: CPT

## 2021-05-02 ENCOUNTER — LAB ENCOUNTER (OUTPATIENT)
Dept: LAB | Facility: HOSPITAL | Age: 66
End: 2021-05-02
Attending: Other
Payer: MEDICARE

## 2021-05-02 DIAGNOSIS — Z01.818 PREOP EXAMINATION: ICD-10-CM

## 2021-05-02 DIAGNOSIS — M54.16 LUMBAR RADICULITIS: ICD-10-CM

## 2021-05-02 DIAGNOSIS — Z11.59 ENCOUNTER FOR SCREENING FOR OTHER VIRAL DISEASES: ICD-10-CM

## 2021-05-04 ENCOUNTER — TELEPHONE (OUTPATIENT)
Dept: NEUROLOGY | Facility: CLINIC | Age: 66
End: 2021-05-04

## 2021-05-04 DIAGNOSIS — G62.9 SENSORY MOTOR NEUROPATHY: Primary | ICD-10-CM

## 2021-05-04 NOTE — TELEPHONE ENCOUNTER
Called patient and discussed lab results noted in this TE. Patient VU and wanted to inform Dr. Peggy Montana she is unable to complete PT at this time. Ordered future monoclonal protein study.

## 2021-05-04 NOTE — TELEPHONE ENCOUNTER
----- Message from Sharan Pack MD sent at 4/28/2021  1:20 PM CDT -----  Lab mostly normal, but high Hb A1c  need to talk to primary. Slightly high ESR is non specific. No action is needed.      London Frias MD  [address] [place] [address] Nurse  lab is fine except for hig

## 2021-05-05 ENCOUNTER — ANESTHESIA EVENT (OUTPATIENT)
Dept: MRI IMAGING | Facility: HOSPITAL | Age: 66
End: 2021-05-05
Payer: MEDICARE

## 2021-05-05 ENCOUNTER — ANESTHESIA (OUTPATIENT)
Dept: MRI IMAGING | Facility: HOSPITAL | Age: 66
End: 2021-05-05
Payer: MEDICARE

## 2021-05-05 ENCOUNTER — NURSE ONLY (OUTPATIENT)
Dept: LAB | Facility: HOSPITAL | Age: 66
End: 2021-05-05
Attending: Other
Payer: MEDICARE

## 2021-05-05 ENCOUNTER — HOSPITAL ENCOUNTER (OUTPATIENT)
Dept: MRI IMAGING | Facility: HOSPITAL | Age: 66
Discharge: HOME | End: 2021-05-05
Attending: Other
Payer: MEDICARE

## 2021-05-05 VITALS
SYSTOLIC BLOOD PRESSURE: 116 MMHG | WEIGHT: 248 LBS | HEART RATE: 80 BPM | HEIGHT: 64 IN | OXYGEN SATURATION: 94 % | TEMPERATURE: 98 F | RESPIRATION RATE: 18 BRPM | BODY MASS INDEX: 42.34 KG/M2 | DIASTOLIC BLOOD PRESSURE: 89 MMHG

## 2021-05-05 DIAGNOSIS — M54.16 LUMBAR RADICULITIS: ICD-10-CM

## 2021-05-05 DIAGNOSIS — G62.9 SENSORY MOTOR NEUROPATHY: Primary | ICD-10-CM

## 2021-05-05 PROCEDURE — 82962 GLUCOSE BLOOD TEST: CPT

## 2021-05-05 PROCEDURE — 72148 MRI LUMBAR SPINE W/O DYE: CPT | Performed by: OTHER

## 2021-05-05 RX ORDER — SODIUM CHLORIDE, SODIUM LACTATE, POTASSIUM CHLORIDE, CALCIUM CHLORIDE 600; 310; 30; 20 MG/100ML; MG/100ML; MG/100ML; MG/100ML
INJECTION, SOLUTION INTRAVENOUS CONTINUOUS
Status: CANCELLED | OUTPATIENT
Start: 2021-05-05

## 2021-05-05 RX ORDER — DEXTROSE MONOHYDRATE 25 G/50ML
50 INJECTION, SOLUTION INTRAVENOUS
Status: CANCELLED | OUTPATIENT
Start: 2021-05-05

## 2021-05-05 RX ORDER — NALOXONE HYDROCHLORIDE 0.4 MG/ML
80 INJECTION, SOLUTION INTRAMUSCULAR; INTRAVENOUS; SUBCUTANEOUS AS NEEDED
Status: CANCELLED | OUTPATIENT
Start: 2021-05-05 | End: 2021-05-05

## 2021-05-05 RX ORDER — SODIUM CHLORIDE 9 MG/ML
INJECTION, SOLUTION INTRAVENOUS CONTINUOUS PRN
Status: DISCONTINUED | OUTPATIENT
Start: 2021-05-05 | End: 2021-05-05 | Stop reason: SURG

## 2021-05-05 RX ADMIN — SODIUM CHLORIDE: 9 INJECTION, SOLUTION INTRAVENOUS at 15:05:00

## 2021-05-05 RX ADMIN — SODIUM CHLORIDE: 9 INJECTION, SOLUTION INTRAVENOUS at 13:53:00

## 2021-05-05 NOTE — ANESTHESIA POSTPROCEDURE EVALUATION
Lanette [address] Patient Status:  Outpatient   Age/Gender 72year old female MRN [id number]   Location [facility] MRI Attending Yadira Sue MD   Hosp Day # 0 PCP Katty Mosher MD       Anesthesia Post-op Note        Procedure Sum

## 2021-05-05 NOTE — ANESTHESIA PREPROCEDURE EVALUATION
PRE-OP EVALUATION    Patient Name: Russell Mcbride    Admit Diagnosis: Lumbar radiculitis [M54.16]    Pre-op Diagnosis: * No surgery found *        Anesthesia Procedure: MRI SPINE LUMBAR (CPT=72148)    * Surgery not found *    Pre-op vitals reviewed.   Temp: abdominoplasty   • OTHER SURGICAL HISTORY  06/2009    removal cement piece right knee   • OTHER SURGICAL HISTORY  10/2004    extraction of all her teeth   • OTHER SURGICAL HISTORY  08/2003 & 8/2013    open abdominal wound repair   • TOTAL KNEE REPLACEMENT

## 2021-07-06 ENCOUNTER — OFFICE VISIT (OUTPATIENT)
Dept: NEUROLOGY | Facility: CLINIC | Age: 66
End: 2021-07-06
Payer: COMMERCIAL

## 2021-07-06 VITALS
RESPIRATION RATE: 18 BRPM | SYSTOLIC BLOOD PRESSURE: 108 MMHG | HEART RATE: 60 BPM | BODY MASS INDEX: 40 KG/M2 | WEIGHT: 235 LBS | DIASTOLIC BLOOD PRESSURE: 74 MMHG

## 2021-07-06 DIAGNOSIS — M79.622 PAIN IN BOTH UPPER ARMS: ICD-10-CM

## 2021-07-06 DIAGNOSIS — M79.672 PAIN IN BOTH FEET: Primary | ICD-10-CM

## 2021-07-06 DIAGNOSIS — M62.838 SPASM OF MUSCLE: ICD-10-CM

## 2021-07-06 DIAGNOSIS — R20.2 PARESTHESIA: ICD-10-CM

## 2021-07-06 DIAGNOSIS — M79.671 PAIN IN BOTH FEET: Primary | ICD-10-CM

## 2021-07-06 DIAGNOSIS — M51.16 LUMBAR DISC DISEASE WITH RADICULOPATHY: ICD-10-CM

## 2021-07-06 DIAGNOSIS — M79.621 PAIN IN BOTH UPPER ARMS: ICD-10-CM

## 2021-07-06 DIAGNOSIS — G62.9 SENSORY MOTOR NEUROPATHY: ICD-10-CM

## 2021-07-06 PROCEDURE — 99214 OFFICE O/P EST MOD 30 MIN: CPT | Performed by: OTHER

## 2021-07-06 PROCEDURE — 3078F DIAST BP <80 MM HG: CPT | Performed by: OTHER

## 2021-07-06 PROCEDURE — 3074F SYST BP LT 130 MM HG: CPT | Performed by: OTHER

## 2021-07-06 RX ORDER — PREGABALIN 50 MG/1
50 CAPSULE ORAL 2 TIMES DAILY
COMMUNITY
End: 2021-07-06

## 2021-07-06 RX ORDER — PREGABALIN 75 MG/1
75 CAPSULE ORAL 3 TIMES DAILY
Qty: 90 CAPSULE | Refills: 5 | Status: SHIPPED | OUTPATIENT
Start: 2021-07-06 | End: 2021-09-27

## 2021-07-06 RX ORDER — NAPROXEN 500 MG/1
500 TABLET ORAL 2 TIMES DAILY WITH MEALS
Qty: 60 TABLET | Refills: 1 | Status: SHIPPED | OUTPATIENT
Start: 2021-07-06 | End: 2021-08-27

## 2021-07-06 NOTE — PROGRESS NOTES
[address]   Neurology; follow up  CLINIC VISIT  2021    [place] Horacio Patient Status:  No patient class for patient encounter    1955 MRN [id number]   Location [address] [place] [address], [address] [place] [address] PCP Juan Jose High blood pressure    • High cholesterol    • History of stomach ulcers    • Leaking of urine    • Nausea    • Neuropathy     both feet   • PAULA (obstructive sleep apnea) 4/8/21 [facility] PSG    AHI 20.2 REM AHI 70.5 Supine AHI 15.8 Lateral AHI 34.8   • PONV (po father and mother; Other in her mother. SOCIAL HISTORY:   reports that she quit smoking about 37 years ago. She has a 7.50 pack-year smoking history.  She has never used smokeless tobacco. She reports that she does not drink alcohol and does not use drug EYES: no visual complaints or deficits  HEENT: denies nasal congestion, sinus pain or sore throat; no  hearing loss;  RESPIRATORY: denies shortness of breath, wheezing or cough   CARDIOVASCULAR: denies chest pain, no palpitations   GI: denies nausea, vom nystagmus, no ptosis       CN V: Masseters strong, Facial sensations normal,        CN  VII:  Symmetric facial movement       CN VIII:  Normal hearing       CN IX, XI:  Normal Gag, uvula palate midline       CN XII:  SCM strong, equal shoulder shrug  Motor in both feet - Primary    Pain in both upper arms    Paresthesia    Sensory motor neuropathy    Spasm of muscle          Impression/Plan:  (M79.671,  M79.672) Pain in both feet  (primary encounter diagnosis)    (M79.621,  P06.096) Pain in both upper arms

## 2021-07-21 ENCOUNTER — OFFICE VISIT (OUTPATIENT)
Dept: SURGERY | Facility: CLINIC | Age: 66
End: 2021-07-21
Payer: COMMERCIAL

## 2021-07-21 VITALS
RESPIRATION RATE: 16 BRPM | DIASTOLIC BLOOD PRESSURE: 60 MMHG | OXYGEN SATURATION: 96 % | WEIGHT: 235 LBS | HEART RATE: 91 BPM | BODY MASS INDEX: 40.12 KG/M2 | HEIGHT: 64 IN | SYSTOLIC BLOOD PRESSURE: 112 MMHG

## 2021-07-21 DIAGNOSIS — M54.16 LUMBAR RADICULITIS: ICD-10-CM

## 2021-07-21 DIAGNOSIS — Z98.1 S/P LUMBAR FUSION: Primary | ICD-10-CM

## 2021-07-21 PROCEDURE — 3074F SYST BP LT 130 MM HG: CPT | Performed by: PHYSICIAN ASSISTANT

## 2021-07-21 PROCEDURE — 3078F DIAST BP <80 MM HG: CPT | Performed by: PHYSICIAN ASSISTANT

## 2021-07-21 PROCEDURE — 99203 OFFICE O/P NEW LOW 30 MIN: CPT | Performed by: PHYSICIAN ASSISTANT

## 2021-07-21 PROCEDURE — 3008F BODY MASS INDEX DOCD: CPT | Performed by: PHYSICIAN ASSISTANT

## 2021-07-21 RX ORDER — TRAMADOL HYDROCHLORIDE 50 MG/1
TABLET ORAL
COMMUNITY
Start: 2021-07-14 | End: 2021-09-27

## 2021-07-21 NOTE — H&P
Neurosurgery Clinic Visit  2021    Tasha Longoria PCP:  Cristofer Brower MD    1955 MRN [id number]       CC:  Back Pain    HPI:    Leidy Palencia is a very pleasant 72year old female with PMH of L4-5 MIS TLIF in  who presents with progressivel ([facility]    • Diarrhea, unspecified    • High blood pressure    • High cholesterol    • History of stomach ulcers    • Leaking of urine    • Nausea    • Neuropathy     both feet   • PAULA (obstructive sleep apnea) 4/8/21 [facility] PSG    AHI 20.2 REM AHI 70.5 Supine not performed.   Neurologic: Alert and Oriented x 3, CN 2-12 GI, Speech Fluent, Negative Romberg, Negative Pronator Drift, Finger-to-Nose Intact, SILT, Gait Antalgic, DTRs equally diminished without douglas's or clonus  Palpation Right  Left Midline   Cerv

## 2021-07-26 ENCOUNTER — LAB ENCOUNTER (OUTPATIENT)
Dept: LAB | Age: 66
End: 2021-07-26
Attending: INTERNAL MEDICINE
Payer: MEDICARE

## 2021-07-26 DIAGNOSIS — Z01.818 PREOP EXAMINATION: ICD-10-CM

## 2021-07-26 DIAGNOSIS — Z11.59 SCREENING FOR VIRAL DISEASE: ICD-10-CM

## 2021-07-27 LAB — SARS-COV-2 RNA RESP QL NAA+PROBE: NOT DETECTED

## 2021-07-28 ENCOUNTER — OFFICE VISIT (OUTPATIENT)
Dept: SLEEP CENTER | Age: 66
End: 2021-07-28
Attending: INTERNAL MEDICINE
Payer: MEDICARE

## 2021-07-28 DIAGNOSIS — G47.33 OSA (OBSTRUCTIVE SLEEP APNEA): ICD-10-CM

## 2021-07-28 PROCEDURE — 95811 POLYSOM 6/>YRS CPAP 4/> PARM: CPT

## 2021-07-29 ENCOUNTER — HOSPITAL ENCOUNTER (OUTPATIENT)
Dept: CT IMAGING | Facility: HOSPITAL | Age: 66
Discharge: HOME | End: 2021-07-29
Attending: PHYSICIAN ASSISTANT
Payer: MEDICARE

## 2021-07-29 ENCOUNTER — HOSPITAL ENCOUNTER (OUTPATIENT)
Dept: GENERAL RADIOLOGY | Facility: HOSPITAL | Age: 66
Discharge: HOME | End: 2021-07-29
Attending: PHYSICIAN ASSISTANT
Payer: MEDICARE

## 2021-07-29 DIAGNOSIS — Z98.1 S/P LUMBAR FUSION: ICD-10-CM

## 2021-07-29 DIAGNOSIS — M54.16 LUMBAR RADICULITIS: ICD-10-CM

## 2021-07-29 PROCEDURE — 72131 CT LUMBAR SPINE W/O DYE: CPT | Performed by: PHYSICIAN ASSISTANT

## 2021-07-29 PROCEDURE — 72114 X-RAY EXAM L-S SPINE BENDING: CPT | Performed by: PHYSICIAN ASSISTANT

## 2021-07-30 ENCOUNTER — TELEPHONE (OUTPATIENT)
Dept: SURGERY | Facility: CLINIC | Age: 66
End: 2021-07-30

## 2021-08-13 ENCOUNTER — APPOINTMENT (OUTPATIENT)
Dept: CARDIOLOGY | Age: 66
End: 2021-08-13

## 2021-08-26 ENCOUNTER — OFFICE VISIT (OUTPATIENT)
Dept: SURGERY | Facility: CLINIC | Age: 66
End: 2021-08-26
Payer: COMMERCIAL

## 2021-08-26 VITALS
HEIGHT: 64 IN | DIASTOLIC BLOOD PRESSURE: 80 MMHG | SYSTOLIC BLOOD PRESSURE: 130 MMHG | BODY MASS INDEX: 42.34 KG/M2 | WEIGHT: 248 LBS

## 2021-08-26 DIAGNOSIS — Z98.1 S/P LUMBAR FUSION: Primary | ICD-10-CM

## 2021-08-26 PROCEDURE — 99213 OFFICE O/P EST LOW 20 MIN: CPT | Performed by: NEUROLOGICAL SURGERY

## 2021-08-26 PROCEDURE — 3079F DIAST BP 80-89 MM HG: CPT | Performed by: NEUROLOGICAL SURGERY

## 2021-08-26 PROCEDURE — 3008F BODY MASS INDEX DOCD: CPT | Performed by: NEUROLOGICAL SURGERY

## 2021-08-26 PROCEDURE — 3075F SYST BP GE 130 - 139MM HG: CPT | Performed by: NEUROLOGICAL SURGERY

## 2021-08-26 NOTE — PROGRESS NOTES
Neurosurgery Clinic Visit  2021    Theresa Destiny PCP:  Brittany Ponce MD    1955 MRN [id number]     HISTORY OF PRESENT ILLNESS:  Theresa Dixon is a(n) 72year old female here for follow-up  She complains of low back pain  Is across her

## 2021-08-26 NOTE — PROGRESS NOTES
Here to discuss option  Had previous spinal fusion that didn't take    Had imaging here 7/29/21 in chart  Still having pain, nothing has changed as far as that is concerned

## 2021-08-26 NOTE — PATIENT INSTRUCTIONS
Refill policies:    • Allow 2-3 business days for refills; controlled substances may take longer.   • Contact your pharmacy at least 5 days prior to running out of medication and have them send an electronic request or submit request through the “request re Depending on your insurance carrier, approval may take 3-10 days. It is highly recommended patients contact their insurance carrier directly to determine coverage.   If test is done without insurance authorization, patient may be responsible for the entire LUMBAR 4/LUMBAR 5, ALLOGRAFT, AUTOGRAFT, AND ALL INDICATED PROCEDURES on 10/13/21 with     · PCP clearance is needed. We have faxed a request for pre-op clearance to you PCP Dr. Carol Almonte. Please contact their office for appointment.  When Purple the night before surgery. This soap can be found at any pharmacy in the first aid section. See detailed instructions below. · Our office will get prior authorization for surgery through your insurance.     ·  Surgery is usually scheduled as 1 day admi from 4:00-5:00 pm.  Call Pre-admission Testing (PAT) to register at:  [phone]. Please park in the [address] [place] [address], check in at registration and meet by the fish tank in the [place] for your escort to class on the Ortho Spine unit.      If

## 2021-08-27 ENCOUNTER — TELEPHONE (OUTPATIENT)
Dept: SURGERY | Facility: CLINIC | Age: 66
End: 2021-08-27

## 2021-08-27 DIAGNOSIS — Z98.1 S/P LUMBAR FUSION: Primary | ICD-10-CM

## 2021-08-27 DIAGNOSIS — M54.16 LUMBAR RADICULITIS: ICD-10-CM

## 2021-08-27 DIAGNOSIS — M79.671 PAIN IN BOTH FEET: Primary | ICD-10-CM

## 2021-08-27 DIAGNOSIS — M79.672 PAIN IN BOTH FEET: Primary | ICD-10-CM

## 2021-08-27 RX ORDER — NAPROXEN 500 MG/1
TABLET ORAL
Qty: 60 TABLET | Refills: 1 | Status: SHIPPED | OUTPATIENT
Start: 2021-08-27 | End: 2021-10-04

## 2021-08-27 NOTE — TELEPHONE ENCOUNTER
Medication: NAPROXEN 500 MG Oral Tab    Date of last refill: 7/6/21 (#60/1)  [place]    Last office visit: 7/6/21   Due back to clinic per last office note:  Return in about 6 months (around 1/6/2022),     Date next [id number]  7/6/2021

## 2021-08-27 NOTE — TELEPHONE ENCOUNTER
Patient is scheduled for EXPLORATION OF HARDWARE AT LUMBAR 4/ LUMBAR 5, EXPLORATION OF FUSION, ARTHRODESIS OF LUMBAR 4/LUMBAR 5, ALLOGRAFT, AUTOGRAFT, AND ALL INDICATED PROCEDURES on 10/27/21 with Dr Kaz Linda. [employer] form completed  YSurgery order signed   Y Placed sx on surgery sheet  YPlaced on outlook calendar  Y Aries [employer] message sent to patient with sx instructions  Y Faxed pre-op clearance request to PCP Dr. Salvador Maza, confirmation received    Y Post-op appointments made   Y PA needed. Routed to PA team to initiate. Y 3 day follow up reminder placed.       Confirming with Dr. Kaz Linda if TLSO brace is needed

## 2021-09-27 RX ORDER — SIMVASTATIN 40 MG
40 TABLET ORAL NIGHTLY
COMMUNITY

## 2021-09-27 RX ORDER — DEXTROSE MONOHYDRATE 25 G/50ML
50 INJECTION, SOLUTION INTRAVENOUS
Status: CANCELLED | OUTPATIENT
Start: 2021-09-27

## 2021-09-27 RX ORDER — ACETAMINOPHEN 500 MG
1000 TABLET ORAL ONCE
Status: CANCELLED | OUTPATIENT
Start: 2021-09-27 | End: 2021-09-27

## 2021-09-27 RX ORDER — SODIUM CHLORIDE, SODIUM LACTATE, POTASSIUM CHLORIDE, CALCIUM CHLORIDE 600; 310; 30; 20 MG/100ML; MG/100ML; MG/100ML; MG/100ML
INJECTION, SOLUTION INTRAVENOUS CONTINUOUS
Status: CANCELLED | OUTPATIENT
Start: 2021-09-27

## 2021-09-27 RX ORDER — MIRABEGRON 25 MG/1
25 TABLET, FILM COATED, EXTENDED RELEASE ORAL DAILY
Status: ON HOLD | COMMUNITY
End: 2021-12-01

## 2021-09-27 RX ORDER — CEFAZOLIN SODIUM/WATER 2 G/20 ML
2 SYRINGE (ML) INTRAVENOUS ONCE
Status: CANCELLED | OUTPATIENT
Start: 2021-09-27 | End: 2021-09-27

## 2021-09-30 ENCOUNTER — HOSPITAL ENCOUNTER (OUTPATIENT)
Dept: GENERAL RADIOLOGY | Age: 66
Discharge: HOME | End: 2021-09-30
Attending: NEUROLOGICAL SURGERY
Payer: MEDICARE

## 2021-09-30 ENCOUNTER — EKG ENCOUNTER (OUTPATIENT)
Dept: LAB | Age: 66
End: 2021-09-30
Payer: MEDICARE

## 2021-09-30 ENCOUNTER — LABORATORY ENCOUNTER (OUTPATIENT)
Dept: LAB | Age: 66
End: 2021-09-30
Attending: NEUROLOGICAL SURGERY
Payer: MEDICARE

## 2021-09-30 DIAGNOSIS — M54.16 LUMBAR RADICULITIS: ICD-10-CM

## 2021-09-30 DIAGNOSIS — G62.9 SENSORY MOTOR NEUROPATHY: ICD-10-CM

## 2021-09-30 DIAGNOSIS — M54.16 LUMBAR RADICULOPATHY: Primary | ICD-10-CM

## 2021-09-30 LAB
ATRIAL RATE: 78 BPM
P AXIS: 41 DEGREES
P-R INTERVAL: 156 MS
Q-T INTERVAL: 378 MS
QRS DURATION: 86 MS
QTC CALCULATION (BEZET): 430 MS
R AXIS: 10 DEGREES
T AXIS: 45 DEGREES
VENTRICULAR RATE: 78 BPM

## 2021-09-30 PROCEDURE — 36415 COLL VENOUS BLD VENIPUNCTURE: CPT

## 2021-09-30 PROCEDURE — 86334 IMMUNOFIX E-PHORESIS SERUM: CPT

## 2021-09-30 PROCEDURE — 85730 THROMBOPLASTIN TIME PARTIAL: CPT

## 2021-09-30 PROCEDURE — 71046 X-RAY EXAM CHEST 2 VIEWS: CPT | Performed by: NEUROLOGICAL SURGERY

## 2021-09-30 PROCEDURE — 84165 PROTEIN E-PHORESIS SERUM: CPT

## 2021-09-30 PROCEDURE — 83883 ASSAY NEPHELOMETRY NOT SPEC: CPT

## 2021-09-30 PROCEDURE — 87081 CULTURE SCREEN ONLY: CPT

## 2021-09-30 PROCEDURE — 85610 PROTHROMBIN TIME: CPT

## 2021-09-30 PROCEDURE — 93005 ELECTROCARDIOGRAM TRACING: CPT

## 2021-09-30 PROCEDURE — 93010 ELECTROCARDIOGRAM REPORT: CPT | Performed by: INTERNAL MEDICINE

## 2021-09-30 PROCEDURE — 80053 COMPREHEN METABOLIC PANEL: CPT

## 2021-09-30 PROCEDURE — 85025 COMPLETE CBC W/AUTO DIFF WBC: CPT

## 2021-09-30 NOTE — TELEPHONE ENCOUNTER
Lab calling with CRITICAL results:  HGB 6.5       Routed to Dr Abby Lobo. Called patient's PCP Dr Petty Reaves office. Spoke to Dr Liz Mari and informed her of results. She will call the patient now.

## 2021-10-03 DIAGNOSIS — M79.671 PAIN IN BOTH FEET: ICD-10-CM

## 2021-10-03 DIAGNOSIS — M79.672 PAIN IN BOTH FEET: ICD-10-CM

## 2021-10-04 ENCOUNTER — HOSPITAL ENCOUNTER (OUTPATIENT)
Facility: HOSPITAL | Age: 66
Setting detail: OBSERVATION
Discharge: HOME | End: 2021-10-05
Attending: EMERGENCY MEDICINE | Admitting: INTERNAL MEDICINE
Payer: MEDICARE

## 2021-10-04 ENCOUNTER — TELEPHONE (OUTPATIENT)
Dept: SURGERY | Facility: CLINIC | Age: 66
End: 2021-10-04

## 2021-10-04 DIAGNOSIS — D64.9 SYMPTOMATIC ANEMIA: Primary | ICD-10-CM

## 2021-10-04 PROCEDURE — 36430 TRANSFUSION BLD/BLD COMPNT: CPT

## 2021-10-04 PROCEDURE — 99285 EMERGENCY DEPT VISIT HI MDM: CPT

## 2021-10-04 PROCEDURE — 86850 RBC ANTIBODY SCREEN: CPT | Performed by: PHYSICIAN ASSISTANT

## 2021-10-04 PROCEDURE — 85025 COMPLETE CBC W/AUTO DIFF WBC: CPT | Performed by: PHYSICIAN ASSISTANT

## 2021-10-04 PROCEDURE — C9113 INJ PANTOPRAZOLE SODIUM, VIA: HCPCS | Performed by: INTERNAL MEDICINE

## 2021-10-04 PROCEDURE — 86900 BLOOD TYPING SEROLOGIC ABO: CPT | Performed by: PHYSICIAN ASSISTANT

## 2021-10-04 PROCEDURE — 86901 BLOOD TYPING SEROLOGIC RH(D): CPT | Performed by: PHYSICIAN ASSISTANT

## 2021-10-04 PROCEDURE — 80053 COMPREHEN METABOLIC PANEL: CPT | Performed by: PHYSICIAN ASSISTANT

## 2021-10-04 PROCEDURE — 85610 PROTHROMBIN TIME: CPT | Performed by: PHYSICIAN ASSISTANT

## 2021-10-04 PROCEDURE — 30233N1 TRANSFUSION OF NONAUTOLOGOUS RED BLOOD CELLS INTO PERIPHERAL VEIN, PERCUTANEOUS APPROACH: ICD-10-PCS | Performed by: INTERNAL MEDICINE

## 2021-10-04 PROCEDURE — 96374 THER/PROPH/DIAG INJ IV PUSH: CPT

## 2021-10-04 PROCEDURE — 82272 OCCULT BLD FECES 1-3 TESTS: CPT

## 2021-10-04 PROCEDURE — 86920 COMPATIBILITY TEST SPIN: CPT

## 2021-10-04 PROCEDURE — 94660 CPAP INITIATION&MGMT: CPT

## 2021-10-04 RX ORDER — ATORVASTATIN CALCIUM 40 MG/1
40 TABLET, FILM COATED ORAL NIGHTLY
Status: DISCONTINUED | OUTPATIENT
Start: 2021-10-04 | End: 2021-10-05

## 2021-10-04 RX ORDER — NAPROXEN 500 MG/1
TABLET ORAL
Qty: 60 TABLET | Refills: 1 | Status: SHIPPED | OUTPATIENT
Start: 2021-10-04 | End: 2021-10-05

## 2021-10-04 RX ORDER — LEVOTHYROXINE SODIUM 0.07 MG/1
75 TABLET ORAL
Status: DISCONTINUED | OUTPATIENT
Start: 2021-10-04 | End: 2021-10-05

## 2021-10-04 RX ORDER — PANTOPRAZOLE SODIUM 40 MG/1
40 TABLET, DELAYED RELEASE ORAL
Status: DISCONTINUED | OUTPATIENT
Start: 2021-10-05 | End: 2021-10-04

## 2021-10-04 RX ORDER — SUCRALFATE ORAL 1 G/10ML
1 SUSPENSION ORAL
Status: DISCONTINUED | OUTPATIENT
Start: 2021-10-04 | End: 2021-10-05

## 2021-10-04 RX ORDER — LOSARTAN POTASSIUM 25 MG/1
25 TABLET ORAL DAILY
Status: DISCONTINUED | OUTPATIENT
Start: 2021-10-04 | End: 2021-10-05

## 2021-10-04 RX ORDER — POTASSIUM CHLORIDE 20 MEQ/1
40 TABLET, EXTENDED RELEASE ORAL 4 TIMES DAILY
Status: DISCONTINUED | OUTPATIENT
Start: 2021-10-04 | End: 2021-10-05

## 2021-10-04 RX ORDER — PANTOPRAZOLE SODIUM 40 MG/1
40 TABLET, DELAYED RELEASE ORAL
Status: ON HOLD | COMMUNITY
End: 2021-10-05

## 2021-10-04 RX ORDER — NAPROXEN 500 MG/1
500 TABLET ORAL 2 TIMES DAILY WITH MEALS
Status: DISCONTINUED | OUTPATIENT
Start: 2021-10-05 | End: 2021-10-05

## 2021-10-04 RX ORDER — TORSEMIDE 20 MG/1
20 TABLET ORAL
Status: DISCONTINUED | OUTPATIENT
Start: 2021-10-05 | End: 2021-10-05

## 2021-10-04 RX ORDER — SPIRONOLACTONE 25 MG/1
25 TABLET ORAL
Status: DISCONTINUED | OUTPATIENT
Start: 2021-10-05 | End: 2021-10-05

## 2021-10-04 NOTE — TELEPHONE ENCOUNTER
Received fax from [facility] PAT re: abnormal test results. Hematology Critical Low Hgb HCT. Endorsed to provider for review.

## 2021-10-04 NOTE — ED QUICK NOTES
Orders for admission, patient is aware of plan and ready to go upstairs. Any questions, please call ED ANOOP Torres at extension [phone]. Vaccinated?  Yes  Type of COVID test sent: Rapid  COVID Suspicion level: Low      Titratable drug(s) infusing:Blood  Ra

## 2021-10-04 NOTE — TELEPHONE ENCOUNTER
Medication: NAPROXEN 500 MG     Date of last refill: 8/27/21 (#60/1)  [place]    Last office visit: 7/6/21  Due back to clinic per last office note:  6 mo  Date next office visit scheduled:    Future Appointments   Da

## 2021-10-04 NOTE — ED PROVIDER NOTES
I reviewed that chart and discussed the case. I have examined the patient and noted low hemoglobin noted on preop labs. Patient has been tired/fatigued recently. No active GI bleeding.   Transfusion started per request of Dr. Baldev Carrero    I agree with th

## 2021-10-04 NOTE — ED PROVIDER NOTES
Patient Seen in: [facility] Emergency Department      History   Patient presents with:  Abnormal Labs    Stated Complaint:     Subjective:   HPI    42-year-old female.   Advised to report to the emergency department by primary care physician.  5 days DILATION/CURETTAGE,DIAGNOSTIC  6/1997, 5/2002   • FOOT SURGERY  02/2003    Rt.  foot fx. repair   • FOOT SURGERY  09/2002    neuroma left foot   • GASTRIC BYPASS,OBESITY,SB RECONSTRUC  8/2000   • HERNIA SURGERY  06/2003    ventral & abdominoplasty   • OTHER trauma, Skin warm and dry, no induration or sign of infection.   No rash noted      ED Course     Labs Reviewed   COMP METABOLIC PANEL (14) - Abnormal; Notable for the following components:       Result Value    Glucose 103 (*)     BUN 24 (*)     Creatinine PT/INR. Patient does have pallor on exam.  Otherwise is well-appearing and cheerful  At bedside Hemoccult will be performed  Critical lab, hemoglobin 6.6  Creatinine of 1.06      Consulted Dr. Genia Melara; will plan on administering 2 units. Admit.   Will c

## 2021-10-05 ENCOUNTER — ANESTHESIA EVENT (OUTPATIENT)
Dept: SURGERY | Facility: HOSPITAL | Age: 66
DRG: 460 | End: 2021-10-05
Payer: MEDICARE

## 2021-10-05 VITALS
TEMPERATURE: 99 F | RESPIRATION RATE: 25 BRPM | HEIGHT: 64 IN | BODY MASS INDEX: 42.68 KG/M2 | HEART RATE: 81 BPM | DIASTOLIC BLOOD PRESSURE: 53 MMHG | SYSTOLIC BLOOD PRESSURE: 103 MMHG | OXYGEN SATURATION: 95 % | WEIGHT: 250 LBS

## 2021-10-05 PROCEDURE — 85025 COMPLETE CBC W/AUTO DIFF WBC: CPT | Performed by: INTERNAL MEDICINE

## 2021-10-05 PROCEDURE — C9113 INJ PANTOPRAZOLE SODIUM, VIA: HCPCS | Performed by: STUDENT IN AN ORGANIZED HEALTH CARE EDUCATION/TRAINING PROGRAM

## 2021-10-05 PROCEDURE — 96376 TX/PRO/DX INJ SAME DRUG ADON: CPT

## 2021-10-05 PROCEDURE — 85018 HEMOGLOBIN: CPT | Performed by: INTERNAL MEDICINE

## 2021-10-05 PROCEDURE — 96375 TX/PRO/DX INJ NEW DRUG ADDON: CPT

## 2021-10-05 RX ORDER — HYDROCODONE BITARTRATE AND ACETAMINOPHEN 5; 325 MG/1; MG/1
1 TABLET ORAL EVERY 6 HOURS PRN
Status: DISCONTINUED | OUTPATIENT
Start: 2021-10-05 | End: 2021-10-05

## 2021-10-05 RX ORDER — ONDANSETRON 2 MG/ML
4 INJECTION INTRAMUSCULAR; INTRAVENOUS EVERY 6 HOURS PRN
Status: DISCONTINUED | OUTPATIENT
Start: 2021-10-05 | End: 2021-10-05

## 2021-10-05 RX ORDER — PANTOPRAZOLE SODIUM 40 MG/1
40 TABLET, DELAYED RELEASE ORAL
Qty: 60 TABLET | Refills: 0 | Status: SHIPPED | OUTPATIENT
Start: 2021-10-05 | End: 2021-11-04

## 2021-10-05 RX ORDER — SUCRALFATE ORAL 1 G/10ML
1 SUSPENSION ORAL
Qty: 400 ML | Refills: 0 | Status: SHIPPED | OUTPATIENT
Start: 2021-10-05 | End: 2021-10-15

## 2021-10-05 NOTE — PROGRESS NOTES
NURSING ADMISSION NOTE      Patient admitted via [address] to room 517  Safety precautions initiated. Bed in low position. Admission navigator completed  MD paged, see new admission orders  2nd unit of blood transfusing at this time.      Alert

## 2021-10-05 NOTE — H&P (VIEW-ONLY)
[address]  Report of GI Consultation    Afshan Reza Patient Status:  Inpatient    1955 MRN [id number]   [facility] 5NW-A Attending John Marquez MD   Hosp Day # 0 PCP Zack Harris MD     Date of Admission:  10 COLECTOMY  06/2004 & 11/2004   • COLONOSCOPY N/A 8/10/2017    Procedure: COLONOSCOPY;  Surgeon: King Blanchard MD;  Location: [address] [place] [address] ENDOSCOPY   • COLONOSCOPY     • CYSTOSCOPY,REMV CALCULUS,SIMPLE  3/2002    kidney stone removed   • DILATION/CURETTAGE,DIAG 93/66, pulse 89, temperature 99.7 °F (37.6 °C), temperature source Oral, resp. rate 20, height 5' 4\" (1.626 m), weight 250 lb (113.4 kg), last menstrual period 05/23/2004, SpO2 97 %.     GENERAL: NAD, oriented x 3, pleasant  HENT: Normocephalic, no tempora your patient.     Jason Partida MD MD  10/4/2021

## 2021-10-05 NOTE — PROGRESS NOTES
[facility]  Report of GI Progress Note    Maximilianoiman Blockin Patient Status:  Inpatient    1955 MRN [id number]   [facility] 5NW-A Attending Jamie Velasquez MD   Hosp Day # 1 PCP Ev Parsons MD     Date of Admission:  1 pleasant  CV: Regular, no murmurs  ABD: Obese, NT/ND  EXT: No edema    Results:     Laboratory Data:  Lab Results   Component Value Date    WBC 7.8 10/05/2021    HGB 8.3 (L) 10/05/2021    .0 10/05/2021    CREATSERUM 1.06 (H) 10/04/2021    BUN 24 (H)

## 2021-10-05 NOTE — CONSULTS
[facility]  Report of GI Consultation    Leisa Laurent Patient Status:  Inpatient    1955 MRN [id number]   [facility] 5NW-A Attending Crow Kennedy MD   Hosp Day # 0 PCP Amie Rodriguez MD     Date of Admission:  10 COLECTOMY  06/2004 & 11/2004   • COLONOSCOPY N/A 8/10/2017    Procedure: COLONOSCOPY;  Surgeon: Aneta Pickard MD;  Location: [facility] ENDOSCOPY   • COLONOSCOPY     • CYSTOSCOPY,REMV CALCULUS,SIMPLE  3/2002    kidney stone removed   • DILATION/CURETTAGE,DIAG 93/66, pulse 89, temperature 99.7 °F (37.6 °C), temperature source Oral, resp. rate 20, height 5' 4\" (1.626 m), weight 250 lb (113.4 kg), last menstrual period 05/23/2004, SpO2 97 %.     GENERAL: NAD, oriented x 3, pleasant  HENT: Normocephalic, no tempora your patient.     Ydaira Kent MD MD  10/4/2021

## 2021-10-05 NOTE — PROGRESS NOTES
NURSING DISCHARGE NOTE    Discharged Home via [facility]. Accompanied by Spouse  Belongings Taken by patient/family. Pt assessed and ready for dc. Iv dcd' instructions gone over w pt. Dc home.

## 2021-10-05 NOTE — RESPIRATORY THERAPY NOTE
Patient used  Bipap  ( spontaneous mode, no back-up rate ) for PAULA last night and this am  for a total of  5  hrs  19   min.

## 2021-10-05 NOTE — PROGRESS NOTES
Discharge Instructions provided to patient are below:    Per Dr Popeye Olivier  1. You have a suspected ulcer at the anastomosis of your stomach and small intestine (same as 2019)  2.  This will heal with pantoprazole to reduce stomach acid, sucralfate to coat the l

## 2021-10-06 RX ORDER — SODIUM CHLORIDE, SODIUM LACTATE, POTASSIUM CHLORIDE, CALCIUM CHLORIDE 600; 310; 30; 20 MG/100ML; MG/100ML; MG/100ML; MG/100ML
INJECTION, SOLUTION INTRAVENOUS CONTINUOUS
Status: CANCELLED | OUTPATIENT
Start: 2021-10-06

## 2021-10-06 RX ORDER — SODIUM CHLORIDE 9 MG/ML
INJECTION, SOLUTION INTRAVENOUS CONTINUOUS
Status: CANCELLED | OUTPATIENT
Start: 2021-10-06

## 2021-10-06 NOTE — H&P
72year old  Female with pmx of DM/ HTN/ HL/ CHF/ Morbid Obesity Admitted sec to Abn labs on routine Lab testing. Pt found to have Hb in 6 . Pt c/o worsening SOB. Denies rectal bleeding, Janice,    ROS- Neg.   .  Active Ambulatory Problems    Open wound o due to total right knee replacement, initial encounter [facility])         Date Noted: 02/22/2019      Paresthesia         Date Noted: 02/25/2021      Sensory motor neuropathy         Date Noted: 04/06/2021      Lumbosacral radiculopathy         Date Noted: 04/0 8/2000   • HERNIA SURGERY  06/2003    ventral & abdominoplasty   • OTHER SURGICAL HISTORY  06/2009    removal cement piece right knee   • OTHER SURGICAL HISTORY  10/2004    extraction of all her teeth   • OTHER SURGICAL HISTORY  08/2003 & 8/2013    open ab

## 2021-10-07 NOTE — PAYOR COMM NOTE
--------------  ADMISSION REVIEW     Payor: [facility] [place] #:  [id number]  Authorization Number: [id number]       ED Provider Notes        History   Patient presents with:  Abnormal Labs    Stated Complaint:     Subjective:   HPI COLONOSCOPY     • CYSTOSCOPY,REMV CALCULUS,SIMPLE  3/2002    kidney stone removed   • DILATION/CURETTAGE,DIAGNOSTIC  6/1997, 5/2002   • FOOT SURGERY  02/2003    Rt.  foot fx. repair   • FOOT SURGERY  09/2002    neuroma left foot   • GASTRIC BYPASS,OBESITY,S - Abnormal; Notable for the following components:    HGB 6.6 (*)     HCT 25.2 (*)     MCV 64.6 (*)     MCH 16.9 (*)     MCHC 26.2 (*)     Monocyte Absolute 1.09 (*)     All other components within normal limits   PROTHROMBIN TIME (PT) - Normal   RAPID SARS ulcer due to NSAIDs.     Recommendations:  1. IV PPI  2. Sucralfate  3. Clear liquid diet  4. EGD in AM  5. Needs to avoid NSAIDs  6. Transfuse Hgb > 7             10/5 GI    Recommendations:  1.  PPI q12 hours, continue as outpatient oral tabs, at least fo

## 2021-10-08 ENCOUNTER — HOSPITAL ENCOUNTER (OUTPATIENT)
Facility: HOSPITAL | Age: 66
Setting detail: HOSPITAL OUTPATIENT SURGERY
Discharge: HOME | End: 2021-10-08
Attending: STUDENT IN AN ORGANIZED HEALTH CARE EDUCATION/TRAINING PROGRAM | Admitting: STUDENT IN AN ORGANIZED HEALTH CARE EDUCATION/TRAINING PROGRAM
Payer: MEDICARE

## 2021-10-08 ENCOUNTER — ANESTHESIA EVENT (OUTPATIENT)
Dept: ENDOSCOPY | Facility: HOSPITAL | Age: 66
End: 2021-10-08
Payer: MEDICARE

## 2021-10-08 ENCOUNTER — ANESTHESIA (OUTPATIENT)
Dept: ENDOSCOPY | Facility: HOSPITAL | Age: 66
End: 2021-10-08
Payer: MEDICARE

## 2021-10-08 VITALS
DIASTOLIC BLOOD PRESSURE: 60 MMHG | HEART RATE: 87 BPM | HEIGHT: 64 IN | WEIGHT: 250 LBS | OXYGEN SATURATION: 97 % | RESPIRATION RATE: 20 BRPM | TEMPERATURE: 98 F | SYSTOLIC BLOOD PRESSURE: 128 MMHG | BODY MASS INDEX: 42.68 KG/M2

## 2021-10-08 DIAGNOSIS — D50.8 OTHER IRON DEFICIENCY ANEMIA: Primary | ICD-10-CM

## 2021-10-08 PROCEDURE — 82962 GLUCOSE BLOOD TEST: CPT

## 2021-10-08 PROCEDURE — 0DJ08ZZ INSPECTION OF UPPER INTESTINAL TRACT, VIA NATURAL OR ARTIFICIAL OPENING ENDOSCOPIC: ICD-10-PCS | Performed by: STUDENT IN AN ORGANIZED HEALTH CARE EDUCATION/TRAINING PROGRAM

## 2021-10-08 RX ORDER — SODIUM CHLORIDE, SODIUM LACTATE, POTASSIUM CHLORIDE, CALCIUM CHLORIDE 600; 310; 30; 20 MG/100ML; MG/100ML; MG/100ML; MG/100ML
INJECTION, SOLUTION INTRAVENOUS CONTINUOUS
Status: CANCELLED | OUTPATIENT
Start: 2021-10-08

## 2021-10-08 RX ORDER — DEXTROSE MONOHYDRATE 25 G/50ML
50 INJECTION, SOLUTION INTRAVENOUS
Status: DISCONTINUED | OUTPATIENT
Start: 2021-10-08 | End: 2021-10-08

## 2021-10-08 RX ORDER — LIDOCAINE HYDROCHLORIDE 10 MG/ML
INJECTION, SOLUTION EPIDURAL; INFILTRATION; INTRACAUDAL; PERINEURAL AS NEEDED
Status: DISCONTINUED | OUTPATIENT
Start: 2021-10-08 | End: 2021-10-08 | Stop reason: SURG

## 2021-10-08 RX ORDER — NALOXONE HYDROCHLORIDE 0.4 MG/ML
80 INJECTION, SOLUTION INTRAMUSCULAR; INTRAVENOUS; SUBCUTANEOUS AS NEEDED
Status: CANCELLED | OUTPATIENT
Start: 2021-10-08 | End: 2021-10-08

## 2021-10-08 RX ORDER — SODIUM CHLORIDE, SODIUM LACTATE, POTASSIUM CHLORIDE, CALCIUM CHLORIDE 600; 310; 30; 20 MG/100ML; MG/100ML; MG/100ML; MG/100ML
INJECTION, SOLUTION INTRAVENOUS CONTINUOUS
Status: DISCONTINUED | OUTPATIENT
Start: 2021-10-08 | End: 2021-10-08

## 2021-10-08 RX ORDER — DEXTROSE MONOHYDRATE 25 G/50ML
50 INJECTION, SOLUTION INTRAVENOUS
Status: CANCELLED | OUTPATIENT
Start: 2021-10-08

## 2021-10-08 RX ADMIN — LIDOCAINE HYDROCHLORIDE 50 MG: 10 INJECTION, SOLUTION EPIDURAL; INFILTRATION; INTRACAUDAL; PERINEURAL at 12:24:00

## 2021-10-08 NOTE — OPERATIVE REPORT
EGD operative report  Patient Name: Cottie Dakin   Date of Service: 10/8/2021  Procedure: Esophagogastroduodenoscopy   Indication: anemia  Attending: Marian Wiseman M.D.   Consent:  The risks, benefits, and alternatives were discussed with the patient / PO months  2. Repeat CBC in 1 month  3. Avoid NSAIDs  4.  Once Hgb stable, can reduce PPI to once daily, 30 min prior to breakfast    Betzy Erickson MD

## 2021-10-08 NOTE — INTERVAL H&P NOTE
Pre-op Diagnosis: Other iron deficiency anemia [D50.8]    The above referenced H&P was reviewed by Rosibel Cummings MD on 10/8/2021, the patient was examined and no significant changes have occurred in the patient's condition since the H&P was performed.

## 2021-10-08 NOTE — ANESTHESIA PREPROCEDURE EVALUATION
PRE-OP EVALUATION    Patient Name: Marquis Segal    Admit Diagnosis: Other iron deficiency anemia [D50.8]    Pre-op Diagnosis: Other iron deficiency anemia [D50.8]    ESOPHAGOGASTRODUODENOSCOPY (EGD)    Anesthesia Procedure: ESOPHAGOGASTRODUODENOSCOPY (EGD at Unknown time  Multiple Vitamins-Minerals (TAB-A-GAETANO MAXIMUM) Oral Tab, Take 1 tablet by mouth daily. , Disp: , Rfl: , Past Week at Unknown time  Potassium Chloride ER (K-DUR M20) 20 MEQ Oral Tab CR, Take 40 mEq by mouth 4 (four) times daily.   , Disp: , FOOT SURGERY  02/2003    Rt.  foot fx. repair   • FOOT SURGERY  09/2002    neuroma left foot   • GASTRIC BYPASS,OBESE<100CM BERNARDO-EN-Y  10/2000   • GASTRIC BYPASS,OBESITY,SB RECONSTRUC  8/2000   • HERNIA SURGERY  06/2003    ventral & abdominoplasty   • OTHER discussed with: patient                Present on Admission:  **None**

## 2021-10-08 NOTE — ANESTHESIA POSTPROCEDURE EVALUATION
Lanette 49 Patient Status:  Hospital Outpatient Surgery   Age/Gender 72year old female MRN [id number]   Location [id number] [place] [address] Attending tu, Jaskaran Oliva MD   Hosp Day # 0 PCP Silvana Chauhan MD

## 2021-10-12 ENCOUNTER — TELEPHONE (OUTPATIENT)
Dept: NEUROLOGY | Facility: CLINIC | Age: 66
End: 2021-10-12

## 2021-10-12 ENCOUNTER — TELEPHONE (OUTPATIENT)
Dept: SURGERY | Facility: CLINIC | Age: 66
End: 2021-10-12

## 2021-10-12 NOTE — TELEPHONE ENCOUNTER
Patient is requesting a call back with the phone number for the referred hematology office. She states she was given the number, but she cannot find it now. Please call [phone].

## 2021-10-12 NOTE — TELEPHONE ENCOUNTER
Received call from patient who stated that she is anticipating having to undergo additional infusions related to her hemoglobin issue. Patient has been advised by her hematologist to reschedule surgery to a November/December date. Called OR  Joe and discussed OR availability which will involve moving a few surgery cases to accommodate this procedure in November. GeeYuut message sent to patient to inform her that [facility] Nursing will contact her with surgery date options as soon as we are able.

## 2021-10-12 NOTE — TELEPHONE ENCOUNTER
Pt calling to reschedule her 10.27 surgery with Dr. Oxana Mendieta. She would like to push it back a month.

## 2021-10-13 NOTE — TELEPHONE ENCOUNTER
Pt's sx date and time have changed to 12/1/21 at 56    Y-Place case change request   Y-Document new sx date in [facility] date/time  Y-Update sx scheduling sheet  Y-Update Prior auth team  Y-Update flag in nursing pool  Richardson pt to inform of sx date/time change  Y- Post op appts changed  Y- MC message pt new sx instructions, post ops

## 2021-10-19 NOTE — PROGRESS NOTES
[facility] Note    Diagnosis:   Anemia  IgG Lambda MGUS    Treatment History: n/a    Visit Diagnosis:  Iron deficiency anemia due to chronic blood loss  (primary encounter diagnosis)  MGUS (monoclonal gammopathy of unknown signif Take 75 mcg by mouth before breakfast., Disp: , Rfl:   ergocalciferol 17921 units Oral Cap, Take 50,000 Units by mouth every 30 (thirty) days. , Disp: , Rfl:   Multiple Vitamins-Minerals (TAB-A-GAETANO MAXIMUM) Oral Tab, Take 1 tablet by mouth daily. , Disp: COLONOSCOPY;  Surgeon: Walter Carrasco MD;  Location: [address] [place] [address] ENDOSCOPY   • COLONOSCOPY     • Hilario Border  3/2002    kidney stone removed   • DILATION/CURETTAGE,DIAGNOSTIC  6/1997, 5/2002   • FOOT SURGERY  02/2003    Rt.  foot fx. repair (10/20 1061)     General: NAD, AOX3  HEENT: clear op, mmm, no jvd, no scleral icterus  LN: no supraclavicular, infraclavicular, axillary or inguinal LAD  CV: RRR S1S2 no murmurs  Extremities: No edema   Lungs: CTAB, no increased work of breathing  Abd: sof

## 2021-10-20 ENCOUNTER — OFFICE VISIT (OUTPATIENT)
Dept: HEMATOLOGY/ONCOLOGY | Facility: HOSPITAL | Age: 66
End: 2021-10-20
Attending: INTERNAL MEDICINE
Payer: MEDICARE

## 2021-10-20 VITALS
TEMPERATURE: 98 F | RESPIRATION RATE: 16 BRPM | SYSTOLIC BLOOD PRESSURE: 185 MMHG | WEIGHT: 254 LBS | OXYGEN SATURATION: 98 % | HEART RATE: 106 BPM | BODY MASS INDEX: 43.36 KG/M2 | DIASTOLIC BLOOD PRESSURE: 68 MMHG | HEIGHT: 64.02 IN

## 2021-10-20 DIAGNOSIS — D47.2 MGUS (MONOCLONAL GAMMOPATHY OF UNKNOWN SIGNIFICANCE): ICD-10-CM

## 2021-10-20 DIAGNOSIS — D50.0 IRON DEFICIENCY ANEMIA DUE TO CHRONIC BLOOD LOSS: Primary | ICD-10-CM

## 2021-10-20 PROCEDURE — 99205 OFFICE O/P NEW HI 60 MIN: CPT | Performed by: INTERNAL MEDICINE

## 2021-10-20 NOTE — PROGRESS NOTES
Patient presents with:  Consult      Education Record    Learner:  {LEARNER:4006}    Disease / Diagnosis:    Barriers / Limitations:  {BARRIERS TO EDUCATION:3997}   Comments:    Method:  {EDW EDUCATION ZWSD:8809}   Comments:    General Topics:  {EDW EDUC

## 2021-10-20 NOTE — PROGRESS NOTES
Patient presents with:  Consult    New patient referred by Dr. Abdiel Rhodes regarding abnormal pre op labs .   Patient states she was scheduled for lumber surgery but needed pre-op labs prior, she was sent to ER and admitted due to low hemoglobin and fatigue- per

## 2021-10-25 ENCOUNTER — TELEPHONE (OUTPATIENT)
Dept: HEMATOLOGY/ONCOLOGY | Facility: HOSPITAL | Age: 66
End: 2021-10-25

## 2021-10-25 NOTE — TELEPHONE ENCOUNTER
Patient has to have a pet scan but due to be very claustrophobic, she needs to be put under to have the pet scan, so she needs an new order submitted

## 2021-10-26 ENCOUNTER — LAB ENCOUNTER (OUTPATIENT)
Dept: LAB | Facility: HOSPITAL | Age: 66
End: 2021-10-26
Attending: INTERNAL MEDICINE
Payer: MEDICARE

## 2021-10-26 DIAGNOSIS — D50.0 IRON DEFICIENCY ANEMIA DUE TO CHRONIC BLOOD LOSS: ICD-10-CM

## 2021-10-26 DIAGNOSIS — D47.2 MGUS (MONOCLONAL GAMMOPATHY OF UNKNOWN SIGNIFICANCE): ICD-10-CM

## 2021-10-26 RX ORDER — LORAZEPAM 1 MG/1
TABLET ORAL
Qty: 2 TABLET | Refills: 0 | Status: CANCELLED | OUTPATIENT
Start: 2021-10-26

## 2021-10-26 RX ORDER — LORAZEPAM 1 MG/1
1 TABLET ORAL AS DIRECTED
Qty: 5 TABLET | Refills: 0 | Status: SHIPPED | OUTPATIENT
Start: 2021-10-26 | End: 2021-10-27 | Stop reason: ALTCHOICE

## 2021-10-26 NOTE — TELEPHONE ENCOUNTER
Yimi Lopez, RN  P [facility]  Caller: Unspecified (Yesterday,  1:35 PM)  I tried to call this gal back with no answer/no VM. We don't do PET scan's under anesthesia.  Nate Ki wanted to offer her Ativan 1mg PO 1 hour before, if that doesn't work ta

## 2021-10-26 NOTE — TELEPHONE ENCOUNTER
Harsh Aguirre RN reviewer form [employer] calling to requesting additional clinical information regarding a CPT code [phone]. Please call Harsh Aguirre back with information   Direct # [phone] and spoke to Kody Rubalcava Will who provided the needed information.      07071:Morzelized Allograft  Information needed is:   Brand name:BioDBM gel   Sonter: [employer]      Information provided to Harsh Aguirre, she will work on the authorization

## 2021-10-27 ENCOUNTER — ANESTHESIA (OUTPATIENT)
Dept: SURGERY | Facility: HOSPITAL | Age: 66
DRG: 460 | End: 2021-10-27
Payer: MEDICARE

## 2021-10-27 ENCOUNTER — OFFICE VISIT (OUTPATIENT)
Dept: HEMATOLOGY/ONCOLOGY | Facility: HOSPITAL | Age: 66
End: 2021-10-27
Attending: INTERNAL MEDICINE
Payer: MEDICARE

## 2021-10-27 VITALS
HEART RATE: 83 BPM | OXYGEN SATURATION: 97 % | TEMPERATURE: 97 F | SYSTOLIC BLOOD PRESSURE: 131 MMHG | DIASTOLIC BLOOD PRESSURE: 68 MMHG | RESPIRATION RATE: 17 BRPM

## 2021-10-27 DIAGNOSIS — D50.0 IRON DEFICIENCY ANEMIA DUE TO CHRONIC BLOOD LOSS: Primary | ICD-10-CM

## 2021-10-27 PROCEDURE — 84156 ASSAY OF PROTEIN URINE: CPT

## 2021-10-27 PROCEDURE — 84166 PROTEIN E-PHORESIS/URINE/CSF: CPT

## 2021-10-27 PROCEDURE — 96376 TX/PRO/DX INJ SAME DRUG ADON: CPT

## 2021-10-27 PROCEDURE — 86335 IMMUNFIX E-PHORSIS/URINE/CSF: CPT

## 2021-10-27 PROCEDURE — 96365 THER/PROPH/DIAG IV INF INIT: CPT

## 2021-10-31 ENCOUNTER — LAB ENCOUNTER (OUTPATIENT)
Dept: LAB | Facility: HOSPITAL | Age: 66
End: 2021-10-31
Attending: INTERNAL MEDICINE
Payer: MEDICARE

## 2021-10-31 DIAGNOSIS — D50.0 IRON DEFICIENCY ANEMIA DUE TO CHRONIC BLOOD LOSS: ICD-10-CM

## 2021-11-03 ENCOUNTER — LAB ENCOUNTER (OUTPATIENT)
Dept: LAB | Facility: HOSPITAL | Age: 66
End: 2021-11-03
Attending: RADIOLOGY
Payer: MEDICARE

## 2021-11-03 ENCOUNTER — HOSPITAL ENCOUNTER (OUTPATIENT)
Dept: CT IMAGING | Facility: HOSPITAL | Age: 66
Discharge: HOME | End: 2021-11-03
Attending: INTERNAL MEDICINE
Payer: MEDICARE

## 2021-11-03 ENCOUNTER — APPOINTMENT (OUTPATIENT)
Dept: LAB | Facility: HOSPITAL | Age: 66
End: 2021-11-03
Attending: INTERNAL MEDICINE
Payer: MEDICARE

## 2021-11-03 VITALS
HEIGHT: 64 IN | SYSTOLIC BLOOD PRESSURE: 115 MMHG | DIASTOLIC BLOOD PRESSURE: 52 MMHG | BODY MASS INDEX: 43.36 KG/M2 | OXYGEN SATURATION: 96 % | WEIGHT: 254 LBS | HEART RATE: 81 BPM | RESPIRATION RATE: 18 BRPM | TEMPERATURE: 97 F

## 2021-11-03 DIAGNOSIS — D47.2 MGUS (MONOCLONAL GAMMOPATHY OF UNKNOWN SIGNIFICANCE): ICD-10-CM

## 2021-11-03 DIAGNOSIS — D50.0 IRON DEFICIENCY ANEMIA DUE TO CHRONIC BLOOD LOSS: ICD-10-CM

## 2021-11-03 DIAGNOSIS — D50.0 IRON DEFICIENCY ANEMIA DUE TO CHRONIC BLOOD LOSS: Primary | ICD-10-CM

## 2021-11-03 PROCEDURE — 88291 CYTO/MOLECULAR REPORT: CPT

## 2021-11-03 PROCEDURE — 88237 TISSUE CULTURE BONE MARROW: CPT

## 2021-11-03 PROCEDURE — 85097 BONE MARROW INTERPRETATION: CPT | Performed by: INTERNAL MEDICINE

## 2021-11-03 PROCEDURE — 77012 CT SCAN FOR NEEDLE BIOPSY: CPT | Performed by: INTERNAL MEDICINE

## 2021-11-03 PROCEDURE — 88305 TISSUE EXAM BY PATHOLOGIST: CPT | Performed by: INTERNAL MEDICINE

## 2021-11-03 PROCEDURE — 88341 IMHCHEM/IMCYTCHM EA ADD ANTB: CPT | Performed by: INTERNAL MEDICINE

## 2021-11-03 PROCEDURE — 36415 COLL VENOUS BLD VENIPUNCTURE: CPT

## 2021-11-03 PROCEDURE — 82962 GLUCOSE BLOOD TEST: CPT

## 2021-11-03 PROCEDURE — 88342 IMHCHEM/IMCYTCHM 1ST ANTB: CPT | Performed by: INTERNAL MEDICINE

## 2021-11-03 PROCEDURE — 85610 PROTHROMBIN TIME: CPT

## 2021-11-03 PROCEDURE — 99152 MOD SED SAME PHYS/QHP 5/>YRS: CPT | Performed by: INTERNAL MEDICINE

## 2021-11-03 PROCEDURE — 38222 DX BONE MARROW BX & ASPIR: CPT | Performed by: INTERNAL MEDICINE

## 2021-11-03 PROCEDURE — 88264 CHROMOSOME ANALYSIS 20-25: CPT

## 2021-11-03 PROCEDURE — 88313 SPECIAL STAINS GROUP 2: CPT | Performed by: INTERNAL MEDICINE

## 2021-11-03 RX ORDER — NALOXONE HYDROCHLORIDE 0.4 MG/ML
80 INJECTION, SOLUTION INTRAMUSCULAR; INTRAVENOUS; SUBCUTANEOUS AS NEEDED
Status: DISCONTINUED | OUTPATIENT
Start: 2021-11-03 | End: 2021-11-05

## 2021-11-03 RX ORDER — MIDAZOLAM HYDROCHLORIDE 1 MG/ML
INJECTION INTRAMUSCULAR; INTRAVENOUS
Status: COMPLETED
Start: 2021-11-03 | End: 2021-11-03

## 2021-11-03 RX ORDER — MIDAZOLAM HYDROCHLORIDE 1 MG/ML
1 INJECTION INTRAMUSCULAR; INTRAVENOUS EVERY 5 MIN PRN
Status: ACTIVE | OUTPATIENT
Start: 2021-11-03 | End: 2021-11-03

## 2021-11-03 RX ORDER — SODIUM CHLORIDE 9 MG/ML
INJECTION, SOLUTION INTRAVENOUS CONTINUOUS
Status: DISCONTINUED | OUTPATIENT
Start: 2021-11-03 | End: 2021-11-05

## 2021-11-03 RX ORDER — FLUMAZENIL 0.1 MG/ML
0.2 INJECTION, SOLUTION INTRAVENOUS AS NEEDED
Status: DISCONTINUED | OUTPATIENT
Start: 2021-11-03 | End: 2021-11-05

## 2021-11-03 RX ADMIN — MIDAZOLAM HYDROCHLORIDE 1 MG: 1 INJECTION INTRAMUSCULAR; INTRAVENOUS at 09:25:00

## 2021-11-03 RX ADMIN — SODIUM CHLORIDE: 9 INJECTION, SOLUTION INTRAVENOUS at 09:17:00

## 2021-11-03 NOTE — TELEPHONE ENCOUNTER
Received a call from pt states that her PCP will not clear her with out cardiac clearance but cardiologist will not schedule her until receiving a fax from us informing of sx    Informed pt we will fax cardiac clearance request to Dr. Alejandra Davis. Pt very appreciative of call.      Fax sent

## 2021-11-03 NOTE — IMAGING NOTE
Patient here with her  Norma Muñoz for bone marrow aspiration/biopsy. NPO since 1900 last night. Informed consent per Dr Stephen Hui. Pt positioned prone for scan and Cpap from home w/small nasal mask used for sedation.  Pt tolerated procedure well and post vit

## 2021-11-08 ENCOUNTER — HOSPITAL ENCOUNTER (OUTPATIENT)
Dept: NUCLEAR MEDICINE | Facility: HOSPITAL | Age: 66
Discharge: HOME | End: 2021-11-08
Attending: INTERNAL MEDICINE
Payer: MEDICARE

## 2021-11-08 DIAGNOSIS — D47.2 MGUS (MONOCLONAL GAMMOPATHY OF UNKNOWN SIGNIFICANCE): ICD-10-CM

## 2021-11-08 DIAGNOSIS — D50.0 IRON DEFICIENCY ANEMIA DUE TO CHRONIC BLOOD LOSS: ICD-10-CM

## 2021-11-08 PROCEDURE — 78816 PET IMAGE W/CT FULL BODY: CPT | Performed by: INTERNAL MEDICINE

## 2021-11-08 PROCEDURE — 82962 GLUCOSE BLOOD TEST: CPT

## 2021-11-09 NOTE — TELEPHONE ENCOUNTER
Received a partial approval on 12/1/2021 surgery. Prior authorization request completed for: posterior Lumbar Lamnectomy  Authorization #[id number]  Authorization dates: 12/1/2021  CPT codes approved: 34150,96443,85062  Number of visits/dates of service approved: 1  Physician: Dr Sandhu Antis 31829 was denied as a bundled code to [phone]. I checked with  Jasson Lemos. She indicated that is correct and that code [phone] is not needed. 20930- was denied because they never received the make and manufacture of the allograft.  Sabrina Elsidebby was given information by nursing 10/26/2021 by phone)    Now only option is a written appeal per Rissa Armstrong [phone]:    Attn: Darrion Do and [address]   Fax #[phone]

## 2021-11-17 ENCOUNTER — HOSPITAL ENCOUNTER (OUTPATIENT)
Dept: LAB | Facility: HOSPITAL | Age: 66
Discharge: HOME | End: 2021-11-17
Attending: NEUROLOGICAL SURGERY
Payer: MEDICARE

## 2021-11-17 ENCOUNTER — HOSPITAL ENCOUNTER (OUTPATIENT)
Dept: PHYSICAL THERAPY | Facility: HOSPITAL | Age: 66
Discharge: HOME | End: 2021-11-17
Attending: NEUROLOGICAL SURGERY
Payer: MEDICARE

## 2021-11-17 DIAGNOSIS — M54.16 LUMBAR RADICULOPATHY: ICD-10-CM

## 2021-11-17 DIAGNOSIS — Z01.812 ENCOUNTER FOR PRE-OPERATIVE LABORATORY TESTING: ICD-10-CM

## 2021-11-17 DIAGNOSIS — M51.16 LUMBAR DISC DISEASE WITH RADICULOPATHY: ICD-10-CM

## 2021-11-17 DIAGNOSIS — M54.16 LUMBAR RADICULITIS: ICD-10-CM

## 2021-11-17 PROCEDURE — 80051 ELECTROLYTE PANEL: CPT | Performed by: NEUROLOGICAL SURGERY

## 2021-11-17 PROCEDURE — 36415 COLL VENOUS BLD VENIPUNCTURE: CPT | Performed by: NEUROLOGICAL SURGERY

## 2021-11-17 PROCEDURE — 87081 CULTURE SCREEN ONLY: CPT | Performed by: NEUROLOGICAL SURGERY

## 2021-11-17 PROCEDURE — 85730 THROMBOPLASTIN TIME PARTIAL: CPT | Performed by: NEUROLOGICAL SURGERY

## 2021-11-17 PROCEDURE — 85610 PROTHROMBIN TIME: CPT | Performed by: NEUROLOGICAL SURGERY

## 2021-11-22 ENCOUNTER — HOSPITAL ENCOUNTER (OUTPATIENT)
Dept: MRI IMAGING | Facility: HOSPITAL | Age: 66
Discharge: HOME | End: 2021-11-22
Attending: INTERNAL MEDICINE
Payer: MEDICARE

## 2021-11-22 DIAGNOSIS — M89.9 RIB LESION: ICD-10-CM

## 2021-11-22 PROCEDURE — A9575 INJ GADOTERATE MEGLUMI 0.1ML: HCPCS | Performed by: INTERNAL MEDICINE

## 2021-11-22 PROCEDURE — 71552 MRI CHEST W/O & W/DYE: CPT | Performed by: INTERNAL MEDICINE

## 2021-11-24 NOTE — TELEPHONE ENCOUNTER
EKG received:   \"Sinus rhythm, Low QRS voltage in precordial leads, borderline ECG\"    Faxed to [facility]- does not indicate cardiac clearance or denial

## 2021-11-24 NOTE — TELEPHONE ENCOUNTER
Called patient's cardiologist Dr Amairani Cramer to see if patient has received cardiac clearance for surgery on 12-1. Patient has been seen for pre-op visit. Requested OV note be faxed to our office.

## 2021-11-26 ENCOUNTER — TELEPHONE (OUTPATIENT)
Dept: SURGERY | Facility: CLINIC | Age: 66
End: 2021-11-26

## 2021-11-26 NOTE — TELEPHONE ENCOUNTER
Received Pre-Op low  Risk procedure from [facility]. Clearance is not stated but implied. Faxed to pre-admission testing for anesthesiologist review.     \"low risk surgeries do not require ECG, Cardiac testing or risk stratificatio

## 2021-11-26 NOTE — TELEPHONE ENCOUNTER
Received pre op office visit note from [address] [place] [address]. Endorsed to provider.

## 2021-11-28 ENCOUNTER — LAB ENCOUNTER (OUTPATIENT)
Dept: LAB | Facility: HOSPITAL | Age: 66
DRG: 460 | End: 2021-11-28
Attending: NEUROLOGICAL SURGERY
Payer: MEDICARE

## 2021-11-28 DIAGNOSIS — M54.16 LUMBAR RADICULITIS: ICD-10-CM

## 2021-11-28 DIAGNOSIS — D50.9 MICROCYTIC ANEMIA: ICD-10-CM

## 2021-11-28 DIAGNOSIS — D64.9 SYMPTOMATIC ANEMIA: ICD-10-CM

## 2021-11-28 PROCEDURE — 82728 ASSAY OF FERRITIN: CPT

## 2021-11-28 PROCEDURE — 83550 IRON BINDING TEST: CPT

## 2021-11-28 PROCEDURE — 85045 AUTOMATED RETICULOCYTE COUNT: CPT

## 2021-11-28 PROCEDURE — 36415 COLL VENOUS BLD VENIPUNCTURE: CPT

## 2021-11-28 PROCEDURE — 85025 COMPLETE CBC W/AUTO DIFF WBC: CPT

## 2021-11-28 PROCEDURE — 83540 ASSAY OF IRON: CPT

## 2021-11-29 RX ORDER — ACETAMINOPHEN 500 MG
1000 TABLET ORAL ONCE
Status: CANCELLED | OUTPATIENT
Start: 2021-11-29 | End: 2021-11-29

## 2021-11-29 NOTE — PROGRESS NOTES
[facility] Hematology and Oncology Clinic Note    Diagnosis:    IgG Lambda MGUS  Microcytic anemia 2/2 GI Bleed, Gastric bypass    Treatment History: n/a  1. IV InFED 10/27/21: Ferritin 8 to 52, HB 9.3 to 11.5    Visit Diagnosis:  Microcytic anemia  (primary en Rfl:   ergocalciferol 92847 units Oral Cap, Take 50,000 Units by mouth every 30 (thirty) days. , Disp: , Rfl:   Potassium Chloride ER (K-DUR M20) 20 MEQ Oral Tab CR, Take 40 mEq by mouth 4 (four) times daily.   , Disp: , Rfl:   spironolactone (ALDACTONE) 2 Unspecified disorder of thyroid    • Visual impairment     glasses     Past Surgical History:   Procedure Laterality Date   • ARTHROSCOPY OF JOINT UNLISTED Right 05/99    knee x 2   • ARTHROSCOPY OF JOINT UNLISTED Left 05/2003    knee x 2   • BACK SURGERY • Heart Disorder Mother    • Other (Other) Mother    • Diabetes Sister    • Cancer Sister    • Cancer Brother    • Colon Cancer Maternal Aunt    • Diabetes Maternal Aunt    • Colon Cancer Maternal Uncle    • Diabetes Maternal Uncle    • Cancer Brother was referred by Dr. Abdiel Rhodes for anemia. Iron Deficiency anemia   -2/2 Gastric bypass and UGIB from anastomotic ulcer.  Following with Dr. Prem Zelaya  -S/P IV InFED  -Repeat CBC from 11/28/21 showed Hb 11.5, ferritin 52  -Normal B12/Folic acid  -Repeat CBC and

## 2021-11-30 ENCOUNTER — OFFICE VISIT (OUTPATIENT)
Dept: HEMATOLOGY/ONCOLOGY | Facility: HOSPITAL | Age: 66
End: 2021-11-30
Attending: INTERNAL MEDICINE
Payer: MEDICARE

## 2021-11-30 VITALS
SYSTOLIC BLOOD PRESSURE: 178 MMHG | WEIGHT: 255 LBS | HEART RATE: 93 BPM | BODY MASS INDEX: 43.54 KG/M2 | RESPIRATION RATE: 16 BRPM | DIASTOLIC BLOOD PRESSURE: 66 MMHG | HEIGHT: 64.02 IN | TEMPERATURE: 98 F | OXYGEN SATURATION: 92 %

## 2021-11-30 DIAGNOSIS — D50.9 MICROCYTIC ANEMIA: Primary | ICD-10-CM

## 2021-11-30 DIAGNOSIS — D64.9 ANEMIA, UNSPECIFIED TYPE: ICD-10-CM

## 2021-11-30 PROCEDURE — 99213 OFFICE O/P EST LOW 20 MIN: CPT | Performed by: INTERNAL MEDICINE

## 2021-11-30 NOTE — TELEPHONE ENCOUNTER
Discussed denial of CPT code [phone] with Dr. Evita Quinonez, he stated he will plan to do surgery without. If pt does not have enough autograft he will need to use morselized allograft.      Case change request sent removing CPT code [phone]    If it is deemed necessary at time of surgery he will request they add code during surgery and will provide a letter of medical necessity post procedure

## 2021-11-30 NOTE — TELEPHONE ENCOUNTER
Called pt's Hedy's office and request that PCP clearance letter be faxed to our clinic.  states pt is clear and will fax now. Will check on status before end of shift as sx is tomorrow.

## 2021-11-30 NOTE — PROGRESS NOTES
Alie,    Your blood counts look great. I suspect the anastomosis ulcer has resolved. Do not use any NSAIDs.   You can reduce the pantoprazole to 40 mg once daily 30 min prior to breakfast for 2 additional months, then stop and monitor for any recurrent i

## 2021-11-30 NOTE — PROGRESS NOTES
Education Record    Learner:  Patient    Disease / Diagnosis:Iron deficiency anemia     Barriers / Limitations:  None   Comments:    Method:  Discussion   Comments:    General Topics:  Plan of care reviewed   Comments:    Outcome:  Shows understanding   Co

## 2021-11-30 NOTE — TELEPHONE ENCOUNTER
Received fax from Dr. House Grow office regarding Pre-Op Medical Clearance. Pt was seen in office on 11/24/21. Also received bloodwork/labwork. Endorsed to provider.

## 2021-12-01 ENCOUNTER — HOSPITAL ENCOUNTER (INPATIENT)
Facility: HOSPITAL | Age: 66
LOS: 2 days | Discharge: HOME | DRG: 460 | End: 2021-12-03
Attending: NEUROLOGICAL SURGERY | Admitting: NEUROLOGICAL SURGERY
Payer: MEDICARE

## 2021-12-01 ENCOUNTER — APPOINTMENT (OUTPATIENT)
Dept: GENERAL RADIOLOGY | Facility: HOSPITAL | Age: 66
DRG: 460 | End: 2021-12-01
Attending: NEUROLOGICAL SURGERY
Payer: MEDICARE

## 2021-12-01 ENCOUNTER — TELEPHONE (OUTPATIENT)
Dept: SURGERY | Facility: CLINIC | Age: 66
End: 2021-12-01

## 2021-12-01 DIAGNOSIS — M54.16 LUMBAR RADICULITIS: Primary | ICD-10-CM

## 2021-12-01 DIAGNOSIS — M51.16 LUMBAR DISC DISEASE WITH RADICULOPATHY: ICD-10-CM

## 2021-12-01 DIAGNOSIS — Z01.812 ENCOUNTER FOR PRE-OPERATIVE LABORATORY TESTING: ICD-10-CM

## 2021-12-01 DIAGNOSIS — M54.17 LUMBOSACRAL RADICULOPATHY: ICD-10-CM

## 2021-12-01 DIAGNOSIS — Z01.818 PREOP TESTING: ICD-10-CM

## 2021-12-01 DIAGNOSIS — Z98.1 S/P LUMBAR FUSION: ICD-10-CM

## 2021-12-01 DIAGNOSIS — M54.16 LUMBAR RADICULITIS: ICD-10-CM

## 2021-12-01 DIAGNOSIS — Z98.1 S/P LUMBAR FUSION: Primary | ICD-10-CM

## 2021-12-01 PROBLEM — Z96.651 PAIN DUE TO TOTAL RIGHT KNEE REPLACEMENT, INITIAL ENCOUNTER: Status: RESOLVED | Noted: 2019-02-22 | Resolved: 2021-12-01

## 2021-12-01 PROBLEM — E86.0 DEHYDRATION: Status: RESOLVED | Noted: 2017-08-08 | Resolved: 2021-12-01

## 2021-12-01 PROBLEM — T84.84XA PAIN DUE TO TOTAL RIGHT KNEE REPLACEMENT, INITIAL ENCOUNTER (HCC): Status: RESOLVED | Noted: 2019-02-22 | Resolved: 2021-12-01

## 2021-12-01 PROBLEM — R11.11 VOMITING WITHOUT NAUSEA, INTRACTABILITY OF VOMITING NOT SPECIFIED, UNSPECIFIED VOMITING TYPE: Status: RESOLVED | Noted: 2017-08-08 | Resolved: 2021-12-01

## 2021-12-01 PROBLEM — T84.84XA PAIN DUE TO TOTAL RIGHT KNEE REPLACEMENT, INITIAL ENCOUNTER: Status: RESOLVED | Noted: 2019-02-22 | Resolved: 2021-12-01

## 2021-12-01 PROBLEM — Z87.39 H/O ROTATOR CUFF TEAR: Status: RESOLVED | Noted: 2019-02-19 | Resolved: 2021-12-01

## 2021-12-01 PROBLEM — Z96.651 PAIN DUE TO TOTAL RIGHT KNEE REPLACEMENT, INITIAL ENCOUNTER (HCC): Status: RESOLVED | Noted: 2019-02-22 | Resolved: 2021-12-01

## 2021-12-01 PROBLEM — R07.9 CHEST PAIN, RULE OUT ACUTE MYOCARDIAL INFARCTION: Status: RESOLVED | Noted: 2017-03-14 | Resolved: 2021-12-01

## 2021-12-01 PROCEDURE — 76000 FLUOROSCOPY <1 HR PHYS/QHP: CPT | Performed by: NEUROLOGICAL SURGERY

## 2021-12-01 PROCEDURE — 84132 ASSAY OF SERUM POTASSIUM: CPT | Performed by: PHYSICIAN ASSISTANT

## 2021-12-01 PROCEDURE — 0SG00K1 FUSION OF LUMBAR VERTEBRAL JOINT WITH NONAUTOLOGOUS TISSUE SUBSTITUTE, POSTERIOR APPROACH, POSTERIOR COLUMN, OPEN APPROACH: ICD-10-PCS | Performed by: NEUROLOGICAL SURGERY

## 2021-12-01 PROCEDURE — 4A11X4G MONITORING OF PERIPHERAL NERVOUS ELECTRICAL ACTIVITY, INTRAOPERATIVE, EXTERNAL APPROACH: ICD-10-PCS | Performed by: NEUROLOGICAL SURGERY

## 2021-12-01 DEVICE — BIO DBM GEL
Type: IMPLANTABLE DEVICE | Site: BACK | Status: FUNCTIONAL
Brand: BIO DBM

## 2021-12-01 RX ORDER — TORSEMIDE 20 MG/1
20 TABLET ORAL
Status: DISCONTINUED | OUTPATIENT
Start: 2021-12-01 | End: 2021-12-03

## 2021-12-01 RX ORDER — HYDROMORPHONE HYDROCHLORIDE 1 MG/ML
0.2 INJECTION, SOLUTION INTRAMUSCULAR; INTRAVENOUS; SUBCUTANEOUS EVERY 2 HOUR PRN
Status: DISCONTINUED | OUTPATIENT
Start: 2021-12-01 | End: 2021-12-03

## 2021-12-01 RX ORDER — SCOLOPAMINE TRANSDERMAL SYSTEM 1 MG/1
PATCH, EXTENDED RELEASE TRANSDERMAL AS NEEDED
Status: DISCONTINUED | OUTPATIENT
Start: 2021-12-01 | End: 2021-12-01 | Stop reason: SURG

## 2021-12-01 RX ORDER — ONDANSETRON 2 MG/ML
INJECTION INTRAMUSCULAR; INTRAVENOUS AS NEEDED
Status: DISCONTINUED | OUTPATIENT
Start: 2021-12-01 | End: 2021-12-01 | Stop reason: SURG

## 2021-12-01 RX ORDER — DEXAMETHASONE SODIUM PHOSPHATE 4 MG/ML
VIAL (ML) INJECTION AS NEEDED
Status: DISCONTINUED | OUTPATIENT
Start: 2021-12-01 | End: 2021-12-01 | Stop reason: SURG

## 2021-12-01 RX ORDER — DIPHENHYDRAMINE HYDROCHLORIDE 50 MG/ML
12.5 INJECTION INTRAMUSCULAR; INTRAVENOUS AS NEEDED
Status: DISCONTINUED | OUTPATIENT
Start: 2021-12-01 | End: 2021-12-01 | Stop reason: HOSPADM

## 2021-12-01 RX ORDER — HYDROCODONE BITARTRATE AND ACETAMINOPHEN 10; 325 MG/1; MG/1
2 TABLET ORAL EVERY 4 HOURS PRN
Status: DISCONTINUED | OUTPATIENT
Start: 2021-12-01 | End: 2021-12-02

## 2021-12-01 RX ORDER — ONDANSETRON 2 MG/ML
4 INJECTION INTRAMUSCULAR; INTRAVENOUS EVERY 4 HOURS PRN
Status: ACTIVE | OUTPATIENT
Start: 2021-12-01 | End: 2021-12-02

## 2021-12-01 RX ORDER — LEVOTHYROXINE SODIUM 0.07 MG/1
75 TABLET ORAL
Status: DISCONTINUED | OUTPATIENT
Start: 2021-12-01 | End: 2021-12-03

## 2021-12-01 RX ORDER — PANTOPRAZOLE SODIUM 40 MG/1
40 TABLET, DELAYED RELEASE ORAL
Status: DISCONTINUED | OUTPATIENT
Start: 2021-12-02 | End: 2021-12-03

## 2021-12-01 RX ORDER — GLYCOPYRROLATE 0.2 MG/ML
INJECTION, SOLUTION INTRAMUSCULAR; INTRAVENOUS AS NEEDED
Status: DISCONTINUED | OUTPATIENT
Start: 2021-12-01 | End: 2021-12-01 | Stop reason: SURG

## 2021-12-01 RX ORDER — HYDROMORPHONE HYDROCHLORIDE 1 MG/ML
0.4 INJECTION, SOLUTION INTRAMUSCULAR; INTRAVENOUS; SUBCUTANEOUS EVERY 5 MIN PRN
Status: DISCONTINUED | OUTPATIENT
Start: 2021-12-01 | End: 2021-12-01 | Stop reason: HOSPADM

## 2021-12-01 RX ORDER — LOSARTAN POTASSIUM 25 MG/1
25 TABLET ORAL DAILY
Status: DISCONTINUED | OUTPATIENT
Start: 2021-12-01 | End: 2021-12-03

## 2021-12-01 RX ORDER — DROPERIDOL 2.5 MG/ML
INJECTION, SOLUTION INTRAMUSCULAR; INTRAVENOUS AS NEEDED
Status: DISCONTINUED | OUTPATIENT
Start: 2021-12-01 | End: 2021-12-01 | Stop reason: SURG

## 2021-12-01 RX ORDER — MEPERIDINE HYDROCHLORIDE 25 MG/ML
12.5 INJECTION INTRAMUSCULAR; INTRAVENOUS; SUBCUTANEOUS AS NEEDED
Status: DISCONTINUED | OUTPATIENT
Start: 2021-12-01 | End: 2021-12-01 | Stop reason: HOSPADM

## 2021-12-01 RX ORDER — SODIUM CHLORIDE, SODIUM LACTATE, POTASSIUM CHLORIDE, CALCIUM CHLORIDE 600; 310; 30; 20 MG/100ML; MG/100ML; MG/100ML; MG/100ML
INJECTION, SOLUTION INTRAVENOUS CONTINUOUS
Status: DISCONTINUED | OUTPATIENT
Start: 2021-12-01 | End: 2021-12-01

## 2021-12-01 RX ORDER — HYDROMORPHONE HYDROCHLORIDE 1 MG/ML
0.4 INJECTION, SOLUTION INTRAMUSCULAR; INTRAVENOUS; SUBCUTANEOUS EVERY 2 HOUR PRN
Status: DISCONTINUED | OUTPATIENT
Start: 2021-12-01 | End: 2021-12-03

## 2021-12-01 RX ORDER — CEFAZOLIN SODIUM/WATER 2 G/20 ML
2 SYRINGE (ML) INTRAVENOUS EVERY 8 HOURS
Status: COMPLETED | OUTPATIENT
Start: 2021-12-01 | End: 2021-12-02

## 2021-12-01 RX ORDER — DOCUSATE SODIUM 100 MG/1
100 CAPSULE, LIQUID FILLED ORAL 2 TIMES DAILY
Status: DISCONTINUED | OUTPATIENT
Start: 2021-12-01 | End: 2021-12-03

## 2021-12-01 RX ORDER — POLYETHYLENE GLYCOL 3350 17 G/17G
17 POWDER, FOR SOLUTION ORAL DAILY PRN
Status: DISCONTINUED | OUTPATIENT
Start: 2021-12-01 | End: 2021-12-03

## 2021-12-01 RX ORDER — HYDROMORPHONE HYDROCHLORIDE 1 MG/ML
0.8 INJECTION, SOLUTION INTRAMUSCULAR; INTRAVENOUS; SUBCUTANEOUS EVERY 2 HOUR PRN
Status: DISCONTINUED | OUTPATIENT
Start: 2021-12-01 | End: 2021-12-03

## 2021-12-01 RX ORDER — EPHEDRINE SULFATE 50 MG/ML
INJECTION INTRAVENOUS AS NEEDED
Status: DISCONTINUED | OUTPATIENT
Start: 2021-12-01 | End: 2021-12-01 | Stop reason: SURG

## 2021-12-01 RX ORDER — METOCLOPRAMIDE HYDROCHLORIDE 5 MG/ML
10 INJECTION INTRAMUSCULAR; INTRAVENOUS AS NEEDED
Status: DISCONTINUED | OUTPATIENT
Start: 2021-12-01 | End: 2021-12-01 | Stop reason: HOSPADM

## 2021-12-01 RX ORDER — ACETAMINOPHEN 325 MG/1
650 TABLET ORAL EVERY 4 HOURS PRN
Status: DISCONTINUED | OUTPATIENT
Start: 2021-12-01 | End: 2021-12-03

## 2021-12-01 RX ORDER — LABETALOL HYDROCHLORIDE 5 MG/ML
5 INJECTION, SOLUTION INTRAVENOUS EVERY 5 MIN PRN
Status: DISCONTINUED | OUTPATIENT
Start: 2021-12-01 | End: 2021-12-01 | Stop reason: HOSPADM

## 2021-12-01 RX ORDER — ACETAMINOPHEN 500 MG
1000 TABLET ORAL ONCE
COMMUNITY
End: 2021-12-03

## 2021-12-01 RX ORDER — BUPIVACAINE HYDROCHLORIDE AND EPINEPHRINE 2.5; 5 MG/ML; UG/ML
INJECTION, SOLUTION EPIDURAL; INFILTRATION; INTRACAUDAL; PERINEURAL AS NEEDED
Status: DISCONTINUED | OUTPATIENT
Start: 2021-12-01 | End: 2021-12-01 | Stop reason: HOSPADM

## 2021-12-01 RX ORDER — SENNOSIDES 8.6 MG
17.2 TABLET ORAL NIGHTLY
Status: DISCONTINUED | OUTPATIENT
Start: 2021-12-01 | End: 2021-12-03

## 2021-12-01 RX ORDER — DIAZEPAM 2 MG/1
2 TABLET ORAL EVERY 6 HOURS PRN
Status: DISCONTINUED | OUTPATIENT
Start: 2021-12-01 | End: 2021-12-03

## 2021-12-01 RX ORDER — CEFAZOLIN SODIUM/WATER 2 G/20 ML
2 SYRINGE (ML) INTRAVENOUS ONCE
Status: COMPLETED | OUTPATIENT
Start: 2021-12-01 | End: 2021-12-01

## 2021-12-01 RX ORDER — ROCURONIUM BROMIDE 10 MG/ML
INJECTION, SOLUTION INTRAVENOUS AS NEEDED
Status: DISCONTINUED | OUTPATIENT
Start: 2021-12-01 | End: 2021-12-01 | Stop reason: SURG

## 2021-12-01 RX ORDER — NALOXONE HYDROCHLORIDE 0.4 MG/ML
80 INJECTION, SOLUTION INTRAMUSCULAR; INTRAVENOUS; SUBCUTANEOUS AS NEEDED
Status: DISCONTINUED | OUTPATIENT
Start: 2021-12-01 | End: 2021-12-01 | Stop reason: HOSPADM

## 2021-12-01 RX ORDER — SODIUM CHLORIDE, SODIUM LACTATE, POTASSIUM CHLORIDE, CALCIUM CHLORIDE 600; 310; 30; 20 MG/100ML; MG/100ML; MG/100ML; MG/100ML
INJECTION, SOLUTION INTRAVENOUS CONTINUOUS
Status: DISCONTINUED | OUTPATIENT
Start: 2021-12-01 | End: 2021-12-01 | Stop reason: HOSPADM

## 2021-12-01 RX ORDER — MIDAZOLAM HYDROCHLORIDE 1 MG/ML
INJECTION INTRAMUSCULAR; INTRAVENOUS AS NEEDED
Status: DISCONTINUED | OUTPATIENT
Start: 2021-12-01 | End: 2021-12-01 | Stop reason: SURG

## 2021-12-01 RX ORDER — PROCHLORPERAZINE EDISYLATE 5 MG/ML
10 INJECTION INTRAMUSCULAR; INTRAVENOUS EVERY 6 HOURS PRN
Status: ACTIVE | OUTPATIENT
Start: 2021-12-01 | End: 2021-12-03

## 2021-12-01 RX ORDER — ATORVASTATIN CALCIUM 20 MG/1
20 TABLET, FILM COATED ORAL NIGHTLY
Status: DISCONTINUED | OUTPATIENT
Start: 2021-12-01 | End: 2021-12-03

## 2021-12-01 RX ORDER — HYDROCODONE BITARTRATE AND ACETAMINOPHEN 10; 325 MG/1; MG/1
1 TABLET ORAL EVERY 4 HOURS PRN
Status: DISCONTINUED | OUTPATIENT
Start: 2021-12-01 | End: 2021-12-02

## 2021-12-01 RX ORDER — DIPHENHYDRAMINE HCL 25 MG
25 CAPSULE ORAL EVERY 4 HOURS PRN
Status: DISCONTINUED | OUTPATIENT
Start: 2021-12-01 | End: 2021-12-03

## 2021-12-01 RX ORDER — ACETAMINOPHEN 10 MG/ML
INJECTION, SOLUTION INTRAVENOUS AS NEEDED
Status: DISCONTINUED | OUTPATIENT
Start: 2021-12-01 | End: 2021-12-01 | Stop reason: SURG

## 2021-12-01 RX ORDER — HYDROMORPHONE HYDROCHLORIDE 1 MG/ML
INJECTION, SOLUTION INTRAMUSCULAR; INTRAVENOUS; SUBCUTANEOUS
Status: COMPLETED
Start: 2021-12-01 | End: 2021-12-01

## 2021-12-01 RX ORDER — MIDAZOLAM HYDROCHLORIDE 1 MG/ML
1 INJECTION INTRAMUSCULAR; INTRAVENOUS EVERY 5 MIN PRN
Status: DISCONTINUED | OUTPATIENT
Start: 2021-12-01 | End: 2021-12-01 | Stop reason: HOSPADM

## 2021-12-01 RX ORDER — POTASSIUM CHLORIDE 20 MEQ/1
40 TABLET, EXTENDED RELEASE ORAL 4 TIMES DAILY
Status: DISCONTINUED | OUTPATIENT
Start: 2021-12-01 | End: 2021-12-03

## 2021-12-01 RX ORDER — DIPHENHYDRAMINE HYDROCHLORIDE 50 MG/ML
25 INJECTION INTRAMUSCULAR; INTRAVENOUS EVERY 4 HOURS PRN
Status: DISCONTINUED | OUTPATIENT
Start: 2021-12-01 | End: 2021-12-03

## 2021-12-01 RX ORDER — CYCLOBENZAPRINE HCL 5 MG
2.5 TABLET ORAL EVERY 6 HOURS PRN
Status: DISCONTINUED | OUTPATIENT
Start: 2021-12-01 | End: 2021-12-03

## 2021-12-01 RX ORDER — LIDOCAINE HYDROCHLORIDE 10 MG/ML
INJECTION, SOLUTION EPIDURAL; INFILTRATION; INTRACAUDAL; PERINEURAL AS NEEDED
Status: DISCONTINUED | OUTPATIENT
Start: 2021-12-01 | End: 2021-12-01 | Stop reason: SURG

## 2021-12-01 RX ORDER — BISACODYL 10 MG
10 SUPPOSITORY, RECTAL RECTAL
Status: DISCONTINUED | OUTPATIENT
Start: 2021-12-01 | End: 2021-12-03

## 2021-12-01 RX ORDER — MIRABEGRON 50 MG/1
50 TABLET, FILM COATED, EXTENDED RELEASE ORAL DAILY
COMMUNITY

## 2021-12-01 RX ORDER — SODIUM PHOSPHATE, DIBASIC AND SODIUM PHOSPHATE, MONOBASIC 7; 19 G/133ML; G/133ML
1 ENEMA RECTAL ONCE AS NEEDED
Status: DISCONTINUED | OUTPATIENT
Start: 2021-12-01 | End: 2021-12-03

## 2021-12-01 RX ORDER — ONDANSETRON 2 MG/ML
4 INJECTION INTRAMUSCULAR; INTRAVENOUS AS NEEDED
Status: DISCONTINUED | OUTPATIENT
Start: 2021-12-01 | End: 2021-12-01 | Stop reason: HOSPADM

## 2021-12-01 RX ORDER — SPIRONOLACTONE 25 MG/1
25 TABLET ORAL 2 TIMES DAILY
Status: DISCONTINUED | OUTPATIENT
Start: 2021-12-01 | End: 2021-12-03

## 2021-12-01 RX ADMIN — DROPERIDOL 0.62 MG: 2.5 INJECTION, SOLUTION INTRAMUSCULAR; INTRAVENOUS at 07:41:00

## 2021-12-01 RX ADMIN — LIDOCAINE HYDROCHLORIDE 100 MG: 10 INJECTION, SOLUTION EPIDURAL; INFILTRATION; INTRACAUDAL; PERINEURAL at 07:43:00

## 2021-12-01 RX ADMIN — DEXAMETHASONE SODIUM PHOSPHATE 8 MG: 4 MG/ML VIAL (ML) INJECTION at 07:44:00

## 2021-12-01 RX ADMIN — ROCURONIUM BROMIDE 50 MG: 10 INJECTION, SOLUTION INTRAVENOUS at 07:43:00

## 2021-12-01 RX ADMIN — EPHEDRINE SULFATE 5 MG: 50 INJECTION INTRAVENOUS at 08:58:00

## 2021-12-01 RX ADMIN — GLYCOPYRROLATE 0.3 MG: 0.2 INJECTION, SOLUTION INTRAMUSCULAR; INTRAVENOUS at 07:47:00

## 2021-12-01 RX ADMIN — ACETAMINOPHEN 1000 MG: 10 INJECTION, SOLUTION INTRAVENOUS at 09:18:00

## 2021-12-01 RX ADMIN — ROCURONIUM BROMIDE 30 MG: 10 INJECTION, SOLUTION INTRAVENOUS at 08:30:00

## 2021-12-01 RX ADMIN — SCOLOPAMINE TRANSDERMAL SYSTEM 1 PATCH: 1 PATCH, EXTENDED RELEASE TRANSDERMAL at 07:44:00

## 2021-12-01 RX ADMIN — CEFAZOLIN SODIUM/WATER 2 G: 2 G/20 ML SYRINGE (ML) INTRAVENOUS at 07:57:00

## 2021-12-01 RX ADMIN — SODIUM CHLORIDE, SODIUM LACTATE, POTASSIUM CHLORIDE, CALCIUM CHLORIDE: 600; 310; 30; 20 INJECTION, SOLUTION INTRAVENOUS at 07:37:00

## 2021-12-01 RX ADMIN — MIDAZOLAM HYDROCHLORIDE 2 MG: 1 INJECTION INTRAMUSCULAR; INTRAVENOUS at 07:38:00

## 2021-12-01 RX ADMIN — ONDANSETRON 4 MG: 2 INJECTION INTRAMUSCULAR; INTRAVENOUS at 09:17:00

## 2021-12-01 NOTE — TELEPHONE ENCOUNTER
Order placed, printed, and placed in [place] bin for review and signature    Routed to provider to inform

## 2021-12-01 NOTE — OPERATIVE REPORT
Operative Note    Patient Name: Julia Flynn    Preoperative Diagnosis: S/P lumbar fusion [Z98.1]  Lumbar radiculitis [M54.16]    Postoperative Diagnosis: same    Primary Surgeon: Marco Gallo MD    Assistant: Pascale Garnett pa-c who was essential the then closed in layers. The patient was then taken off the OR table. Patient awakened taken recovery.     Dictated but not proofread

## 2021-12-01 NOTE — BRIEF OP NOTE
Pre-Operative Diagnosis: S/P lumbar fusion [Z98.1]  Lumbar radiculitis [M54.16]     Post-Operative Diagnosis: S/P lumbar fusion [R65. 1]Lumbar radiculitis [M54.16]      Procedure Performed:   EXPLORATION OF HARDWARE AT LUMBAR 4/ LUMBAR 5, EXPLORATION OF FUS

## 2021-12-01 NOTE — ANESTHESIA PREPROCEDURE EVALUATION
PRE-OP EVALUATION    Patient Name: Vickye Dubin    Admit Diagnosis: S/P lumbar fusion [Z98.1]  Lumbar radiculitis [M54.16]    Pre-op Diagnosis: S/P lumbar fusion [Z98.1]  Lumbar radiculitis [M54.16]    EXPLORATION OF HARDWARE AT LUMBAR 4/ LUMBAR 5, EXPLOR by mouth 2 (two) times daily. , Disp: , Rfl: , 11/30/2021 at 1900  torsemide (DEMADEX) 20 MG Oral Tab, Take 20 mg by mouth 2 (two) times daily. , Disp: , Rfl: , 11/30/2021 at 1900  Losartan Potassium (COZAAR) 25 MG Oral Tab, Take 25 mg by mouth daily. , D neuroma left foot   • GASTRIC BYPASS,OBESE<100CM BERNARDO-EN-Y  10/2000   • HERNIA SURGERY  06/2003    ventral & abdominoplasty   • OTHER SURGICAL HISTORY  06/2009    removal cement piece right knee   • OTHER SURGICAL HISTORY  10/2004    extraction of all her normal.  Breath sounds clear to auscultation bilaterally. (-) wheezes       Other findings            ASA: 3   Plan: general  NPO status verified and patient meets guidelines. Patient has not taken beta blockers in last 24 hours.         Plan/risks

## 2021-12-01 NOTE — ANESTHESIA PROCEDURE NOTES
Airway  Date/Time: 12/1/2021 7:43 AM  Urgency: elective      General Information and Staff    Patient location during procedure: OR  Anesthesiologist: Marya Stevenson MD  Performed: anesthesiologist     Indications and Patient Condition  Indications for

## 2021-12-01 NOTE — ANESTHESIA POSTPROCEDURE EVALUATION
Lanette 49 Patient Status:  Inpatient   Age/Gender 72year old female MRN [id number]   Location [facility] Attending Darryle Gross, MD   Hosp Day # 0 PCP Kalee Brewer MD       Anesthesia Po

## 2021-12-01 NOTE — PROGRESS NOTES
Patient admitted in stable condition from PACU this afternoon. AOx4, drowsy but easily arousable. On 3L O2, . Hx PAULA, uses BIPAP at home. Pt c/o back pain, controlled with PRN Dilaudid. Up with standby assist, pt has voided since surgery.  IV abx as orde

## 2021-12-02 PROCEDURE — 97530 THERAPEUTIC ACTIVITIES: CPT

## 2021-12-02 PROCEDURE — 97535 SELF CARE MNGMENT TRAINING: CPT

## 2021-12-02 PROCEDURE — 82962 GLUCOSE BLOOD TEST: CPT

## 2021-12-02 PROCEDURE — 97161 PT EVAL LOW COMPLEX 20 MIN: CPT

## 2021-12-02 PROCEDURE — 80053 COMPREHEN METABOLIC PANEL: CPT | Performed by: INTERNAL MEDICINE

## 2021-12-02 PROCEDURE — 85025 COMPLETE CBC W/AUTO DIFF WBC: CPT | Performed by: INTERNAL MEDICINE

## 2021-12-02 PROCEDURE — 97165 OT EVAL LOW COMPLEX 30 MIN: CPT

## 2021-12-02 PROCEDURE — 97116 GAIT TRAINING THERAPY: CPT

## 2021-12-02 RX ORDER — OXYCODONE HYDROCHLORIDE AND ACETAMINOPHEN 5; 325 MG/1; MG/1
1 TABLET ORAL EVERY 4 HOURS PRN
Status: DISCONTINUED | OUTPATIENT
Start: 2021-12-02 | End: 2021-12-03

## 2021-12-02 RX ORDER — OXYCODONE HYDROCHLORIDE AND ACETAMINOPHEN 5; 325 MG/1; MG/1
2 TABLET ORAL EVERY 4 HOURS PRN
Status: DISCONTINUED | OUTPATIENT
Start: 2021-12-02 | End: 2021-12-03

## 2021-12-02 RX ORDER — HYDROCODONE BITARTRATE AND ACETAMINOPHEN 10; 325 MG/1; MG/1
TABLET ORAL
Status: COMPLETED
Start: 2021-12-02 | End: 2021-12-02

## 2021-12-02 NOTE — TELEPHONE ENCOUNTER
Received DME orders from provider for bone growth stimulator.   Patient face sheet, insurance information, and LOV notes printed and faxed to [address] [place] [address], fax number:    [phone]

## 2021-12-02 NOTE — PLAN OF CARE
Back incision open to air, with skin glue clean, dry, intact. Denies numbness, tingling. Moves all extremities well. Has ambulated in hallway. States pain controlled with iv and oral pain medication. 02 sat 90-95% on room air.   Instructed on plan of c

## 2021-12-02 NOTE — PROGRESS NOTES
Received Pt A&OX4. VSS, on 3L for hx PAULA. Tele, NSR/ST. Voids, tolerating diet. Pt c/o 7/10 back pain, prn pain meds per MAR. Qh neuro checks. Pt's dtr updated on poc via phone call. Verbalized understanding of poc wctm hourly.

## 2021-12-02 NOTE — PLAN OF CARE
Chest, face red & itching. Skin intact, no rash. Benadryl given. Will cont to monitor. Instructed to call if further discomfort felt. Verbalized understanding.

## 2021-12-02 NOTE — PROGRESS NOTES
[facility]  Neurosurgery Progress Note    Ross Ortiz Patient Status:  Inpatient    1955 MRN [id number]   [facility] 2SW-S Attending Todd Coles MD   Hosp Day # 1 PCP Benny Stovall MD     Chief Complaint:  No ch

## 2021-12-02 NOTE — PROGRESS NOTES
and daughter at bedside. Reviewed indications, side effects of pain medication/narcotics and constipation prevention.  Stressed importance of increased fluids/roughage in diet, continued use stool softeners along with laxatives and suppositories as

## 2021-12-02 NOTE — OCCUPATIONAL THERAPY NOTE
OCCUPATIONAL THERAPY EVALUATION - INPATIENT    Room Number: 6303/3806-Z  Evaluation Date: 12/2/2021     Type of Evaluation: Initial  Presenting Problem: L4-L5 fusion    Physician Order: IP Consult to Occupational Therapy  Reason for Therapy:  ADL/IADL Dysf shower  Other Equipment: None    Occupation/Status: retired  Hand Dominance: Right  Drives: Yes  Patient Regularly Uses: Reading glasses    Prior Level of Function: Pt typically independent with ADLs and mobility. Pt does not use AD.     SUBJECTIVE   Pt sta

## 2021-12-02 NOTE — PROGRESS NOTES
72year old female with pmx of HTN, HL, DM-2, CHF, Hy[oklaemia. Anemia, Morbid obesity admitted following spinal Fusion by Dr Martha Knight. Pain better controlled in lower back with pian Meds.     Active Ambulatory Problems    Spondylisthesis         Date Note 05/05/2015      Small bowel ischemia ([facility] [address]         Date Noted: 11/30/2015      Abdominal pain         Date Noted: 12/02/2015      Chest pain, rule out acute myocardial infarction         Date Noted: 03/14/2017      Vomiting without nausea, intractability o Dilation/curettage,diagnostic  6/1997, 5/2002   • Foot surgery  02/2003    Rt.  foot fx. repair   • Foot surgery  09/2002    neuroma left foot   • Gastric bypass,obese<100cm cathy-en-y  10/2000   • Hernia surgery  06/2003    ventral & abdominoplasty   • Othe

## 2021-12-02 NOTE — PLAN OF CARE
Problem: Patient/Family Goals  Goal: Patient/Family Long Term Goal  Description: Patient's Long Term Goal: To discharge home with adequate resources     Interventions:  - comply with poc  -[place]  - See additional Care Plan goals for specific interventi

## 2021-12-03 VITALS
RESPIRATION RATE: 16 BRPM | WEIGHT: 251 LBS | OXYGEN SATURATION: 97 % | TEMPERATURE: 99 F | DIASTOLIC BLOOD PRESSURE: 57 MMHG | HEART RATE: 88 BPM | HEIGHT: 64 IN | SYSTOLIC BLOOD PRESSURE: 134 MMHG | BODY MASS INDEX: 42.85 KG/M2

## 2021-12-03 PROCEDURE — 80048 BASIC METABOLIC PNL TOTAL CA: CPT | Performed by: INTERNAL MEDICINE

## 2021-12-03 PROCEDURE — 82962 GLUCOSE BLOOD TEST: CPT

## 2021-12-03 RX ORDER — CYCLOBENZAPRINE HCL 5 MG
2.5 TABLET ORAL 3 TIMES DAILY PRN
Qty: 45 TABLET | Refills: 0 | Status: SHIPPED | OUTPATIENT
Start: 2021-12-03 | End: 2021-12-30

## 2021-12-03 RX ORDER — OXYCODONE HYDROCHLORIDE AND ACETAMINOPHEN 5; 325 MG/1; MG/1
1-2 TABLET ORAL EVERY 4 HOURS PRN
Qty: 60 TABLET | Refills: 0 | Status: SHIPPED | OUTPATIENT
Start: 2021-12-03 | End: 2021-12-08

## 2021-12-03 NOTE — PLAN OF CARE
Assumed care of patient at 0700. A/o x3   Denies Chest pain, sob, Lightheadedness, dizziness. Up and walking around independently. Pain is rated at 7/10. Oxycodone Q4hr.        Problem: Patient/Family Goals  Goal: Patient/Family Long Term Goal  [place]

## 2021-12-03 NOTE — PROGRESS NOTES
Pt seen and examined> c/o Rash on  Neck area. Pain better with Percocet/ Dilaudid as needed. O/E       12/02/21  1638   BP: 133/61   Pulse: 98   Resp: 16   Temp: 98.5 °F (36.9 °C)           O/E.    HEENT- No pallor, no icterus.   Neck- james

## 2021-12-03 NOTE — DISCHARGE SUMMARY
[facility]  Discharge Summary    Hyun Zepeda Patient Status:  Inpatient    1955 MRN [id number]   [facility] 2SW-S Attending Lisa William MD   Hosp Day # 2 PCP Ramos Nugent MD     Date of Admission: 2021 delayed discharge. She was cleared by PT/OT for home dc. She will discharge home this afternoon now that pain is better controlled.     Complications: None    Discharge Condition: Good    Disposition: Home or Self Care    Discharge Medications: Current [facility] [place])        Jan 13, 2022 11:00 AM CST Post Op Visit with Patrica Gilliland MD [address] [place] [address] ([facility])        Apr 20, 2022  9:40 AM CDT Follow Up Visit with Philmore Dakin, NP

## 2021-12-03 NOTE — PROGRESS NOTES
[facility]  Neurosurgery Progress Note    Zane Bring Patient Status:  Inpatient    1955 MRN [id number]   [facility] 2SW-S Attending Sean Schuler MD   Hosp Day # 2 PCP Silvana Chauhan MD     Chief Complaint:  No ch

## 2021-12-06 NOTE — PAYOR COMM NOTE
--------------  DISCHARGE REVIEW    Payor: [place] [employer] [place] #:  [id number]  Authorization Number: [id number]    Admit date: 12/1/21  Admit time:   5:33 AM  Discharge Date: 12/3/2021  3:26 PM     Admitting Physician: Kelly West Admission: Lumbar radiculitis    Procedures: EXPLORATION OF HARDWARE AT LUMBAR 4/ LUMBAR 5, EXPLORATION OF FUSION, ARTHRODESIS OF LUMBAR 4/LUMBAR 5, ALLOGRAFT     History of Present Illness: Jayde Gayatri Arceo is a(n) 72year old female here for follow-up  She Oral Tab CR  Take 40 mEq by mouth 4 (four) times daily. spironolactone (ALDACTONE) 25 MG Oral Tab  Take 25 mg by mouth 2 (two) times daily. torsemide (DEMADEX) 20 MG Oral Tab  Take 20 mg by mouth 2 (two) times daily.       Losartan Potassium (COZA ED should you develop fever, shortness of breath, chest pain, or leg swelling    DARVIN Grant  12/3/2021  1:05 PM      Electronically signed by DARVIN Mayorga on 12/3/2021  1:10 PM         REVIEWER COMMENTS

## 2021-12-08 RX ORDER — OXYCODONE HYDROCHLORIDE AND ACETAMINOPHEN 5; 325 MG/1; MG/1
1-2 TABLET ORAL EVERY 4 HOURS PRN
Qty: 60 TABLET | Refills: 0 | Status: SHIPPED | OUTPATIENT
Start: 2021-12-08 | End: 2021-12-30

## 2021-12-08 NOTE — TELEPHONE ENCOUNTER
Patient calling to request refill of oxyCODONE-acetaminophen [facility] Oral Tab  Pharmacy [place] [address] [address] [facility], [address] [place] [address] [place], [phone], [phone]    Patient informed of 48 hour refill policy excl

## 2021-12-08 NOTE — TELEPHONE ENCOUNTER
Medication: percocet 5-325 take 1-2 q 4 prn #60    Date of last refill: 12-3-21  [place] 67-1-43    Last office visit: surgery 12-1-21  Due back to clinic per last office note:  2 week post op  Date next office visit adilson

## 2021-12-16 ENCOUNTER — OFFICE VISIT (OUTPATIENT)
Dept: SURGERY | Facility: CLINIC | Age: 66
End: 2021-12-16
Payer: COMMERCIAL

## 2021-12-16 ENCOUNTER — TELEPHONE (OUTPATIENT)
Dept: SURGERY | Facility: CLINIC | Age: 66
End: 2021-12-16

## 2021-12-16 VITALS
BODY MASS INDEX: 42.85 KG/M2 | HEIGHT: 64 IN | DIASTOLIC BLOOD PRESSURE: 84 MMHG | SYSTOLIC BLOOD PRESSURE: 128 MMHG | WEIGHT: 251 LBS

## 2021-12-16 DIAGNOSIS — Z98.1 S/P LUMBAR FUSION: Primary | ICD-10-CM

## 2021-12-16 PROCEDURE — 1111F DSCHRG MED/CURRENT MED MERGE: CPT | Performed by: PHYSICIAN ASSISTANT

## 2021-12-16 PROCEDURE — 3008F BODY MASS INDEX DOCD: CPT | Performed by: PHYSICIAN ASSISTANT

## 2021-12-16 PROCEDURE — 3074F SYST BP LT 130 MM HG: CPT | Performed by: PHYSICIAN ASSISTANT

## 2021-12-16 PROCEDURE — 99024 POSTOP FOLLOW-UP VISIT: CPT | Performed by: PHYSICIAN ASSISTANT

## 2021-12-16 PROCEDURE — 3079F DIAST BP 80-89 MM HG: CPT | Performed by: PHYSICIAN ASSISTANT

## 2021-12-16 RX ORDER — CEPHALEXIN 500 MG/1
500 CAPSULE ORAL 3 TIMES DAILY
Qty: 30 CAPSULE | Refills: 0 | Status: SHIPPED | OUTPATIENT
Start: 2021-12-16 | End: 2021-12-16 | Stop reason: CLARIF

## 2021-12-16 RX ORDER — CYCLOBENZAPRINE HCL 10 MG
10 TABLET ORAL 3 TIMES DAILY PRN
Qty: 30 TABLET | Refills: 0 | Status: SHIPPED | OUTPATIENT
Start: 2021-12-16 | End: 2021-12-30

## 2021-12-16 RX ORDER — CEPHALEXIN 500 MG/1
500 CAPSULE ORAL 3 TIMES DAILY
Qty: 30 CAPSULE | Refills: 0 | Status: SHIPPED | OUTPATIENT
Start: 2021-12-16 | End: 2022-01-13

## 2021-12-16 RX ORDER — OXYCODONE AND ACETAMINOPHEN 10; 325 MG/1; MG/1
1-2 TABLET ORAL EVERY 4 HOURS PRN
Qty: 90 TABLET | Refills: 0 | Status: SHIPPED | OUTPATIENT
Start: 2021-12-16 | End: 2021-12-17

## 2021-12-16 RX ORDER — NALOXONE HYDROCHLORIDE 4 MG/.1ML
4 SPRAY, METERED NASAL AS NEEDED
Qty: 1 KIT | Refills: 0 | Status: SHIPPED | OUTPATIENT
Start: 2021-12-16 | End: 2022-01-13

## 2021-12-16 NOTE — TELEPHONE ENCOUNTER
Pt states pharmacy won't fill oxyCODONE-acetaminophen  MG Oral Tab or cephalexin 500 MG Oral Cap without our office contacting them.       Pls call [facility], [address] [place] [address], [phone]

## 2021-12-16 NOTE — PROGRESS NOTES
Currently in a lot of pain  A little worse than before sx  Is using bone growth stimulator at home  Advised that it might increase the pain and she said this is the case

## 2021-12-16 NOTE — PROGRESS NOTES
Neurosurgery Clinic Visit  2021    Tyra Talavera PCP:  Femi Ham MD    1955 MRN [id number]     CC:  S/P L4-5 fusion exploration and revision with bone graft    HPI:    Paul Grullon is here for 2 weeks post op.   She is taking percocet 5 e since the study of 2018.       2. Mild degenerative disc disease at L5-S1.        CT Abd/Pelvis 2020 -     R L5 screw is lateral to the vertebral body  No solid bony fusion mass through the disc space          Past Medical History:   Diagnosis Date   • Abd Diabetes Maternal Uncle     • Cancer Brother        ROS:  A 10-point system was reviewed. Pertinent positives and negatives are noted in HPI. Physical Exam:   12/16/21  1109   BP: 128/84   Body mass index is 40.34 kg/m².   General: No Apparent Distress,

## 2021-12-17 RX ORDER — OXYCODONE AND ACETAMINOPHEN 10; 325 MG/1; MG/1
1-2 TABLET ORAL EVERY 4 HOURS PRN
Qty: 90 TABLET | Refills: 0 | Status: SHIPPED | OUTPATIENT
Start: 2021-12-17 | End: 2021-12-30

## 2021-12-17 NOTE — TELEPHONE ENCOUNTER
Called [facility] in [place]. Cephalaxin was received electronically but error was stating it was too soon to fill. Explained that patient had never been prescribed it before.   They were able to see that  Cephalexin was filled at the [facility] in [facility]

## 2021-12-20 NOTE — TELEPHONE ENCOUNTER
Received fax from pharmacy stating that pt has morphine allergy on file. Gerard Walker ok for them to fill? ?    Per her chart, shows that her rxn is migraine

## 2021-12-30 ENCOUNTER — OFFICE VISIT (OUTPATIENT)
Dept: SURGERY | Facility: CLINIC | Age: 66
End: 2021-12-30
Payer: COMMERCIAL

## 2021-12-30 VITALS
BODY MASS INDEX: 43 KG/M2 | SYSTOLIC BLOOD PRESSURE: 130 MMHG | DIASTOLIC BLOOD PRESSURE: 80 MMHG | HEART RATE: 80 BPM | WEIGHT: 251 LBS | OXYGEN SATURATION: 97 %

## 2021-12-30 DIAGNOSIS — G89.29 CHRONIC BILATERAL LOW BACK PAIN WITHOUT SCIATICA: ICD-10-CM

## 2021-12-30 DIAGNOSIS — M54.50 CHRONIC BILATERAL LOW BACK PAIN WITHOUT SCIATICA: ICD-10-CM

## 2021-12-30 DIAGNOSIS — Z98.1 S/P LUMBAR FUSION: Primary | ICD-10-CM

## 2021-12-30 PROCEDURE — 3075F SYST BP GE 130 - 139MM HG: CPT | Performed by: PHYSICIAN ASSISTANT

## 2021-12-30 PROCEDURE — 3079F DIAST BP 80-89 MM HG: CPT | Performed by: PHYSICIAN ASSISTANT

## 2021-12-30 PROCEDURE — 99024 POSTOP FOLLOW-UP VISIT: CPT | Performed by: PHYSICIAN ASSISTANT

## 2021-12-30 RX ORDER — CYCLOBENZAPRINE HCL 10 MG
10 TABLET ORAL 3 TIMES DAILY PRN
Qty: 60 TABLET | Refills: 0 | Status: SHIPPED | OUTPATIENT
Start: 2021-12-30 | End: 2022-01-13

## 2021-12-30 RX ORDER — OXYCODONE AND ACETAMINOPHEN 10; 325 MG/1; MG/1
1-2 TABLET ORAL EVERY 8 HOURS PRN
Qty: 90 TABLET | Refills: 0 | Status: SHIPPED | OUTPATIENT
Start: 2021-12-30 | End: 2022-01-13

## 2021-12-30 NOTE — PROGRESS NOTES
Patient presents to our office for her post op of EXPLORATION OF HARDWARE AT LUMBAR 4/ LUMBAR 5, EXPLORATION OF FUSION, ARTHRODESIS OF LUMBAR 4/LUMBAR 5, ALLOGRAFT done on 12/01/21. Pain is 7/10.  Depending on the day some days she feels great other days no

## 2021-12-30 NOTE — PROGRESS NOTES
Neurosurgery Clinic Visit  2021    Jason Lai PCP:  Zahra Garcia MD    1955 MRN [id number]     CC:  S/P L4-5 fusion exploration and revision with bone graft    HPI:    David Fiorella is here for wound check. She tolerated abx well.   She d -      1.  There is been a prior discectomy and posterior fusion at L4-5 with residual disc bulge/osteophyte complex posterior laterally causing moderate bilateral neural foraminal narrowing, slightly worse on the left than the right.  There is overall no Disorder Father     • Hypertension Mother     • Diabetes Mother     • Heart Disorder Mother     • Other (Other) Mother     • Diabetes Sister     • Cancer Sister     • Cancer Brother     • Colon Cancer Maternal Aunt     • Diabetes Maternal Aunt     • Colon

## 2022-01-12 ENCOUNTER — HOSPITAL ENCOUNTER (OUTPATIENT)
Dept: GENERAL RADIOLOGY | Age: 67
Discharge: HOME | End: 2022-01-12
Attending: PHYSICIAN ASSISTANT
Payer: MEDICARE

## 2022-01-12 DIAGNOSIS — Z98.1 S/P LUMBAR FUSION: ICD-10-CM

## 2022-01-12 DIAGNOSIS — G89.29 CHRONIC BILATERAL LOW BACK PAIN WITHOUT SCIATICA: ICD-10-CM

## 2022-01-12 DIAGNOSIS — M54.50 CHRONIC BILATERAL LOW BACK PAIN WITHOUT SCIATICA: ICD-10-CM

## 2022-01-12 PROCEDURE — 72100 X-RAY EXAM L-S SPINE 2/3 VWS: CPT | Performed by: PHYSICIAN ASSISTANT

## 2022-01-13 ENCOUNTER — OFFICE VISIT (OUTPATIENT)
Dept: SURGERY | Facility: CLINIC | Age: 67
End: 2022-01-13
Payer: COMMERCIAL

## 2022-01-13 VITALS — HEART RATE: 80 BPM | DIASTOLIC BLOOD PRESSURE: 82 MMHG | SYSTOLIC BLOOD PRESSURE: 130 MMHG

## 2022-01-13 DIAGNOSIS — Z98.1 S/P LUMBAR FUSION: Primary | ICD-10-CM

## 2022-01-13 PROCEDURE — 3075F SYST BP GE 130 - 139MM HG: CPT | Performed by: NEUROLOGICAL SURGERY

## 2022-01-13 PROCEDURE — 99024 POSTOP FOLLOW-UP VISIT: CPT | Performed by: NEUROLOGICAL SURGERY

## 2022-01-13 PROCEDURE — 3079F DIAST BP 80-89 MM HG: CPT | Performed by: NEUROLOGICAL SURGERY

## 2022-01-13 RX ORDER — CYCLOBENZAPRINE HCL 10 MG
10 TABLET ORAL 3 TIMES DAILY PRN
Qty: 60 TABLET | Refills: 0 | Status: SHIPPED | OUTPATIENT
Start: 2022-01-13

## 2022-01-13 RX ORDER — HYDROCODONE BITARTRATE AND ACETAMINOPHEN 10; 325 MG/1; MG/1
TABLET ORAL
COMMUNITY
Start: 2021-11-24

## 2022-01-13 RX ORDER — OXYCODONE AND ACETAMINOPHEN 10; 325 MG/1; MG/1
1-2 TABLET ORAL EVERY 8 HOURS PRN
Qty: 90 TABLET | Refills: 0 | Status: SHIPPED | OUTPATIENT
Start: 2022-01-13 | End: 2022-01-31

## 2022-01-13 NOTE — PROGRESS NOTES
Pt is being seen today for a 6 post op sx 12. 1.21   EXPLORATION OF HARDWARE AT LUMBAR 4/ LUMBAR 5, EXPLORATION OF FUSION, ARTHRODESIS OF LUMBAR 4/LUMBAR 5, ALLOGRAFT        Pt states she doing good since the sx.  No concern

## 2022-01-13 NOTE — PROGRESS NOTES
Neurosurgery Clinic Visit  2022    Glenna Ruben PCP:  Riri Guzmán MD    1955 MRN [id number]     HISTORY OF PRESENT ILLNESS:  Glenna Miranda is a(n) 77year old female here for follow-up status post exploration and fusion  She is doin

## 2022-01-31 DIAGNOSIS — Z98.1 S/P LUMBAR FUSION: ICD-10-CM

## 2022-01-31 RX ORDER — OXYCODONE AND ACETAMINOPHEN 10; 325 MG/1; MG/1
1 TABLET ORAL EVERY 8 HOURS PRN
Qty: 90 TABLET | Refills: 0 | Status: SHIPPED | OUTPATIENT
Start: 2022-01-31

## 2022-01-31 NOTE — TELEPHONE ENCOUNTER
Medication: percocet   1-2 q 8 #90    Date of last refill: 1-13-22  [place] 9[place]    Last office visit: 1-13-22  Due back to clinic per last office note:  6 weeks  Date next office visit scheduled:  2-17-22    La

## 2022-02-09 DIAGNOSIS — Z98.1 S/P LUMBAR FUSION: ICD-10-CM

## 2022-02-10 RX ORDER — CYCLOBENZAPRINE HCL 10 MG
TABLET ORAL
Qty: 60 TABLET | Refills: 0 | Status: SHIPPED | OUTPATIENT
Start: 2022-02-10 | End: 2022-05-26

## 2022-02-10 NOTE — TELEPHONE ENCOUNTER
Medication: Cyclobenzaprine     Date of last refill: 1/13/22 (#60/0)  [place]       Last office visit: 1/13/22  Due back to clinic per last office note:  6 weeks  Date next office visit scheduled:    Future Appointments   Date Time Provider Malaika Justin   2/17/2022  1:30 PM Cookie Jones MD ENINAPER2 [facility]   2/22/2022 11:00 AM David Flores MD [id number]   4/20/2022  9:40 AM Shereen Shen NP SP [facility]           Last OV note recommendation:    ASSESSMENT and PLAN:  69-year-old female status post exploration of fusion  She is doing well  She will follow-up in 6 weeks  We will hold off on x-rays at that time  We will see her again in 6 months with x-rays  Refilled her Percocet and Flexeril  She can start weaning her Percocet  All questions answered  Patient very appreciative

## 2022-02-17 ENCOUNTER — OFFICE VISIT (OUTPATIENT)
Dept: SURGERY | Facility: CLINIC | Age: 67
End: 2022-02-17
Payer: COMMERCIAL

## 2022-02-17 VITALS
BODY MASS INDEX: 42.85 KG/M2 | DIASTOLIC BLOOD PRESSURE: 78 MMHG | WEIGHT: 251 LBS | SYSTOLIC BLOOD PRESSURE: 126 MMHG | HEIGHT: 64 IN

## 2022-02-17 DIAGNOSIS — Z98.1 S/P LUMBAR FUSION: Primary | ICD-10-CM

## 2022-02-17 PROCEDURE — 3074F SYST BP LT 130 MM HG: CPT | Performed by: NEUROLOGICAL SURGERY

## 2022-02-17 PROCEDURE — 3078F DIAST BP <80 MM HG: CPT | Performed by: NEUROLOGICAL SURGERY

## 2022-02-17 PROCEDURE — 99024 POSTOP FOLLOW-UP VISIT: CPT | Performed by: NEUROLOGICAL SURGERY

## 2022-02-17 PROCEDURE — 3008F BODY MASS INDEX DOCD: CPT | Performed by: NEUROLOGICAL SURGERY

## 2022-02-22 ENCOUNTER — OFFICE VISIT (OUTPATIENT)
Dept: HEMATOLOGY/ONCOLOGY | Facility: HOSPITAL | Age: 67
End: 2022-02-22
Attending: INTERNAL MEDICINE
Payer: MEDICARE

## 2022-02-22 VITALS
HEART RATE: 67 BPM | TEMPERATURE: 98 F | WEIGHT: 249 LBS | HEIGHT: 64.02 IN | RESPIRATION RATE: 16 BRPM | SYSTOLIC BLOOD PRESSURE: 144 MMHG | BODY MASS INDEX: 42.51 KG/M2 | OXYGEN SATURATION: 96 % | DIASTOLIC BLOOD PRESSURE: 69 MMHG

## 2022-02-22 DIAGNOSIS — D50.0 IRON DEFICIENCY ANEMIA DUE TO CHRONIC BLOOD LOSS: Primary | ICD-10-CM

## 2022-02-22 LAB
ALBUMIN SERPL-MCNC: 3.9 G/DL (ref 3.4–5)
ALBUMIN/GLOB SERPL: 1 {RATIO} (ref 1–2)
ALP LIVER SERPL-CCNC: 109 U/L
ALT SERPL-CCNC: 26 U/L
ANION GAP SERPL CALC-SCNC: 7 MMOL/L (ref 0–18)
AST SERPL-CCNC: 23 U/L (ref 15–37)
BASOPHILS # BLD AUTO: 0.05 X10(3) UL (ref 0–0.2)
BASOPHILS NFR BLD AUTO: 0.7 %
BILIRUB SERPL-MCNC: 0.7 MG/DL (ref 0.1–2)
BUN BLD-MCNC: 16 MG/DL (ref 7–18)
CALCIUM BLD-MCNC: 9.4 MG/DL (ref 8.5–10.1)
CHLORIDE SERPL-SCNC: 107 MMOL/L (ref 98–112)
CO2 SERPL-SCNC: 24 MMOL/L (ref 21–32)
CREAT BLD-MCNC: 0.96 MG/DL
DEPRECATED HBV CORE AB SER IA-ACNC: 37.3 NG/ML
EOSINOPHIL # BLD AUTO: 0.14 X10(3) UL (ref 0–0.7)
EOSINOPHIL NFR BLD AUTO: 2 %
ERYTHROCYTE [DISTWIDTH] IN BLOOD BY AUTOMATED COUNT: 16.2 %
FASTING STATUS PATIENT QL REPORTED: NO
GLOBULIN PLAS-MCNC: 4 G/DL (ref 2.8–4.4)
GLUCOSE BLD-MCNC: 100 MG/DL (ref 70–99)
HCT VFR BLD AUTO: 47.1 %
HGB BLD-MCNC: 14.9 G/DL
IMM GRANULOCYTES # BLD AUTO: 0.02 X10(3) UL (ref 0–1)
IMM GRANULOCYTES NFR BLD: 0.3 %
IRON SATN MFR SERPL: 23 %
IRON SERPL-MCNC: 117 UG/DL
LYMPHOCYTES # BLD AUTO: 1.5 X10(3) UL (ref 1–4)
LYMPHOCYTES NFR BLD AUTO: 21.6 %
MCH RBC QN AUTO: 27.5 PG (ref 26–34)
MCHC RBC AUTO-ENTMCNC: 31.6 G/DL (ref 31–37)
MCV RBC AUTO: 87.1 FL
MONOCYTES # BLD AUTO: 0.65 X10(3) UL (ref 0.1–1)
MONOCYTES NFR BLD AUTO: 9.4 %
NEUTROPHILS # BLD AUTO: 4.59 X10 (3) UL (ref 1.5–7.7)
NEUTROPHILS # BLD AUTO: 4.59 X10(3) UL (ref 1.5–7.7)
NEUTROPHILS NFR BLD AUTO: 66 %
OSMOLALITY SERPL CALC.SUM OF ELEC: 287 MOSM/KG (ref 275–295)
PLATELET # BLD AUTO: 203 10(3)UL (ref 150–450)
POTASSIUM SERPL-SCNC: 4.1 MMOL/L (ref 3.5–5.1)
RBC # BLD AUTO: 5.41 X10(6)UL
SODIUM SERPL-SCNC: 138 MMOL/L (ref 136–145)
TIBC SERPL-MCNC: 498 UG/DL (ref 240–450)
TRANSFERRIN SERPL-MCNC: 334 MG/DL (ref 200–360)
WBC # BLD AUTO: 7 X10(3) UL (ref 4–11)

## 2022-02-22 PROCEDURE — 99213 OFFICE O/P EST LOW 20 MIN: CPT | Performed by: INTERNAL MEDICINE

## 2022-02-22 RX ORDER — GABAPENTIN 300 MG/1
CAPSULE ORAL
COMMUNITY
Start: 2022-02-21

## 2022-02-22 NOTE — PROGRESS NOTES
Patient is here for follow up for iron deficiency anemia and MGUS. She had back surgery on 12/1/2021. She has worsening neuropathy in her feet and legs. She is restarting gabapentin. She is experiencing a lot of fatigue. She has lost 30# without really trying. She was recommended to see her GI doctor again. She eats well but then has nausea.      Education Record    Learner:  Patient    Disease / Diagnosis: MGUS and anemia    Barriers / Limitations:  None   Comments:    Method:  Discussion   Comments:    General Topics:  Side effects and symptom management   Comments:    Outcome:  Shows understanding   Comments:

## 2022-03-21 ENCOUNTER — HOSPITAL ENCOUNTER (OUTPATIENT)
Dept: MAMMOGRAPHY | Age: 67
Discharge: HOME | End: 2022-03-21
Attending: INTERNAL MEDICINE
Payer: MEDICARE

## 2022-03-21 DIAGNOSIS — Z12.31 ENCOUNTER FOR SCREENING MAMMOGRAM FOR MALIGNANT NEOPLASM OF BREAST: ICD-10-CM

## 2022-03-21 PROCEDURE — 77063 BREAST TOMOSYNTHESIS BI: CPT | Performed by: INTERNAL MEDICINE

## 2022-03-21 PROCEDURE — 77067 SCR MAMMO BI INCL CAD: CPT | Performed by: INTERNAL MEDICINE

## 2022-04-05 ENCOUNTER — HOSPITAL ENCOUNTER (OUTPATIENT)
Dept: ULTRASOUND IMAGING | Age: 67
Discharge: HOME | End: 2022-04-05
Attending: INTERNAL MEDICINE
Payer: MEDICARE

## 2022-04-05 DIAGNOSIS — R42 DIZZINESS AND GIDDINESS: ICD-10-CM

## 2022-04-05 PROCEDURE — 93880 EXTRACRANIAL BILAT STUDY: CPT | Performed by: INTERNAL MEDICINE

## 2022-04-06 ENCOUNTER — TELEPHONE (OUTPATIENT)
Dept: SURGERY | Facility: CLINIC | Age: 67
End: 2022-04-06

## 2022-04-06 NOTE — TELEPHONE ENCOUNTER
Received completed medical necessity form for an osteogenesis stimulator. Faxed to [facility]. Copy sent to scan.

## 2022-04-26 ENCOUNTER — HOSPITAL ENCOUNTER (OUTPATIENT)
Dept: MRI IMAGING | Facility: HOSPITAL | Age: 67
Discharge: HOME | End: 2022-04-26
Attending: INTERNAL MEDICINE
Payer: MEDICARE

## 2022-04-26 DIAGNOSIS — R42 DIZZINESS AND GIDDINESS: ICD-10-CM

## 2022-04-26 PROCEDURE — 70551 MRI BRAIN STEM W/O DYE: CPT | Performed by: INTERNAL MEDICINE

## 2022-05-12 ENCOUNTER — HOSPITAL ENCOUNTER (OUTPATIENT)
Dept: GENERAL RADIOLOGY | Age: 67
Discharge: HOME | End: 2022-05-12
Attending: NEUROLOGICAL SURGERY
Payer: MEDICARE

## 2022-05-12 DIAGNOSIS — Z98.1 S/P LUMBAR FUSION: ICD-10-CM

## 2022-05-12 PROCEDURE — 72100 X-RAY EXAM L-S SPINE 2/3 VWS: CPT | Performed by: NEUROLOGICAL SURGERY

## 2022-05-17 ENCOUNTER — OFFICE VISIT (OUTPATIENT)
Dept: SURGERY | Facility: CLINIC | Age: 67
End: 2022-05-17
Payer: COMMERCIAL

## 2022-05-17 VITALS
HEIGHT: 64 IN | BODY MASS INDEX: 38.93 KG/M2 | HEART RATE: 89 BPM | OXYGEN SATURATION: 98 % | RESPIRATION RATE: 16 BRPM | SYSTOLIC BLOOD PRESSURE: 130 MMHG | DIASTOLIC BLOOD PRESSURE: 86 MMHG | WEIGHT: 228 LBS

## 2022-05-17 DIAGNOSIS — Z98.1 S/P LUMBAR FUSION: Primary | ICD-10-CM

## 2022-05-17 PROCEDURE — 99212 OFFICE O/P EST SF 10 MIN: CPT | Performed by: NEUROLOGICAL SURGERY

## 2022-05-17 PROCEDURE — 3075F SYST BP GE 130 - 139MM HG: CPT | Performed by: NEUROLOGICAL SURGERY

## 2022-05-17 PROCEDURE — 3008F BODY MASS INDEX DOCD: CPT | Performed by: NEUROLOGICAL SURGERY

## 2022-05-17 PROCEDURE — 3079F DIAST BP 80-89 MM HG: CPT | Performed by: NEUROLOGICAL SURGERY

## 2022-05-26 ENCOUNTER — OFFICE VISIT (OUTPATIENT)
Dept: NEUROLOGY | Facility: CLINIC | Age: 67
End: 2022-05-26
Payer: COMMERCIAL

## 2022-05-26 VITALS
WEIGHT: 222 LBS | RESPIRATION RATE: 16 BRPM | DIASTOLIC BLOOD PRESSURE: 86 MMHG | SYSTOLIC BLOOD PRESSURE: 130 MMHG | HEART RATE: 86 BPM | HEIGHT: 64 IN | BODY MASS INDEX: 37.9 KG/M2

## 2022-05-26 DIAGNOSIS — R42 DIZZINESS: ICD-10-CM

## 2022-05-26 DIAGNOSIS — Z86.73 STROKE, RECENT, WITHOUT LATE EFFECT: Primary | ICD-10-CM

## 2022-05-26 DIAGNOSIS — M62.838 SPASM OF MUSCLE: ICD-10-CM

## 2022-05-26 DIAGNOSIS — M54.40 BILATERAL LOW BACK PAIN WITH SCIATICA, SCIATICA LATERALITY UNSPECIFIED, UNSPECIFIED CHRONICITY: ICD-10-CM

## 2022-05-26 PROCEDURE — 99215 OFFICE O/P EST HI 40 MIN: CPT | Performed by: OTHER

## 2022-05-26 PROCEDURE — 3008F BODY MASS INDEX DOCD: CPT | Performed by: OTHER

## 2022-05-26 PROCEDURE — 3079F DIAST BP 80-89 MM HG: CPT | Performed by: OTHER

## 2022-05-26 PROCEDURE — 3075F SYST BP GE 130 - 139MM HG: CPT | Performed by: OTHER

## 2022-05-26 RX ORDER — OXYBUTYNIN CHLORIDE 5 MG/1
TABLET ORAL
COMMUNITY

## 2022-05-26 RX ORDER — GABAPENTIN 300 MG/1
CAPSULE ORAL
COMMUNITY
End: 2022-05-26

## 2022-05-26 RX ORDER — ASPIRIN 81 MG/1
TABLET ORAL
COMMUNITY

## 2022-05-26 RX ORDER — PREGABALIN 50 MG/1
50 CAPSULE ORAL 3 TIMES DAILY
Qty: 90 CAPSULE | Refills: 5 | Status: SHIPPED | OUTPATIENT
Start: 2022-05-26

## 2022-05-27 ENCOUNTER — TELEPHONE (OUTPATIENT)
Dept: SURGERY | Facility: CLINIC | Age: 67
End: 2022-05-27

## 2022-05-27 NOTE — TELEPHONE ENCOUNTER
Received call from [facility] pharmacist Kelsy Staton) stating patient was prescribed Lyrica yesterday but she also has Gabapentin in her medication file. Ivan Cevallos informed Dr Esdras Gomez discontinued Gabapentin and wants patient to take Lyrica.

## 2022-06-08 ENCOUNTER — HOSPITAL ENCOUNTER (OUTPATIENT)
Dept: CV DIAGNOSTICS | Facility: HOSPITAL | Age: 67
Discharge: HOME | End: 2022-06-08
Attending: Other
Payer: MEDICARE

## 2022-06-08 DIAGNOSIS — Z86.73 STROKE, RECENT, WITHOUT LATE EFFECT: ICD-10-CM

## 2022-06-08 PROCEDURE — 93306 TTE W/DOPPLER COMPLETE: CPT | Performed by: OTHER

## 2022-06-17 ENCOUNTER — LAB ENCOUNTER (OUTPATIENT)
Dept: LAB | Age: 67
End: 2022-06-17
Attending: Other
Payer: MEDICARE

## 2022-06-17 DIAGNOSIS — Z86.73 STROKE, RECENT, WITHOUT LATE EFFECT: ICD-10-CM

## 2022-06-17 LAB
CHOLEST SERPL-MCNC: 122 MG/DL (ref ?–200)
EST. AVERAGE GLUCOSE BLD GHB EST-MCNC: 123 MG/DL (ref 68–126)
FASTING PATIENT LIPID ANSWER: YES
HBA1C MFR BLD: 5.9 % (ref ?–5.7)
HDLC SERPL-MCNC: 36 MG/DL (ref 40–59)
LDLC SERPL CALC-MCNC: 45 MG/DL (ref ?–100)
NONHDLC SERPL-MCNC: 86 MG/DL (ref ?–130)
TRIGL SERPL-MCNC: 261 MG/DL (ref 30–149)
VLDLC SERPL CALC-MCNC: 37 MG/DL (ref 0–30)

## 2022-06-17 PROCEDURE — 80061 LIPID PANEL: CPT

## 2022-06-17 PROCEDURE — 36415 COLL VENOUS BLD VENIPUNCTURE: CPT

## 2022-06-17 PROCEDURE — 83036 HEMOGLOBIN GLYCOSYLATED A1C: CPT

## 2022-07-05 ENCOUNTER — HOSPITAL ENCOUNTER (OUTPATIENT)
Dept: CT IMAGING | Facility: HOSPITAL | Age: 67
Discharge: HOME | End: 2022-07-05
Attending: INTERNAL MEDICINE
Payer: MEDICARE

## 2022-07-05 DIAGNOSIS — R10.32 LEFT LOWER QUADRANT PAIN: ICD-10-CM

## 2022-07-05 LAB — CREAT BLD-MCNC: 1 MG/DL

## 2022-07-05 PROCEDURE — 82565 ASSAY OF CREATININE: CPT

## 2022-07-05 PROCEDURE — 74177 CT ABD & PELVIS W/CONTRAST: CPT | Performed by: INTERNAL MEDICINE

## 2022-07-19 ENCOUNTER — LAB ENCOUNTER (OUTPATIENT)
Dept: LAB | Age: 67
End: 2022-07-19
Attending: INTERNAL MEDICINE
Payer: MEDICARE

## 2022-07-19 DIAGNOSIS — R06.09 DYSPNEA ON EXERTION: ICD-10-CM

## 2022-07-19 DIAGNOSIS — R10.32 LEFT LOWER QUADRANT PAIN: Primary | ICD-10-CM

## 2022-07-19 DIAGNOSIS — E78.00 HYPERCHOLESTEROLEMIA: ICD-10-CM

## 2022-07-19 DIAGNOSIS — E11.9 CONTROLLED TYPE 2 DIABETES MELLITUS WITHOUT COMPLICATION (HCC): ICD-10-CM

## 2022-07-19 DIAGNOSIS — R93.1 ABNORMAL ECHOCARDIOGRAM: ICD-10-CM

## 2022-07-19 DIAGNOSIS — E03.9 HYPOTHYROIDISM, UNSPECIFIED: ICD-10-CM

## 2022-07-19 DIAGNOSIS — I63.9 CEREBROVASCULAR ACCIDENT (CVA) OF UNCERTAIN PATHOLOGY (HCC): ICD-10-CM

## 2022-07-19 DIAGNOSIS — I10 ESSENTIAL HYPERTENSION: ICD-10-CM

## 2022-07-19 LAB
ALBUMIN SERPL-MCNC: 3.7 G/DL (ref 3.4–5)
ALBUMIN/GLOB SERPL: 1 {RATIO} (ref 1–2)
ALP LIVER SERPL-CCNC: 102 U/L
ALT SERPL-CCNC: 25 U/L
ANION GAP SERPL CALC-SCNC: 5 MMOL/L (ref 0–18)
AST SERPL-CCNC: 30 U/L (ref 15–37)
BASOPHILS # BLD AUTO: 0.05 X10(3) UL (ref 0–0.2)
BASOPHILS NFR BLD AUTO: 0.9 %
BILIRUB SERPL-MCNC: 0.4 MG/DL (ref 0.1–2)
BUN BLD-MCNC: 30 MG/DL (ref 7–18)
CALCIUM BLD-MCNC: 9.1 MG/DL (ref 8.5–10.1)
CHLORIDE SERPL-SCNC: 106 MMOL/L (ref 98–112)
CHOLEST SERPL-MCNC: 132 MG/DL (ref ?–200)
CO2 SERPL-SCNC: 27 MMOL/L (ref 21–32)
CREAT BLD-MCNC: 1.07 MG/DL
EOSINOPHIL # BLD AUTO: 0.07 X10(3) UL (ref 0–0.7)
EOSINOPHIL NFR BLD AUTO: 1.2 %
ERYTHROCYTE [DISTWIDTH] IN BLOOD BY AUTOMATED COUNT: 12.4 %
EST. AVERAGE GLUCOSE BLD GHB EST-MCNC: 131 MG/DL (ref 68–126)
FASTING PATIENT LIPID ANSWER: YES
FASTING STATUS PATIENT QL REPORTED: YES
GLOBULIN PLAS-MCNC: 3.6 G/DL (ref 2.8–4.4)
GLUCOSE BLD-MCNC: 113 MG/DL (ref 70–99)
HBA1C MFR BLD: 6.2 % (ref ?–5.7)
HCT VFR BLD AUTO: 41.6 %
HDLC SERPL-MCNC: 36 MG/DL (ref 40–59)
HGB BLD-MCNC: 13 G/DL
IMM GRANULOCYTES # BLD AUTO: 0.01 X10(3) UL (ref 0–1)
IMM GRANULOCYTES NFR BLD: 0.2 %
LDLC SERPL CALC-MCNC: 62 MG/DL (ref ?–100)
LYMPHOCYTES # BLD AUTO: 1.31 X10(3) UL (ref 1–4)
LYMPHOCYTES NFR BLD AUTO: 22.4 %
MCH RBC QN AUTO: 29.7 PG (ref 26–34)
MCHC RBC AUTO-ENTMCNC: 31.3 G/DL (ref 31–37)
MCV RBC AUTO: 95.2 FL
MONOCYTES # BLD AUTO: 0.54 X10(3) UL (ref 0.1–1)
MONOCYTES NFR BLD AUTO: 9.2 %
NEUTROPHILS # BLD AUTO: 3.86 X10 (3) UL (ref 1.5–7.7)
NEUTROPHILS # BLD AUTO: 3.86 X10(3) UL (ref 1.5–7.7)
NEUTROPHILS NFR BLD AUTO: 66.1 %
NONHDLC SERPL-MCNC: 96 MG/DL (ref ?–130)
OSMOLALITY SERPL CALC.SUM OF ELEC: 293 MOSM/KG (ref 275–295)
PLATELET # BLD AUTO: 185 10(3)UL (ref 150–450)
POTASSIUM SERPL-SCNC: 4.2 MMOL/L (ref 3.5–5.1)
PROT SERPL-MCNC: 7.3 G/DL (ref 6.4–8.2)
RBC # BLD AUTO: 4.37 X10(6)UL
SODIUM SERPL-SCNC: 138 MMOL/L (ref 136–145)
T4 FREE SERPL-MCNC: 1.1 NG/DL (ref 0.8–1.7)
TRIGL SERPL-MCNC: 210 MG/DL (ref 30–149)
TSI SER-ACNC: 1.09 MIU/ML (ref 0.36–3.74)
VLDLC SERPL CALC-MCNC: 31 MG/DL (ref 0–30)
WBC # BLD AUTO: 5.8 X10(3) UL (ref 4–11)

## 2022-07-19 PROCEDURE — 84443 ASSAY THYROID STIM HORMONE: CPT

## 2022-07-19 PROCEDURE — 84439 ASSAY OF FREE THYROXINE: CPT

## 2022-07-19 PROCEDURE — 83036 HEMOGLOBIN GLYCOSYLATED A1C: CPT

## 2022-07-19 PROCEDURE — 36415 COLL VENOUS BLD VENIPUNCTURE: CPT

## 2022-07-19 PROCEDURE — 80061 LIPID PANEL: CPT

## 2022-07-19 PROCEDURE — 85025 COMPLETE CBC W/AUTO DIFF WBC: CPT

## 2022-07-19 PROCEDURE — 80053 COMPREHEN METABOLIC PANEL: CPT

## 2022-07-28 ENCOUNTER — OFFICE VISIT (OUTPATIENT)
Dept: SURGERY | Facility: CLINIC | Age: 67
End: 2022-07-28
Payer: COMMERCIAL

## 2022-07-28 DIAGNOSIS — N95.2 VAGINAL ATROPHY: ICD-10-CM

## 2022-07-28 DIAGNOSIS — N30.90 RECURRENT CYSTITIS: Primary | ICD-10-CM

## 2022-07-28 DIAGNOSIS — R82.90 URINE FINDING: ICD-10-CM

## 2022-07-28 LAB
APPEARANCE: CLEAR
BILIRUBIN: NEGATIVE
GLUCOSE (URINE DIPSTICK): NEGATIVE MG/DL
KETONES (URINE DIPSTICK): NEGATIVE MG/DL
LEUKOCYTES: NEGATIVE
MULTISTIX LOT#: ABNORMAL NUMERIC
NITRITE, URINE: NEGATIVE
PH, URINE: 5.5 (ref 4.5–8)
PROTEIN (URINE DIPSTICK): NEGATIVE MG/DL
SPECIFIC GRAVITY: 1.02 (ref 1–1.03)
UROBILINOGEN,SEMI-QN: 0.2 MG/DL (ref 0–1.9)

## 2022-07-28 PROCEDURE — 99203 OFFICE O/P NEW LOW 30 MIN: CPT | Performed by: PHYSICIAN ASSISTANT

## 2022-07-28 PROCEDURE — 81003 URINALYSIS AUTO W/O SCOPE: CPT | Performed by: PHYSICIAN ASSISTANT

## 2022-07-28 RX ORDER — GABAPENTIN 300 MG/1
CAPSULE ORAL
COMMUNITY
Start: 2022-07-27

## 2022-07-28 RX ORDER — DICYCLOMINE HCL 20 MG
TABLET ORAL
COMMUNITY
Start: 2022-07-05

## 2022-07-28 RX ORDER — SERTRALINE HYDROCHLORIDE 25 MG/1
TABLET, FILM COATED ORAL
COMMUNITY
Start: 2022-07-05

## 2022-07-28 RX ORDER — ESTRADIOL 0.1 MG/G
CREAM VAGINAL
Qty: 42.5 G | Refills: 5 | Status: SHIPPED | OUTPATIENT
Start: 2022-07-28

## 2022-07-28 RX ORDER — SUCRALFATE 1 G/1
TABLET ORAL
COMMUNITY
Start: 2022-07-05

## 2022-08-08 ENCOUNTER — TELEPHONE (OUTPATIENT)
Dept: SURGERY | Facility: CLINIC | Age: 67
End: 2022-08-08

## 2022-08-08 NOTE — TELEPHONE ENCOUNTER
This patient appears to be double booked next Friday with vasectomy. The patient will need to be rescheduled. If you have an opening please put her there. Otherwise could perhaps do this at 7:45 that morning. Please block that spot when you reschedule so we don't have another double book.     Thanks  MPH

## 2022-08-19 ENCOUNTER — PROCEDURE (OUTPATIENT)
Dept: SURGERY | Facility: CLINIC | Age: 67
End: 2022-08-19
Payer: COMMERCIAL

## 2022-08-19 DIAGNOSIS — N30.90 RECURRENT CYSTITIS: Primary | ICD-10-CM

## 2022-08-19 DIAGNOSIS — Z87.442 HISTORY OF KIDNEY STONES: ICD-10-CM

## 2022-08-19 DIAGNOSIS — N39.41 URGE INCONTINENCE: ICD-10-CM

## 2022-08-19 DIAGNOSIS — R35.0 URINARY FREQUENCY: ICD-10-CM

## 2022-08-19 DIAGNOSIS — N95.2 VAGINAL ATROPHY: ICD-10-CM

## 2022-08-19 DIAGNOSIS — R31.21 ASYMPTOMATIC MICROSCOPIC HEMATURIA: ICD-10-CM

## 2022-08-19 LAB
APPEARANCE: CLEAR
BILIRUBIN: NEGATIVE
GLUCOSE (URINE DIPSTICK): NEGATIVE MG/DL
KETONES (URINE DIPSTICK): NEGATIVE MG/DL
LEUKOCYTES: NEGATIVE
MULTISTIX LOT#: NORMAL NUMERIC
NITRITE, URINE: NEGATIVE
OCCULT BLOOD: NEGATIVE
PH, URINE: 5 (ref 4.5–8)
PROTEIN (URINE DIPSTICK): NEGATIVE MG/DL
SPECIFIC GRAVITY: 1.01 (ref 1–1.03)
UROBILINOGEN,SEMI-QN: 0.2 MG/DL (ref 0–1.9)

## 2022-08-19 PROCEDURE — 81003 URINALYSIS AUTO W/O SCOPE: CPT | Performed by: UROLOGY

## 2022-08-19 PROCEDURE — 99214 OFFICE O/P EST MOD 30 MIN: CPT | Performed by: UROLOGY

## 2022-08-19 PROCEDURE — 52000 CYSTOURETHROSCOPY: CPT | Performed by: UROLOGY

## 2022-08-19 RX ORDER — OXYBUTYNIN CHLORIDE 15 MG/1
15 TABLET, EXTENDED RELEASE ORAL DAILY
Qty: 90 TABLET | Refills: 6 | Status: SHIPPED | OUTPATIENT
Start: 2022-08-19

## 2022-08-19 RX ORDER — CIPROFLOXACIN 500 MG/1
500 TABLET, FILM COATED ORAL ONCE
Status: COMPLETED | OUTPATIENT
Start: 2022-08-19 | End: 2022-08-19

## 2022-08-19 RX ADMIN — CIPROFLOXACIN 500 MG: 500 TABLET, FILM COATED ORAL at 09:28:00

## 2022-08-23 ENCOUNTER — OFFICE VISIT (OUTPATIENT)
Dept: HEMATOLOGY/ONCOLOGY | Facility: HOSPITAL | Age: 67
End: 2022-08-23
Attending: INTERNAL MEDICINE
Payer: MEDICARE

## 2022-08-23 VITALS
HEART RATE: 54 BPM | BODY MASS INDEX: 41.66 KG/M2 | SYSTOLIC BLOOD PRESSURE: 146 MMHG | OXYGEN SATURATION: 96 % | TEMPERATURE: 98 F | WEIGHT: 244 LBS | RESPIRATION RATE: 18 BRPM | DIASTOLIC BLOOD PRESSURE: 64 MMHG | HEIGHT: 64.02 IN

## 2022-08-23 DIAGNOSIS — D50.0 IRON DEFICIENCY ANEMIA DUE TO CHRONIC BLOOD LOSS: Primary | ICD-10-CM

## 2022-08-23 DIAGNOSIS — D47.2 MGUS (MONOCLONAL GAMMOPATHY OF UNKNOWN SIGNIFICANCE): ICD-10-CM

## 2022-08-23 LAB
ALBUMIN SERPL-MCNC: 3.6 G/DL (ref 3.4–5)
ALBUMIN/GLOB SERPL: 1 {RATIO} (ref 1–2)
ALP LIVER SERPL-CCNC: 101 U/L
ALT SERPL-CCNC: 26 U/L
ANION GAP SERPL CALC-SCNC: 5 MMOL/L (ref 0–18)
AST SERPL-CCNC: 28 U/L (ref 15–37)
BASOPHILS # BLD AUTO: 0.04 X10(3) UL (ref 0–0.2)
BASOPHILS NFR BLD AUTO: 0.8 %
BILIRUB SERPL-MCNC: 0.4 MG/DL (ref 0.1–2)
BUN BLD-MCNC: 25 MG/DL (ref 7–18)
CALCIUM BLD-MCNC: 9 MG/DL (ref 8.5–10.1)
CHLORIDE SERPL-SCNC: 106 MMOL/L (ref 98–112)
CO2 SERPL-SCNC: 25 MMOL/L (ref 21–32)
CREAT BLD-MCNC: 1.07 MG/DL
DEPRECATED HBV CORE AB SER IA-ACNC: 12.2 NG/ML
EOSINOPHIL # BLD AUTO: 0.12 X10(3) UL (ref 0–0.7)
EOSINOPHIL NFR BLD AUTO: 2.3 %
ERYTHROCYTE [DISTWIDTH] IN BLOOD BY AUTOMATED COUNT: 12.7 %
GFR SERPLBLD BASED ON 1.73 SQ M-ARVRAT: 57 ML/MIN/1.73M2 (ref 60–?)
GLOBULIN PLAS-MCNC: 3.6 G/DL (ref 2.8–4.4)
GLUCOSE BLD-MCNC: 96 MG/DL (ref 70–99)
HCT VFR BLD AUTO: 38.7 %
HGB BLD-MCNC: 12.2 G/DL
IMM GRANULOCYTES # BLD AUTO: 0.01 X10(3) UL (ref 0–1)
IMM GRANULOCYTES NFR BLD: 0.2 %
IMMUNOGLOBULIN PNL SER-MCNC: 780 MG/DL (ref 791–1643)
IRON SATN MFR SERPL: 10 %
IRON SERPL-MCNC: 52 UG/DL
LYMPHOCYTES # BLD AUTO: 1.28 X10(3) UL (ref 1–4)
LYMPHOCYTES NFR BLD AUTO: 24.2 %
MCH RBC QN AUTO: 28.7 PG (ref 26–34)
MCHC RBC AUTO-ENTMCNC: 31.5 G/DL (ref 31–37)
MCV RBC AUTO: 91.1 FL
MONOCYTES # BLD AUTO: 0.59 X10(3) UL (ref 0.1–1)
MONOCYTES NFR BLD AUTO: 11.1 %
NEUTROPHILS # BLD AUTO: 3.26 X10 (3) UL (ref 1.5–7.7)
NEUTROPHILS # BLD AUTO: 3.26 X10(3) UL (ref 1.5–7.7)
NEUTROPHILS NFR BLD AUTO: 61.4 %
OSMOLALITY SERPL CALC.SUM OF ELEC: 286 MOSM/KG (ref 275–295)
PLATELET # BLD AUTO: 176 10(3)UL (ref 150–450)
POTASSIUM SERPL-SCNC: 4.4 MMOL/L (ref 3.5–5.1)
PROT SERPL-MCNC: 7.2 G/DL (ref 6.4–8.2)
PROT UR-MCNC: <5 MG/DL
RBC # BLD AUTO: 4.25 X10(6)UL
SODIUM SERPL-SCNC: 136 MMOL/L (ref 136–145)
TIBC SERPL-MCNC: 504 UG/DL (ref 240–450)
TRANSFERRIN SERPL-MCNC: 338 MG/DL (ref 200–360)
WBC # BLD AUTO: 5.3 X10(3) UL (ref 4–11)

## 2022-08-23 PROCEDURE — 99214 OFFICE O/P EST MOD 30 MIN: CPT | Performed by: INTERNAL MEDICINE

## 2022-08-23 RX ORDER — ATORVASTATIN CALCIUM 40 MG/1
40 TABLET, FILM COATED ORAL NIGHTLY
COMMUNITY

## 2022-08-23 NOTE — PROGRESS NOTES
Education Record    Learner:  Patient    Disease / Diagnosis:iron deficiency anemia    Barriers / Limitations:  None   Comments:    Method:  Discussion   Comments:    General Topics:  Plan of care reviewed   Comments:    Outcome:  Shows understanding   Comments:    Patient here for follow-up. States she is still experiencing fatigue and lightheadedness. Denies any additional symptoms at this time.

## 2022-08-29 LAB
ALBUMIN SERPL ELPH-MCNC: 3.93 G/DL (ref 3.75–5.21)
ALBUMIN/GLOB SERPL: 1.32 {RATIO} (ref 1–2)
ALPHA1 GLOB SERPL ELPH-MCNC: 0.26 G/DL (ref 0.19–0.46)
ALPHA2 GLOB SERPL ELPH-MCNC: 1.09 G/DL (ref 0.48–1.05)
B-GLOBULIN SERPL ELPH-MCNC: 0.86 G/DL (ref 0.68–1.23)
GAMMA GLOB SERPL ELPH-MCNC: 0.76 G/DL (ref 0.62–1.7)
KAPPA LC FREE SER-MCNC: 3.06 MG/DL (ref 0.33–1.94)
KAPPA LC FREE/LAMBDA FREE SER NEPH: 1.29 {RATIO} (ref 0.26–1.65)
LAMBDA LC FREE SERPL-MCNC: 2.37 MG/DL (ref 0.57–2.63)
PROT SERPL-MCNC: 6.9 G/DL (ref 6.4–8.2)

## 2022-08-31 ENCOUNTER — OFFICE VISIT (OUTPATIENT)
Dept: HEMATOLOGY/ONCOLOGY | Facility: HOSPITAL | Age: 67
End: 2022-08-31
Attending: INTERNAL MEDICINE
Payer: MEDICARE

## 2022-08-31 VITALS
SYSTOLIC BLOOD PRESSURE: 117 MMHG | TEMPERATURE: 98 F | HEART RATE: 57 BPM | OXYGEN SATURATION: 96 % | DIASTOLIC BLOOD PRESSURE: 69 MMHG | RESPIRATION RATE: 18 BRPM

## 2022-08-31 DIAGNOSIS — D50.0 IRON DEFICIENCY ANEMIA DUE TO CHRONIC BLOOD LOSS: Primary | ICD-10-CM

## 2022-08-31 PROCEDURE — 96365 THER/PROPH/DIAG IV INF INIT: CPT

## 2022-08-31 NOTE — PROGRESS NOTES
Education Record    Learner:  Patient    Disease / Diagnosis: Iron Deficiency     Barriers / Limitations:  None   Comments:    Method:  Discussion   Comments:    General Topics:  Plan of care reviewed   Comments:    Outcome:  Shows understanding   Comments: Pt tolerated inf well. 30min observation performed. Will continue to monitor.

## 2022-11-14 ENCOUNTER — HOSPITAL ENCOUNTER (OUTPATIENT)
Dept: CT IMAGING | Age: 67
Discharge: HOME | End: 2022-11-14
Attending: NEUROLOGICAL SURGERY
Payer: MEDICARE

## 2022-11-14 DIAGNOSIS — Z98.1 S/P LUMBAR FUSION: ICD-10-CM

## 2022-11-14 PROCEDURE — 72131 CT LUMBAR SPINE W/O DYE: CPT | Performed by: NEUROLOGICAL SURGERY

## 2022-11-17 ENCOUNTER — OFFICE VISIT (OUTPATIENT)
Dept: SURGERY | Facility: CLINIC | Age: 67
End: 2022-11-17
Payer: COMMERCIAL

## 2022-11-17 VITALS
SYSTOLIC BLOOD PRESSURE: 120 MMHG | HEART RATE: 73 BPM | HEIGHT: 64 IN | DIASTOLIC BLOOD PRESSURE: 84 MMHG | BODY MASS INDEX: 38.93 KG/M2 | WEIGHT: 228 LBS

## 2022-11-17 DIAGNOSIS — Z98.1 S/P LUMBAR FUSION: Primary | ICD-10-CM

## 2022-11-17 PROCEDURE — 3079F DIAST BP 80-89 MM HG: CPT | Performed by: NEUROLOGICAL SURGERY

## 2022-11-17 PROCEDURE — 99212 OFFICE O/P EST SF 10 MIN: CPT | Performed by: NEUROLOGICAL SURGERY

## 2022-11-17 PROCEDURE — 3074F SYST BP LT 130 MM HG: CPT | Performed by: NEUROLOGICAL SURGERY

## 2022-11-17 PROCEDURE — 3008F BODY MASS INDEX DOCD: CPT | Performed by: NEUROLOGICAL SURGERY

## 2022-11-17 RX ORDER — ROSUVASTATIN CALCIUM 20 MG/1
TABLET, COATED ORAL
COMMUNITY
Start: 2022-11-07

## 2022-11-17 NOTE — PROGRESS NOTES
Patient here for a 6 month follow up after imaging. Patient states she continues having back pain.     Pain score: 6/10

## 2022-11-28 ENCOUNTER — OFFICE VISIT (OUTPATIENT)
Dept: NEUROLOGY | Facility: CLINIC | Age: 67
End: 2022-11-28
Payer: COMMERCIAL

## 2022-11-28 VITALS
DIASTOLIC BLOOD PRESSURE: 74 MMHG | RESPIRATION RATE: 16 BRPM | BODY MASS INDEX: 39 KG/M2 | WEIGHT: 228 LBS | HEART RATE: 64 BPM | SYSTOLIC BLOOD PRESSURE: 120 MMHG

## 2022-11-28 DIAGNOSIS — M54.17 LUMBOSACRAL RADICULOPATHY: ICD-10-CM

## 2022-11-28 DIAGNOSIS — G43.011 INTRACTABLE MIGRAINE WITHOUT AURA AND WITH STATUS MIGRAINOSUS: ICD-10-CM

## 2022-11-28 DIAGNOSIS — G62.9 POLYNEUROPATHY: Primary | ICD-10-CM

## 2022-11-28 DIAGNOSIS — I63.81 BASAL GANGLIA STROKE (HCC): ICD-10-CM

## 2022-11-28 PROCEDURE — 3074F SYST BP LT 130 MM HG: CPT | Performed by: OTHER

## 2022-11-28 PROCEDURE — 3078F DIAST BP <80 MM HG: CPT | Performed by: OTHER

## 2022-11-28 PROCEDURE — 99214 OFFICE O/P EST MOD 30 MIN: CPT | Performed by: OTHER

## 2022-11-28 RX ORDER — METHYLPREDNISOLONE 4 MG/1
TABLET ORAL
Qty: 1 EACH | Refills: 0 | Status: SHIPPED | OUTPATIENT
Start: 2022-11-28

## 2022-11-28 RX ORDER — PREGABALIN 100 MG/1
CAPSULE ORAL
Qty: 90 CAPSULE | Refills: 2 | Status: SHIPPED | OUTPATIENT
Start: 2022-11-28

## 2022-11-28 NOTE — PROGRESS NOTES
Patient states increase pain in BLE. Patient states increase in headaches. Patient states headaches are daily for the past 2 weeks.

## 2023-01-24 ENCOUNTER — OFFICE VISIT (OUTPATIENT)
Facility: LOCATION | Age: 68
End: 2023-01-24
Payer: COMMERCIAL

## 2023-01-24 DIAGNOSIS — R05.3 CHRONIC COUGH: Primary | ICD-10-CM

## 2023-01-24 PROCEDURE — 99204 OFFICE O/P NEW MOD 45 MIN: CPT | Performed by: OTOLARYNGOLOGY

## 2023-01-24 RX ORDER — AZELASTINE 1 MG/ML
2 SPRAY, METERED NASAL 2 TIMES DAILY
Qty: 30 ML | Refills: 3 | Status: SHIPPED | OUTPATIENT
Start: 2023-01-24

## 2023-02-21 ENCOUNTER — LAB ENCOUNTER (OUTPATIENT)
Dept: LAB | Age: 68
End: 2023-02-21
Attending: STUDENT IN AN ORGANIZED HEALTH CARE EDUCATION/TRAINING PROGRAM
Payer: MEDICARE

## 2023-02-21 DIAGNOSIS — D50.9 IRON DEFICIENCY ANEMIA: ICD-10-CM

## 2023-02-21 LAB
CHLORIDE SERPL-SCNC: 105 MMOL/L (ref 98–112)
CO2 SERPL-SCNC: 27 MMOL/L (ref 21–32)
POTASSIUM SERPL-SCNC: 4.1 MMOL/L (ref 3.5–5.1)
SODIUM SERPL-SCNC: 138 MMOL/L (ref 136–145)

## 2023-02-21 PROCEDURE — 80051 ELECTROLYTE PANEL: CPT

## 2023-02-21 PROCEDURE — 36415 COLL VENOUS BLD VENIPUNCTURE: CPT

## 2023-02-22 LAB — SARS-COV-2 RNA RESP QL NAA+PROBE: NOT DETECTED

## 2023-02-23 ENCOUNTER — HOSPITAL ENCOUNTER (OUTPATIENT)
Facility: HOSPITAL | Age: 68
Setting detail: HOSPITAL OUTPATIENT SURGERY
Discharge: HOME | End: 2023-02-23
Attending: STUDENT IN AN ORGANIZED HEALTH CARE EDUCATION/TRAINING PROGRAM | Admitting: STUDENT IN AN ORGANIZED HEALTH CARE EDUCATION/TRAINING PROGRAM
Payer: MEDICARE

## 2023-02-23 ENCOUNTER — ANESTHESIA EVENT (OUTPATIENT)
Dept: ENDOSCOPY | Facility: HOSPITAL | Age: 68
End: 2023-02-23
Payer: MEDICARE

## 2023-02-23 ENCOUNTER — ANESTHESIA (OUTPATIENT)
Dept: ENDOSCOPY | Facility: HOSPITAL | Age: 68
End: 2023-02-23
Payer: MEDICARE

## 2023-02-23 VITALS
TEMPERATURE: 98 F | SYSTOLIC BLOOD PRESSURE: 123 MMHG | BODY MASS INDEX: 42 KG/M2 | WEIGHT: 246 LBS | DIASTOLIC BLOOD PRESSURE: 85 MMHG | OXYGEN SATURATION: 94 % | HEART RATE: 70 BPM | RESPIRATION RATE: 20 BRPM | HEIGHT: 64 IN

## 2023-02-23 DIAGNOSIS — R10.13 EPIGASTRIC PAIN: ICD-10-CM

## 2023-02-23 DIAGNOSIS — Z01.818 PREOP TESTING: ICD-10-CM

## 2023-02-23 DIAGNOSIS — D50.9 IRON DEFICIENCY ANEMIA: Primary | ICD-10-CM

## 2023-02-23 DIAGNOSIS — Z98.84 HISTORY OF GASTRIC BYPASS: ICD-10-CM

## 2023-02-23 DIAGNOSIS — D50.8 OTHER IRON DEFICIENCY ANEMIA: ICD-10-CM

## 2023-02-23 PROCEDURE — 0W3P8ZZ CONTROL BLEEDING IN GASTROINTESTINAL TRACT, VIA NATURAL OR ARTIFICIAL OPENING ENDOSCOPIC: ICD-10-PCS | Performed by: STUDENT IN AN ORGANIZED HEALTH CARE EDUCATION/TRAINING PROGRAM

## 2023-02-23 PROCEDURE — 0DJ08ZZ INSPECTION OF UPPER INTESTINAL TRACT, VIA NATURAL OR ARTIFICIAL OPENING ENDOSCOPIC: ICD-10-PCS | Performed by: STUDENT IN AN ORGANIZED HEALTH CARE EDUCATION/TRAINING PROGRAM

## 2023-02-23 RX ORDER — SODIUM CHLORIDE, SODIUM LACTATE, POTASSIUM CHLORIDE, CALCIUM CHLORIDE 600; 310; 30; 20 MG/100ML; MG/100ML; MG/100ML; MG/100ML
INJECTION, SOLUTION INTRAVENOUS CONTINUOUS
Status: DISCONTINUED | OUTPATIENT
Start: 2023-02-23 | End: 2023-03-01

## 2023-02-23 RX ADMIN — SODIUM CHLORIDE, SODIUM LACTATE, POTASSIUM CHLORIDE, CALCIUM CHLORIDE: 600; 310; 30; 20 INJECTION, SOLUTION INTRAVENOUS at 07:20:00

## 2023-02-23 RX ADMIN — SODIUM CHLORIDE, SODIUM LACTATE, POTASSIUM CHLORIDE, CALCIUM CHLORIDE: 600; 310; 30; 20 INJECTION, SOLUTION INTRAVENOUS at 08:05:00

## 2023-02-23 NOTE — OPERATIVE REPORT
EGD operative report  Patient Name: Sandor Kerr  Procedure: Esophagogastroduodenoscopy   Indication: iron def anemia  Attending: Marcin Menjivar M.D. Consent:  The risks, benefits, and alternatives were discussed with the patient / POA. Risks included, but were not limited to, bleeding, perforation, medication effects, cardiac arrhythmias, and aspiration. After all questions were answered to their satisfaction, a signed, informed, and witnessed consent was obtained. Sedation: Monitored Anesthesia Care  Monitoring:  Pulsoximetry, pulse, respirations, and blood pressure were monitored throughout the entire procedure  Procedure: After achieving adequate sedation and placing the patient in the left lateral decubitus position, the lubricated upper endoscope was introduced into the mouth and advanced to the descending duodenum. The endoscope was then withdrawn into the gastric antrum and placed in a retroflexed position. The endoscope was then righted, and air was suctioned from the stomach. The endoscope was then withdrawn from the patient, with careful visual inspection of the mucosa revealing no additional pathologic findings. The patient tolerated the procedure without apparent procedural complications. The patient left the procedure room in stable condition for recovery. Findings:    Esophagus: The mucosa was normal.   GE junction: Normal GE junction. There is no hiatal hernia. There is no esophagitis. Stomach: There is normal post-bypass anatomy. The surgical anastomosis is healthy appearing. There are no anastomotic ulcers. The anastomosis is widely patent. The gastric mucosa is normal.      Small bowel: The limbs of the small intestine were carefully examined. Impression: Findings as above  Recommendations:   1. No findings to explain anemia on this exam  2. IV iron therapy   3. See colonoscopy report  4.  No indication for repeat EGD at this time    Marcin Menjivar MD

## 2023-02-23 NOTE — DISCHARGE INSTRUCTIONS
Per Dr Ruby Yanez:  The upper endoscopy shows normal post-surgical anatomy. There are no ulcers or signs of inflammation. The colonoscopy did not reveal any polyps or masses. There were two small red lesions called angiodysplasias, which are known to intermittently ooze blood. These were cauterized. OK to resume your Eliquis trmw morning. Complete XR abdomen small bowel series (ordered at [facility]). Update blood work (including iron studies). Follow-up in office in 1-2 months.

## 2023-03-03 NOTE — OPERATIVE REPORT
Colon operative report  Date of Service: 2/23/23  Patient Name: Jose Coto  Procedure: Colonoscopy with APC  Indication: iron def anemia  Attending: Destin Bowser M.D. Consent: The risks, benefits, and alternatives were discussed with the patient / POA. Risks included, but were not limited to, bleeding, perforation, medication effects, cardiac arrhythmias, missed polyps, and aspiration. After all questions were answered to their satisfaction, a signed, informed, and witnessed consent was obtained. Sedation: Monitored Anesthesia Care  Monitoring: Pulsoximetry, pulse, respirations, and blood pressure were monitored throughout the entire procedure    Preparation Quality: adequate. [place] Bowel Prep Score: Right 3 / Transverse 3 / Left 3   Procedure: After achieving adequate sedation, and placing the patient in the left lateral decubitus position, a digital rectal examination was performed. The lubricated tip of the pediatric colonoscope was then introduced into the rectum and advanced to the terminal ileum. The appendiceal orifice and ileocecal valve were clearly and distinctly visualized, thus verifying the cecum. The terminal ileum was intubated and found to be normal to the extent examined. The endoscope was then carefully withdrawn from the patient with careful visualization of the colonic mucosa revealing no additional pathologic findings. Air was suctioned to the best of my ability, during withdrawal of the endoscope. When the endoscope reached the rectum, it was placed in a retroflexed position, and the rectal bulb was thus visualized. The endoscope was righted, and then removed from the patient. The patient tolerated the procedure without apparent procedural complications. The patient left the procedure room in stable condition for recovery. Findings:    1. The terminal ileum is normal.  2. There are 2 small angiodysplasias in in the ascending colon, cauterized with APC.   There was no bleeding before, during, or after the cautery. 3. The remainder of the colon is normal.  There are no polyps or masses in the colon. 4. Small to moderate internal hemorrhoids. Impression: Findings as above. Recommendations:   1. Avoid NSAIDs for 2 weeks  2. XR small bowel series  3. Repeat colonoscopy in 5 years  4.  Resume Eliquis in AM    Ernie Sanchez MD

## 2023-03-05 ENCOUNTER — LAB ENCOUNTER (OUTPATIENT)
Dept: LAB | Facility: HOSPITAL | Age: 68
End: 2023-03-05
Attending: STUDENT IN AN ORGANIZED HEALTH CARE EDUCATION/TRAINING PROGRAM
Payer: MEDICARE

## 2023-03-05 DIAGNOSIS — D50.9 IRON DEFICIENCY ANEMIA: ICD-10-CM

## 2023-03-05 DIAGNOSIS — Z01.818 PREOP TESTING: ICD-10-CM

## 2023-03-05 LAB
BASOPHILS # BLD AUTO: 0.06 X10(3) UL (ref 0–0.2)
BASOPHILS NFR BLD AUTO: 0.9 %
DEPRECATED HBV CORE AB SER IA-ACNC: 102.8 NG/ML
EOSINOPHIL # BLD AUTO: 0.16 X10(3) UL (ref 0–0.7)
EOSINOPHIL NFR BLD AUTO: 2.3 %
ERYTHROCYTE [DISTWIDTH] IN BLOOD BY AUTOMATED COUNT: 12.4 %
HCT VFR BLD AUTO: 40.5 %
HGB BLD-MCNC: 13.5 G/DL
IMM GRANULOCYTES # BLD AUTO: 0.02 X10(3) UL (ref 0–1)
IMM GRANULOCYTES NFR BLD: 0.3 %
IRON SATN MFR SERPL: 17 %
IRON SERPL-MCNC: 83 UG/DL
LYMPHOCYTES # BLD AUTO: 1.8 X10(3) UL (ref 1–4)
LYMPHOCYTES NFR BLD AUTO: 25.8 %
MCH RBC QN AUTO: 30.1 PG (ref 26–34)
MCHC RBC AUTO-ENTMCNC: 33.3 G/DL (ref 31–37)
MCV RBC AUTO: 90.2 FL
MONOCYTES # BLD AUTO: 0.85 X10(3) UL (ref 0.1–1)
MONOCYTES NFR BLD AUTO: 12.2 %
NEUTROPHILS # BLD AUTO: 4.09 X10 (3) UL (ref 1.5–7.7)
NEUTROPHILS # BLD AUTO: 4.09 X10(3) UL (ref 1.5–7.7)
NEUTROPHILS NFR BLD AUTO: 58.5 %
PLATELET # BLD AUTO: 195 10(3)UL (ref 150–450)
RBC # BLD AUTO: 4.49 X10(6)UL
SARS-COV-2 RNA RESP QL NAA+PROBE: NOT DETECTED
TIBC SERPL-MCNC: 480 UG/DL (ref 240–450)
TRANSFERRIN SERPL-MCNC: 322 MG/DL (ref 200–360)
WBC # BLD AUTO: 7 X10(3) UL (ref 4–11)

## 2023-03-05 PROCEDURE — 83540 ASSAY OF IRON: CPT

## 2023-03-05 PROCEDURE — 82728 ASSAY OF FERRITIN: CPT

## 2023-03-05 PROCEDURE — 85025 COMPLETE CBC W/AUTO DIFF WBC: CPT

## 2023-03-05 PROCEDURE — 83550 IRON BINDING TEST: CPT

## 2023-03-05 PROCEDURE — 36415 COLL VENOUS BLD VENIPUNCTURE: CPT

## 2023-03-09 ENCOUNTER — OFFICE VISIT (OUTPATIENT)
Dept: HEMATOLOGY/ONCOLOGY | Facility: HOSPITAL | Age: 68
End: 2023-03-09
Attending: INTERNAL MEDICINE
Payer: MEDICARE

## 2023-03-09 VITALS
HEART RATE: 110 BPM | TEMPERATURE: 98 F | DIASTOLIC BLOOD PRESSURE: 77 MMHG | OXYGEN SATURATION: 93 % | WEIGHT: 249 LBS | SYSTOLIC BLOOD PRESSURE: 155 MMHG | BODY MASS INDEX: 43 KG/M2 | RESPIRATION RATE: 20 BRPM

## 2023-03-09 DIAGNOSIS — D47.2 MGUS (MONOCLONAL GAMMOPATHY OF UNKNOWN SIGNIFICANCE): Primary | ICD-10-CM

## 2023-03-09 DIAGNOSIS — D50.0 IRON DEFICIENCY ANEMIA DUE TO CHRONIC BLOOD LOSS: ICD-10-CM

## 2023-03-09 PROCEDURE — 99214 OFFICE O/P EST MOD 30 MIN: CPT | Performed by: INTERNAL MEDICINE

## 2023-03-09 NOTE — PROGRESS NOTES
Education Record    Learner:  Patient    Disease / Diagnosis: MAYE    Barriers / Limitations:  None   Comments:    Method:  Discussion   Comments:    General Topics:  Plan of care reviewed   Comments:    Outcome:  Shows understanding   Comments: 7 month f/up. Pt reports feeling fatigued. States she had egd/colonoscopy 2 weeks ago and they found some bleeding in her colon. Looking to discuss labs done with Dr. Maxine Servin.

## 2023-03-15 ENCOUNTER — LAB ENCOUNTER (OUTPATIENT)
Dept: LAB | Age: 68
End: 2023-03-15
Attending: STUDENT IN AN ORGANIZED HEALTH CARE EDUCATION/TRAINING PROGRAM
Payer: MEDICARE

## 2023-03-15 DIAGNOSIS — E78.5 HYPERLIPEMIA: ICD-10-CM

## 2023-03-15 DIAGNOSIS — E11.9 DIABETES MELLITUS (HCC): ICD-10-CM

## 2023-03-15 DIAGNOSIS — Z00.00 ROUTINE GENERAL MEDICAL EXAMINATION AT A HEALTH CARE FACILITY: Primary | ICD-10-CM

## 2023-03-15 DIAGNOSIS — E11.9 DIABETES MELLITUS, TYPE II (HCC): ICD-10-CM

## 2023-03-15 DIAGNOSIS — Z00.00 ROUTINE GENERAL MEDICAL EXAMINATION AT A HEALTH CARE FACILITY: ICD-10-CM

## 2023-03-15 DIAGNOSIS — E78.00 PURE HYPERCHOLESTEROLEMIA: ICD-10-CM

## 2023-03-15 DIAGNOSIS — Z01.818 PRE-OP TESTING: ICD-10-CM

## 2023-03-15 DIAGNOSIS — E03.9 ACQUIRED HYPOTHYROIDISM: Primary | ICD-10-CM

## 2023-03-15 DIAGNOSIS — E55.9 AVITAMINOSIS D: ICD-10-CM

## 2023-03-15 LAB
ALBUMIN SERPL-MCNC: 3.6 G/DL (ref 3.4–5)
ALBUMIN/GLOB SERPL: 1 {RATIO} (ref 1–2)
ALP LIVER SERPL-CCNC: 83 U/L
ALT SERPL-CCNC: 43 U/L
ANION GAP SERPL CALC-SCNC: 6 MMOL/L (ref 0–18)
AST SERPL-CCNC: 31 U/L (ref 15–37)
BASOPHILS # BLD AUTO: 0.04 X10(3) UL (ref 0–0.2)
BASOPHILS NFR BLD AUTO: 0.5 %
BILIRUB SERPL-MCNC: 0.5 MG/DL (ref 0.1–2)
BILIRUB UR QL STRIP.AUTO: NEGATIVE
BUN BLD-MCNC: 28 MG/DL (ref 7–18)
CALCIUM BLD-MCNC: 8.7 MG/DL (ref 8.5–10.1)
CHLORIDE SERPL-SCNC: 104 MMOL/L (ref 98–112)
CHOLEST SERPL-MCNC: 114 MG/DL (ref ?–200)
CLARITY UR REFRACT.AUTO: CLEAR
CO2 SERPL-SCNC: 29 MMOL/L (ref 21–32)
CREAT BLD-MCNC: 0.97 MG/DL
CREAT UR-SCNC: <13 MG/DL
EOSINOPHIL # BLD AUTO: 0.11 X10(3) UL (ref 0–0.7)
EOSINOPHIL NFR BLD AUTO: 1.4 %
ERYTHROCYTE [DISTWIDTH] IN BLOOD BY AUTOMATED COUNT: 12.9 %
EST. AVERAGE GLUCOSE BLD GHB EST-MCNC: 134 MG/DL (ref 68–126)
FASTING PATIENT LIPID ANSWER: YES
FASTING STATUS PATIENT QL REPORTED: YES
GFR SERPLBLD BASED ON 1.73 SQ M-ARVRAT: 64 ML/MIN/1.73M2 (ref 60–?)
GLOBULIN PLAS-MCNC: 3.6 G/DL (ref 2.8–4.4)
GLUCOSE BLD-MCNC: 107 MG/DL (ref 70–99)
GLUCOSE UR STRIP.AUTO-MCNC: NEGATIVE MG/DL
HBA1C MFR BLD: 6.3 % (ref ?–5.7)
HCT VFR BLD AUTO: 44.3 %
HDLC SERPL-MCNC: 44 MG/DL (ref 40–59)
HGB BLD-MCNC: 14.2 G/DL
IMM GRANULOCYTES # BLD AUTO: 0.03 X10(3) UL (ref 0–1)
IMM GRANULOCYTES NFR BLD: 0.4 %
KETONES UR STRIP.AUTO-MCNC: NEGATIVE MG/DL
LDLC SERPL CALC-MCNC: 41 MG/DL (ref ?–100)
LYMPHOCYTES # BLD AUTO: 1.53 X10(3) UL (ref 1–4)
LYMPHOCYTES NFR BLD AUTO: 19.9 %
MCH RBC QN AUTO: 29.1 PG (ref 26–34)
MCHC RBC AUTO-ENTMCNC: 32.1 G/DL (ref 31–37)
MCV RBC AUTO: 90.8 FL
MICROALBUMIN UR-MCNC: 0.57 MG/DL
MONOCYTES # BLD AUTO: 0.73 X10(3) UL (ref 0.1–1)
MONOCYTES NFR BLD AUTO: 9.5 %
NEUTROPHILS # BLD AUTO: 5.25 X10 (3) UL (ref 1.5–7.7)
NEUTROPHILS # BLD AUTO: 5.25 X10(3) UL (ref 1.5–7.7)
NEUTROPHILS NFR BLD AUTO: 68.3 %
NITRITE UR QL STRIP.AUTO: NEGATIVE
NONHDLC SERPL-MCNC: 70 MG/DL (ref ?–130)
OSMOLALITY SERPL CALC.SUM OF ELEC: 294 MOSM/KG (ref 275–295)
PH UR STRIP.AUTO: 6 [PH] (ref 5–8)
PLATELET # BLD AUTO: 202 10(3)UL (ref 150–450)
POTASSIUM SERPL-SCNC: 3.8 MMOL/L (ref 3.5–5.1)
PROT SERPL-MCNC: 7.2 G/DL (ref 6.4–8.2)
PROT UR STRIP.AUTO-MCNC: NEGATIVE MG/DL
RBC # BLD AUTO: 4.88 X10(6)UL
RBC UR QL AUTO: NEGATIVE
SODIUM SERPL-SCNC: 139 MMOL/L (ref 136–145)
SP GR UR STRIP.AUTO: 1.01 (ref 1–1.03)
T4 FREE SERPL-MCNC: 1.2 NG/DL (ref 0.8–1.7)
TRIGL SERPL-MCNC: 175 MG/DL (ref 30–149)
TSI SER-ACNC: 2.71 MIU/ML (ref 0.36–3.74)
UROBILINOGEN UR STRIP.AUTO-MCNC: <2 MG/DL
VIT D+METAB SERPL-MCNC: 42.2 NG/ML (ref 30–100)
VLDLC SERPL CALC-MCNC: 24 MG/DL (ref 0–30)
WBC # BLD AUTO: 7.7 X10(3) UL (ref 4–11)

## 2023-03-15 PROCEDURE — 85025 COMPLETE CBC W/AUTO DIFF WBC: CPT

## 2023-03-15 PROCEDURE — 87086 URINE CULTURE/COLONY COUNT: CPT

## 2023-03-15 PROCEDURE — 36415 COLL VENOUS BLD VENIPUNCTURE: CPT

## 2023-03-15 PROCEDURE — 84443 ASSAY THYROID STIM HORMONE: CPT

## 2023-03-15 PROCEDURE — 82043 UR ALBUMIN QUANTITATIVE: CPT

## 2023-03-15 PROCEDURE — 87186 SC STD MICRODIL/AGAR DIL: CPT

## 2023-03-15 PROCEDURE — 87077 CULTURE AEROBIC IDENTIFY: CPT

## 2023-03-15 PROCEDURE — 80053 COMPREHEN METABOLIC PANEL: CPT

## 2023-03-15 PROCEDURE — 81001 URINALYSIS AUTO W/SCOPE: CPT

## 2023-03-15 PROCEDURE — 83036 HEMOGLOBIN GLYCOSYLATED A1C: CPT

## 2023-03-15 PROCEDURE — 80061 LIPID PANEL: CPT

## 2023-03-15 PROCEDURE — 84439 ASSAY OF FREE THYROXINE: CPT

## 2023-03-15 PROCEDURE — 82570 ASSAY OF URINE CREATININE: CPT

## 2023-03-15 PROCEDURE — 82306 VITAMIN D 25 HYDROXY: CPT

## 2023-03-16 LAB — SARS-COV-2 RNA RESP QL NAA+PROBE: NOT DETECTED

## 2023-03-18 ENCOUNTER — HOSPITAL ENCOUNTER (OUTPATIENT)
Dept: GENERAL RADIOLOGY | Facility: HOSPITAL | Age: 68
Discharge: HOME | End: 2023-03-18
Attending: STUDENT IN AN ORGANIZED HEALTH CARE EDUCATION/TRAINING PROGRAM
Payer: MEDICARE

## 2023-03-18 DIAGNOSIS — R10.13 EPIGASTRIC PAIN: ICD-10-CM

## 2023-03-18 DIAGNOSIS — D50.9 IRON DEFICIENCY ANEMIA: ICD-10-CM

## 2023-03-18 DIAGNOSIS — Z98.84 HISTORY OF GASTRIC BYPASS: ICD-10-CM

## 2023-03-18 PROCEDURE — 74246 X-RAY XM UPR GI TRC 2CNTRST: CPT | Performed by: STUDENT IN AN ORGANIZED HEALTH CARE EDUCATION/TRAINING PROGRAM

## 2023-03-18 PROCEDURE — 74248 X-RAY SM INT F-THRU STD: CPT | Performed by: STUDENT IN AN ORGANIZED HEALTH CARE EDUCATION/TRAINING PROGRAM

## 2023-03-20 PROBLEM — R07.2 PRECORDIAL PAIN: Status: ACTIVE | Noted: 2021-02-12

## 2023-03-20 PROBLEM — R93.1 ABNORMAL ECHOCARDIOGRAM: Status: ACTIVE | Noted: 2021-02-12

## 2023-03-20 PROBLEM — I35.0 NONRHEUMATIC AORTIC VALVE STENOSIS: Status: ACTIVE | Noted: 2021-02-12

## 2023-03-20 PROBLEM — R06.09 DYSPNEA ON EXERTION: Status: ACTIVE | Noted: 2021-02-12

## 2023-03-20 PROBLEM — E78.00 HYPERCHOLESTEROLEMIA: Status: ACTIVE | Noted: 2021-02-12

## 2023-04-14 ENCOUNTER — OFFICE VISIT (OUTPATIENT)
Dept: NEUROLOGY | Facility: CLINIC | Age: 68
End: 2023-04-14
Payer: COMMERCIAL

## 2023-04-14 VITALS
WEIGHT: 254 LBS | BODY MASS INDEX: 44 KG/M2 | SYSTOLIC BLOOD PRESSURE: 110 MMHG | RESPIRATION RATE: 16 BRPM | DIASTOLIC BLOOD PRESSURE: 70 MMHG | HEART RATE: 62 BPM

## 2023-04-14 DIAGNOSIS — Z98.84 HISTORY OF GASTRIC BYPASS: ICD-10-CM

## 2023-04-14 DIAGNOSIS — Z90.49 HISTORY OF BOWEL RESECTION: ICD-10-CM

## 2023-04-14 DIAGNOSIS — G62.9 POLYNEUROPATHY: Primary | ICD-10-CM

## 2023-04-14 DIAGNOSIS — G43.011 INTRACTABLE MIGRAINE WITHOUT AURA AND WITH STATUS MIGRAINOSUS: ICD-10-CM

## 2023-04-14 DIAGNOSIS — M54.17 LUMBOSACRAL RADICULOPATHY: ICD-10-CM

## 2023-04-14 PROCEDURE — 3078F DIAST BP <80 MM HG: CPT | Performed by: OTHER

## 2023-04-14 PROCEDURE — 99213 OFFICE O/P EST LOW 20 MIN: CPT | Performed by: OTHER

## 2023-04-14 PROCEDURE — 3074F SYST BP LT 130 MM HG: CPT | Performed by: OTHER

## 2023-04-14 RX ORDER — BUTALBITAL, ACETAMINOPHEN AND CAFFEINE 50; 325; 40 MG/1; MG/1; MG/1
TABLET ORAL
Qty: 15 TABLET | Refills: 0 | Status: SHIPPED | OUTPATIENT
Start: 2023-04-14

## 2023-04-14 RX ORDER — NORTRIPTYLINE HYDROCHLORIDE 10 MG/1
CAPSULE ORAL
Qty: 60 CAPSULE | Refills: 1 | Status: SHIPPED | OUTPATIENT
Start: 2023-04-14

## 2023-04-14 NOTE — PROGRESS NOTES
Patient states she lost feeling in feet and Lyrica is not helping. She said she was told her nerves were compressed.

## 2023-04-18 ENCOUNTER — TELEPHONE (OUTPATIENT)
Dept: NEUROLOGY | Facility: CLINIC | Age: 68
End: 2023-04-18

## 2023-04-18 ENCOUNTER — HOSPITAL ENCOUNTER (OUTPATIENT)
Dept: ULTRASOUND IMAGING | Facility: HOSPITAL | Age: 68
Discharge: HOME | End: 2023-04-18
Attending: INTERNAL MEDICINE
Payer: MEDICARE

## 2023-04-18 DIAGNOSIS — I73.9 PERIPHERAL VASCULAR DISEASE, UNSPECIFIED (HCC): ICD-10-CM

## 2023-04-18 PROCEDURE — 93923 UPR/LXTR ART STDY 3+ LVLS: CPT | Performed by: INTERNAL MEDICINE

## 2023-04-18 NOTE — TELEPHONE ENCOUNTER
[facility] calling to discuss poss drug interaction with nortriptyline 10 MG Oral  as pt has a script for sertraline 50 MG Oral Tab(different prescriber)Transferred call to nurse.

## 2023-04-19 ENCOUNTER — HOSPITAL ENCOUNTER (OUTPATIENT)
Dept: ULTRASOUND IMAGING | Age: 68
Discharge: HOME | End: 2023-04-19
Attending: INTERNAL MEDICINE
Payer: MEDICARE

## 2023-04-19 ENCOUNTER — HOSPITAL ENCOUNTER (OUTPATIENT)
Dept: MAMMOGRAPHY | Age: 68
Discharge: HOME | End: 2023-04-19
Attending: INTERNAL MEDICINE
Payer: MEDICARE

## 2023-04-19 DIAGNOSIS — I87.2 VENOUS INSUFFICIENCY (CHRONIC) (PERIPHERAL): ICD-10-CM

## 2023-04-19 DIAGNOSIS — Z12.31 ENCOUNTER FOR SCREENING MAMMOGRAM FOR MALIGNANT NEOPLASM OF BREAST: ICD-10-CM

## 2023-04-19 PROCEDURE — 77063 BREAST TOMOSYNTHESIS BI: CPT | Performed by: INTERNAL MEDICINE

## 2023-04-19 PROCEDURE — 93970 EXTREMITY STUDY: CPT | Performed by: INTERNAL MEDICINE

## 2023-04-19 PROCEDURE — 77067 SCR MAMMO BI INCL CAD: CPT | Performed by: INTERNAL MEDICINE

## 2023-04-20 ENCOUNTER — HOSPITAL ENCOUNTER (OUTPATIENT)
Dept: ULTRASOUND IMAGING | Age: 68
Discharge: HOME | End: 2023-04-20
Attending: INTERNAL MEDICINE
Payer: MEDICARE

## 2023-04-20 ENCOUNTER — APPOINTMENT (OUTPATIENT)
Dept: ULTRASOUND IMAGING | Age: 68
End: 2023-04-20
Attending: INTERNAL MEDICINE
Payer: MEDICARE

## 2023-04-20 ENCOUNTER — HOSPITAL ENCOUNTER (OUTPATIENT)
Dept: CT IMAGING | Age: 68
Discharge: HOME | End: 2023-04-20
Payer: MEDICARE

## 2023-04-20 DIAGNOSIS — R10.33 PERIUMBILICAL PAIN: ICD-10-CM

## 2023-04-20 DIAGNOSIS — R10.10 UPPER ABDOMINAL PAIN: ICD-10-CM

## 2023-04-20 DIAGNOSIS — R11.0 NAUSEA: ICD-10-CM

## 2023-04-20 LAB
CREAT BLD-MCNC: 1.1 MG/DL
GFR SERPLBLD BASED ON 1.73 SQ M-ARVRAT: 55 ML/MIN/1.73M2 (ref 60–?)

## 2023-04-20 PROCEDURE — 76705 ECHO EXAM OF ABDOMEN: CPT | Performed by: INTERNAL MEDICINE

## 2023-04-20 PROCEDURE — 74174 CTA ABD&PLVS W/CONTRAST: CPT

## 2023-04-20 PROCEDURE — 93976 VASCULAR STUDY: CPT | Performed by: INTERNAL MEDICINE

## 2023-04-20 PROCEDURE — 82565 ASSAY OF CREATININE: CPT

## 2023-04-24 NOTE — PROGRESS NOTES
Results reviewed and dicussed with the patient over the phone. CTA showed Moderate calcific plaque involving the aorta and common iliac arteries bilaterally as well as the origin of the celiac, SMA and left main renal artery. Recommend to follow up with her primary care about it. She verbalizes understanding.

## 2023-04-27 ENCOUNTER — HOSPITAL ENCOUNTER (OUTPATIENT)
Dept: NUCLEAR MEDICINE | Facility: HOSPITAL | Age: 68
Discharge: HOME | End: 2023-04-27
Payer: MEDICARE

## 2023-04-27 DIAGNOSIS — R11.0 NAUSEA: ICD-10-CM

## 2023-04-27 DIAGNOSIS — R14.0 BLOATING: ICD-10-CM

## 2023-04-27 PROCEDURE — 78264 GASTRIC EMPTYING IMG STUDY: CPT

## 2023-04-28 RX ORDER — CEFAZOLIN SODIUM/WATER 2 G/20 ML
2 SYRINGE (ML) INTRAVENOUS ONCE
Status: CANCELLED | OUTPATIENT
Start: 2023-04-28 | End: 2023-04-28

## 2023-05-01 NOTE — PROGRESS NOTES
Results were reviewed and discussed with the patient over the bone. GES showed 4 hour gastric emptying is mildly below normal. Recommend life style modification with smaller frequent meals, avoiding fatty meals, and avoiding high-fiber meals. The patient verbalizes understanding and agrees with the plan.

## 2023-05-22 ENCOUNTER — HOSPITAL ENCOUNTER (OUTPATIENT)
Dept: LAB | Facility: HOSPITAL | Age: 68
Discharge: HOME | End: 2023-05-22
Attending: ORTHOPAEDIC SURGERY
Payer: MEDICARE

## 2023-05-22 ENCOUNTER — HOSPITAL ENCOUNTER (OUTPATIENT)
Dept: CV DIAGNOSTICS | Facility: HOSPITAL | Age: 68
Discharge: HOME | End: 2023-05-22
Attending: ORTHOPAEDIC SURGERY
Payer: MEDICARE

## 2023-05-22 ENCOUNTER — HOSPITAL ENCOUNTER (OUTPATIENT)
Dept: PHYSICAL THERAPY | Facility: HOSPITAL | Age: 68
Discharge: HOME | End: 2023-05-22
Attending: ORTHOPAEDIC SURGERY
Payer: MEDICARE

## 2023-05-22 DIAGNOSIS — Z01.818 PRE-OP TESTING: ICD-10-CM

## 2023-05-22 LAB
ALBUMIN SERPL-MCNC: 3.6 G/DL (ref 3.4–5)
ALBUMIN/GLOB SERPL: 1 {RATIO} (ref 1–2)
ALP LIVER SERPL-CCNC: 81 U/L
ALT SERPL-CCNC: 32 U/L
ANION GAP SERPL CALC-SCNC: 6 MMOL/L (ref 0–18)
ANTIBODY SCREEN: NEGATIVE
AST SERPL-CCNC: 33 U/L (ref 15–37)
ATRIAL RATE: 60 BPM
BILIRUB SERPL-MCNC: 0.4 MG/DL (ref 0.1–2)
BUN BLD-MCNC: 25 MG/DL (ref 7–18)
CALCIUM BLD-MCNC: 8.9 MG/DL (ref 8.5–10.1)
CHLORIDE SERPL-SCNC: 107 MMOL/L (ref 98–112)
CO2 SERPL-SCNC: 23 MMOL/L (ref 21–32)
CREAT BLD-MCNC: 1.03 MG/DL
FASTING STATUS PATIENT QL REPORTED: NO
GFR SERPLBLD BASED ON 1.73 SQ M-ARVRAT: 60 ML/MIN/1.73M2 (ref 60–?)
GLOBULIN PLAS-MCNC: 3.5 G/DL (ref 2.8–4.4)
GLUCOSE BLD-MCNC: 96 MG/DL (ref 70–99)
OSMOLALITY SERPL CALC.SUM OF ELEC: 286 MOSM/KG (ref 275–295)
P AXIS: 44 DEGREES
P-R INTERVAL: 162 MS
POTASSIUM SERPL-SCNC: 4.5 MMOL/L (ref 3.5–5.1)
PROT SERPL-MCNC: 7.1 G/DL (ref 6.4–8.2)
Q-T INTERVAL: 420 MS
QRS DURATION: 86 MS
QTC CALCULATION (BEZET): 420 MS
R AXIS: 8 DEGREES
RH BLOOD TYPE: POSITIVE
SODIUM SERPL-SCNC: 136 MMOL/L (ref 136–145)
T AXIS: 60 DEGREES
VENTRICULAR RATE: 60 BPM

## 2023-05-22 PROCEDURE — 80053 COMPREHEN METABOLIC PANEL: CPT | Performed by: ORTHOPAEDIC SURGERY

## 2023-05-22 PROCEDURE — 86901 BLOOD TYPING SEROLOGIC RH(D): CPT | Performed by: ORTHOPAEDIC SURGERY

## 2023-05-22 PROCEDURE — 87081 CULTURE SCREEN ONLY: CPT | Performed by: ORTHOPAEDIC SURGERY

## 2023-05-22 PROCEDURE — 93005 ELECTROCARDIOGRAM TRACING: CPT

## 2023-05-22 PROCEDURE — 93010 ELECTROCARDIOGRAM REPORT: CPT | Performed by: INTERNAL MEDICINE

## 2023-05-22 PROCEDURE — 86850 RBC ANTIBODY SCREEN: CPT | Performed by: ORTHOPAEDIC SURGERY

## 2023-05-22 PROCEDURE — 86900 BLOOD TYPING SEROLOGIC ABO: CPT | Performed by: ORTHOPAEDIC SURGERY

## 2023-05-22 PROCEDURE — 36415 COLL VENOUS BLD VENIPUNCTURE: CPT | Performed by: ORTHOPAEDIC SURGERY

## 2023-06-01 ENCOUNTER — ANESTHESIA EVENT (OUTPATIENT)
Dept: SURGERY | Facility: HOSPITAL | Age: 68
End: 2023-06-01
Payer: MEDICARE

## 2023-06-05 ENCOUNTER — APPOINTMENT (OUTPATIENT)
Dept: GENERAL RADIOLOGY | Facility: HOSPITAL | Age: 68
End: 2023-06-05
Attending: PHYSICIAN ASSISTANT
Payer: MEDICARE

## 2023-06-05 ENCOUNTER — HOSPITAL ENCOUNTER (INPATIENT)
Facility: HOSPITAL | Age: 68
LOS: 3 days | Discharge: NURSING, REHAB OR LONG-TERM CARE | End: 2023-06-08
Attending: ORTHOPAEDIC SURGERY | Admitting: ORTHOPAEDIC SURGERY
Payer: MEDICARE

## 2023-06-05 ENCOUNTER — HOSPITAL ENCOUNTER (INPATIENT)
Facility: HOSPITAL | Age: 68
LOS: 3 days | Discharge: NURSING, REHAB OR LONG-TERM CARE | DRG: 467 | End: 2023-06-08
Attending: ORTHOPAEDIC SURGERY | Admitting: ORTHOPAEDIC SURGERY
Payer: MEDICARE

## 2023-06-05 ENCOUNTER — HOSPITAL ENCOUNTER (OUTPATIENT)
Facility: HOSPITAL | Age: 68
Discharge: NURSING, REHAB OR LONG-TERM CARE | DRG: 467 | End: 2023-06-08
Attending: ORTHOPAEDIC SURGERY | Admitting: ORTHOPAEDIC SURGERY
Payer: MEDICARE

## 2023-06-05 ENCOUNTER — APPOINTMENT (OUTPATIENT)
Dept: GENERAL RADIOLOGY | Facility: HOSPITAL | Age: 68
DRG: 467 | End: 2023-06-05
Attending: PHYSICIAN ASSISTANT
Payer: MEDICARE

## 2023-06-05 ENCOUNTER — ANESTHESIA (OUTPATIENT)
Dept: SURGERY | Facility: HOSPITAL | Age: 68
End: 2023-06-05
Payer: MEDICARE

## 2023-06-05 DIAGNOSIS — Z01.818 PRE-OP TESTING: Primary | ICD-10-CM

## 2023-06-05 LAB
CREAT BLD-MCNC: 0.85 MG/DL
GFR SERPLBLD BASED ON 1.73 SQ M-ARVRAT: 75 ML/MIN/1.73M2 (ref 60–?)
GLUCOSE BLD-MCNC: 108 MG/DL (ref 70–99)
GLUCOSE BLD-MCNC: 148 MG/DL (ref 70–99)
GLUCOSE BLD-MCNC: 151 MG/DL (ref 70–99)
GLUCOSE BLD-MCNC: 260 MG/DL (ref 70–99)

## 2023-06-05 PROCEDURE — 0SPC0JZ REMOVAL OF SYNTHETIC SUBSTITUTE FROM RIGHT KNEE JOINT, OPEN APPROACH: ICD-10-PCS | Performed by: ORTHOPAEDIC SURGERY

## 2023-06-05 PROCEDURE — 76942 ECHO GUIDE FOR BIOPSY: CPT | Performed by: ANESTHESIOLOGY

## 2023-06-05 PROCEDURE — 3E0T3BZ INTRODUCTION OF ANESTHETIC AGENT INTO PERIPHERAL NERVES AND PLEXI, PERCUTANEOUS APPROACH: ICD-10-PCS | Performed by: ANESTHESIOLOGY

## 2023-06-05 PROCEDURE — 73560 X-RAY EXAM OF KNEE 1 OR 2: CPT | Performed by: PHYSICIAN ASSISTANT

## 2023-06-05 PROCEDURE — 0SRC0J9 REPLACEMENT OF RIGHT KNEE JOINT WITH SYNTHETIC SUBSTITUTE, CEMENTED, OPEN APPROACH: ICD-10-PCS | Performed by: ORTHOPAEDIC SURGERY

## 2023-06-05 DEVICE — ATTUNE KNEE SYSTEM REVISION PRESSFIT STEM 16X60MM
Type: IMPLANTABLE DEVICE | Site: KNEE | Status: FUNCTIONAL
Brand: ATTUNE

## 2023-06-05 DEVICE — ATTUNE KNEE SYSTEM REVISION TIBIAL SLEEVE POROCOAT FULLY COATED 37MM
Type: IMPLANTABLE DEVICE | Site: KNEE | Status: FUNCTIONAL
Brand: ATTUNE

## 2023-06-05 DEVICE — SMARTSET GMV HIGH PERFORMANCE GENTAMICIN MEDIUM VISCOSITY BONE CEMENT 40G
Type: IMPLANTABLE DEVICE | Site: KNEE | Status: FUNCTIONAL
Brand: SMARTSET

## 2023-06-05 DEVICE — ATTUNE KNEE SYSTEM REVISION CRS ROTATING PLATFORM INSERT AOX 6MM SIZE 5
Type: IMPLANTABLE DEVICE | Site: KNEE | Status: FUNCTIONAL
Brand: ATTUNE

## 2023-06-05 DEVICE — ATTUNE KNEE SYSTEM REVISION POSTERIOR FEMORAL AUGMENT 8MM CEMENTED SIZE 5
Type: IMPLANTABLE DEVICE | Site: KNEE | Status: FUNCTIONAL
Brand: ATTUNE

## 2023-06-05 DEVICE — ATTUNE KNEE SYSTEM REVISION ROTATING PLATFORM TIBIAL BASE CEMENTED SIZE 4
Type: IMPLANTABLE DEVICE | Site: KNEE | Status: FUNCTIONAL
Brand: ATTUNE

## 2023-06-05 DEVICE — ATTUNE KNEE SYSTEM REVISION CRS FEMORAL CEMENTED RIGHT SIZE 5
Type: IMPLANTABLE DEVICE | Site: KNEE | Status: FUNCTIONAL
Brand: ATTUNE

## 2023-06-05 DEVICE — ATTUNE KNEE SYSTEM REVISION PRESSFIT STEM 16X110MM
Type: IMPLANTABLE DEVICE | Site: KNEE | Status: FUNCTIONAL
Brand: ATTUNE

## 2023-06-05 RX ORDER — ROPIVACAINE HYDROCHLORIDE 5 MG/ML
INJECTION, SOLUTION EPIDURAL; INFILTRATION; PERINEURAL AS NEEDED
Status: DISCONTINUED | OUTPATIENT
Start: 2023-06-05 | End: 2023-06-05 | Stop reason: SURG

## 2023-06-05 RX ORDER — HYDROMORPHONE HYDROCHLORIDE 1 MG/ML
0.4 INJECTION, SOLUTION INTRAMUSCULAR; INTRAVENOUS; SUBCUTANEOUS EVERY 5 MIN PRN
Status: DISCONTINUED | OUTPATIENT
Start: 2023-06-05 | End: 2023-06-05 | Stop reason: HOSPADM

## 2023-06-05 RX ORDER — OXYCODONE HYDROCHLORIDE 5 MG/1
2.5 TABLET ORAL EVERY 4 HOURS PRN
Status: DISCONTINUED | OUTPATIENT
Start: 2023-06-05 | End: 2023-06-08

## 2023-06-05 RX ORDER — ONDANSETRON 2 MG/ML
INJECTION INTRAMUSCULAR; INTRAVENOUS AS NEEDED
Status: DISCONTINUED | OUTPATIENT
Start: 2023-06-05 | End: 2023-06-05 | Stop reason: SURG

## 2023-06-05 RX ORDER — DIPHENHYDRAMINE HYDROCHLORIDE 50 MG/ML
25 INJECTION INTRAMUSCULAR; INTRAVENOUS ONCE AS NEEDED
Status: ACTIVE | OUTPATIENT
Start: 2023-06-05 | End: 2023-06-05

## 2023-06-05 RX ORDER — ROCURONIUM BROMIDE 10 MG/ML
INJECTION, SOLUTION INTRAVENOUS AS NEEDED
Status: DISCONTINUED | OUTPATIENT
Start: 2023-06-05 | End: 2023-06-05 | Stop reason: SURG

## 2023-06-05 RX ORDER — CEFAZOLIN SODIUM/WATER 2 G/20 ML
2 SYRINGE (ML) INTRAVENOUS EVERY 8 HOURS
Status: DISCONTINUED | OUTPATIENT
Start: 2023-06-05 | End: 2023-06-05

## 2023-06-05 RX ORDER — PREGABALIN 100 MG/1
100 CAPSULE ORAL 3 TIMES DAILY
Status: DISCONTINUED | OUTPATIENT
Start: 2023-06-05 | End: 2023-06-05

## 2023-06-05 RX ORDER — PANTOPRAZOLE SODIUM 40 MG/1
40 TABLET, DELAYED RELEASE ORAL
Status: DISCONTINUED | OUTPATIENT
Start: 2023-06-06 | End: 2023-06-08

## 2023-06-05 RX ORDER — LOSARTAN POTASSIUM 25 MG/1
25 TABLET ORAL DAILY
Status: DISCONTINUED | OUTPATIENT
Start: 2023-06-06 | End: 2023-06-08

## 2023-06-05 RX ORDER — METOCLOPRAMIDE HYDROCHLORIDE 5 MG/ML
10 INJECTION INTRAMUSCULAR; INTRAVENOUS EVERY 8 HOURS PRN
Status: DISCONTINUED | OUTPATIENT
Start: 2023-06-05 | End: 2023-06-08

## 2023-06-05 RX ORDER — 0.9 % SODIUM CHLORIDE 0.9 %
INTRAVENOUS SOLUTION INTRAVENOUS
Status: DISCONTINUED
Start: 2023-06-05 | End: 2023-06-05 | Stop reason: WASHOUT

## 2023-06-05 RX ORDER — PREGABALIN 100 MG/1
100 CAPSULE ORAL 2 TIMES DAILY
Status: DISCONTINUED | OUTPATIENT
Start: 2023-06-06 | End: 2023-06-08

## 2023-06-05 RX ORDER — POLYETHYLENE GLYCOL 3350 17 G/17G
17 POWDER, FOR SOLUTION ORAL DAILY PRN
Status: DISCONTINUED | OUTPATIENT
Start: 2023-06-05 | End: 2023-06-08

## 2023-06-05 RX ORDER — LIDOCAINE HYDROCHLORIDE 10 MG/ML
INJECTION, SOLUTION EPIDURAL; INFILTRATION; INTRACAUDAL; PERINEURAL AS NEEDED
Status: DISCONTINUED | OUTPATIENT
Start: 2023-06-05 | End: 2023-06-05 | Stop reason: SURG

## 2023-06-05 RX ORDER — LABETALOL HYDROCHLORIDE 5 MG/ML
5 INJECTION, SOLUTION INTRAVENOUS EVERY 5 MIN PRN
Status: DISCONTINUED | OUTPATIENT
Start: 2023-06-05 | End: 2023-06-05 | Stop reason: HOSPADM

## 2023-06-05 RX ORDER — HYDROMORPHONE HYDROCHLORIDE 1 MG/ML
INJECTION, SOLUTION INTRAMUSCULAR; INTRAVENOUS; SUBCUTANEOUS
Status: COMPLETED
Start: 2023-06-05 | End: 2023-06-05

## 2023-06-05 RX ORDER — HYDROMORPHONE HYDROCHLORIDE 1 MG/ML
0.4 INJECTION, SOLUTION INTRAMUSCULAR; INTRAVENOUS; SUBCUTANEOUS EVERY 2 HOUR PRN
Status: DISCONTINUED | OUTPATIENT
Start: 2023-06-05 | End: 2023-06-08

## 2023-06-05 RX ORDER — DIPHENHYDRAMINE HCL 25 MG
25 CAPSULE ORAL EVERY 4 HOURS PRN
Status: DISCONTINUED | OUTPATIENT
Start: 2023-06-05 | End: 2023-06-08

## 2023-06-05 RX ORDER — NICOTINE POLACRILEX 4 MG
15 LOZENGE BUCCAL
Status: DISCONTINUED | OUTPATIENT
Start: 2023-06-05 | End: 2023-06-08

## 2023-06-05 RX ORDER — SODIUM CHLORIDE, SODIUM LACTATE, POTASSIUM CHLORIDE, CALCIUM CHLORIDE 600; 310; 30; 20 MG/100ML; MG/100ML; MG/100ML; MG/100ML
INJECTION, SOLUTION INTRAVENOUS CONTINUOUS
Status: DISCONTINUED | OUTPATIENT
Start: 2023-06-05 | End: 2023-06-05 | Stop reason: HOSPADM

## 2023-06-05 RX ORDER — KETOROLAC TROMETHAMINE 15 MG/ML
15 INJECTION, SOLUTION INTRAMUSCULAR; INTRAVENOUS EVERY 6 HOURS
Status: COMPLETED | OUTPATIENT
Start: 2023-06-05 | End: 2023-06-06

## 2023-06-05 RX ORDER — NALOXONE HYDROCHLORIDE 0.4 MG/ML
80 INJECTION, SOLUTION INTRAMUSCULAR; INTRAVENOUS; SUBCUTANEOUS AS NEEDED
Status: DISCONTINUED | OUTPATIENT
Start: 2023-06-05 | End: 2023-06-05 | Stop reason: HOSPADM

## 2023-06-05 RX ORDER — CEFAZOLIN SODIUM/WATER 2 G/20 ML
2 SYRINGE (ML) INTRAVENOUS EVERY 8 HOURS
Status: COMPLETED | OUTPATIENT
Start: 2023-06-05 | End: 2023-06-06

## 2023-06-05 RX ORDER — INSULIN ASPART 100 [IU]/ML
INJECTION, SOLUTION INTRAVENOUS; SUBCUTANEOUS ONCE
Status: DISCONTINUED | OUTPATIENT
Start: 2023-06-05 | End: 2023-06-05 | Stop reason: HOSPADM

## 2023-06-05 RX ORDER — MIDAZOLAM HYDROCHLORIDE 1 MG/ML
INJECTION INTRAMUSCULAR; INTRAVENOUS AS NEEDED
Status: DISCONTINUED | OUTPATIENT
Start: 2023-06-05 | End: 2023-06-05 | Stop reason: SURG

## 2023-06-05 RX ORDER — SENNOSIDES 8.6 MG
17.2 TABLET ORAL NIGHTLY
Status: DISCONTINUED | OUTPATIENT
Start: 2023-06-05 | End: 2023-06-08

## 2023-06-05 RX ORDER — ACETAMINOPHEN 325 MG/1
650 TABLET ORAL 4 TIMES DAILY
Status: DISCONTINUED | OUTPATIENT
Start: 2023-06-05 | End: 2023-06-08

## 2023-06-05 RX ORDER — DOCUSATE SODIUM 100 MG/1
100 CAPSULE, LIQUID FILLED ORAL 2 TIMES DAILY
Status: DISCONTINUED | OUTPATIENT
Start: 2023-06-05 | End: 2023-06-08

## 2023-06-05 RX ORDER — TRANEXAMIC ACID 10 MG/ML
INJECTION, SOLUTION INTRAVENOUS AS NEEDED
Status: DISCONTINUED | OUTPATIENT
Start: 2023-06-05 | End: 2023-06-05 | Stop reason: SURG

## 2023-06-05 RX ORDER — KETAMINE HYDROCHLORIDE 50 MG/ML
INJECTION, SOLUTION, CONCENTRATE INTRAMUSCULAR; INTRAVENOUS AS NEEDED
Status: DISCONTINUED | OUTPATIENT
Start: 2023-06-05 | End: 2023-06-05 | Stop reason: SURG

## 2023-06-05 RX ORDER — NICOTINE POLACRILEX 4 MG
30 LOZENGE BUCCAL
Status: DISCONTINUED | OUTPATIENT
Start: 2023-06-05 | End: 2023-06-05 | Stop reason: HOSPADM

## 2023-06-05 RX ORDER — DEXTROSE MONOHYDRATE 25 G/50ML
50 INJECTION, SOLUTION INTRAVENOUS
Status: DISCONTINUED | OUTPATIENT
Start: 2023-06-05 | End: 2023-06-05 | Stop reason: HOSPADM

## 2023-06-05 RX ORDER — ROSUVASTATIN CALCIUM 20 MG/1
20 TABLET, COATED ORAL NIGHTLY
Status: DISCONTINUED | OUTPATIENT
Start: 2023-06-05 | End: 2023-06-08

## 2023-06-05 RX ORDER — ACETAMINOPHEN 325 MG/1
TABLET ORAL
Status: COMPLETED
Start: 2023-06-05 | End: 2023-06-05

## 2023-06-05 RX ORDER — CEFAZOLIN SODIUM/WATER 2 G/20 ML
2 SYRINGE (ML) INTRAVENOUS ONCE
Status: COMPLETED | OUTPATIENT
Start: 2023-06-05 | End: 2023-06-05

## 2023-06-05 RX ORDER — VANCOMYCIN 1.75 GRAM/500 ML IN 0.9 % SODIUM CHLORIDE INTRAVENOUS
15 ONCE
Status: COMPLETED | OUTPATIENT
Start: 2023-06-05 | End: 2023-06-05

## 2023-06-05 RX ORDER — SUCRALFATE 1 G/1
1 TABLET ORAL DAILY
Status: DISCONTINUED | OUTPATIENT
Start: 2023-06-06 | End: 2023-06-05

## 2023-06-05 RX ORDER — DIPHENHYDRAMINE HYDROCHLORIDE 50 MG/ML
12.5 INJECTION INTRAMUSCULAR; INTRAVENOUS EVERY 4 HOURS PRN
Status: DISCONTINUED | OUTPATIENT
Start: 2023-06-05 | End: 2023-06-08

## 2023-06-05 RX ORDER — HYDROMORPHONE HYDROCHLORIDE 1 MG/ML
0.2 INJECTION, SOLUTION INTRAMUSCULAR; INTRAVENOUS; SUBCUTANEOUS EVERY 2 HOUR PRN
Status: DISCONTINUED | OUTPATIENT
Start: 2023-06-05 | End: 2023-06-08

## 2023-06-05 RX ORDER — METOCLOPRAMIDE HYDROCHLORIDE 5 MG/ML
INJECTION INTRAMUSCULAR; INTRAVENOUS AS NEEDED
Status: DISCONTINUED | OUTPATIENT
Start: 2023-06-05 | End: 2023-06-05 | Stop reason: SURG

## 2023-06-05 RX ORDER — VANCOMYCIN HYDROCHLORIDE 1 G/20ML
INJECTION, POWDER, LYOPHILIZED, FOR SOLUTION INTRAVENOUS
Status: DISCONTINUED
Start: 2023-06-05 | End: 2023-06-05 | Stop reason: WASHOUT

## 2023-06-05 RX ORDER — FAMOTIDINE 20 MG/1
20 TABLET, FILM COATED ORAL 2 TIMES DAILY
Status: DISCONTINUED | OUTPATIENT
Start: 2023-06-05 | End: 2023-06-05

## 2023-06-05 RX ORDER — METOCLOPRAMIDE HYDROCHLORIDE 5 MG/ML
10 INJECTION INTRAMUSCULAR; INTRAVENOUS EVERY 8 HOURS PRN
Status: DISCONTINUED | OUTPATIENT
Start: 2023-06-05 | End: 2023-06-05 | Stop reason: HOSPADM

## 2023-06-05 RX ORDER — BISACODYL 10 MG
10 SUPPOSITORY, RECTAL RECTAL
Status: DISCONTINUED | OUTPATIENT
Start: 2023-06-05 | End: 2023-06-08

## 2023-06-05 RX ORDER — FAMOTIDINE 10 MG/ML
20 INJECTION, SOLUTION INTRAVENOUS 2 TIMES DAILY
Status: DISCONTINUED | OUTPATIENT
Start: 2023-06-05 | End: 2023-06-05

## 2023-06-05 RX ORDER — DEXTROSE MONOHYDRATE 25 G/50ML
50 INJECTION, SOLUTION INTRAVENOUS
Status: DISCONTINUED | OUTPATIENT
Start: 2023-06-05 | End: 2023-06-08

## 2023-06-05 RX ORDER — MIDAZOLAM HYDROCHLORIDE 1 MG/ML
1 INJECTION INTRAMUSCULAR; INTRAVENOUS EVERY 5 MIN PRN
Status: DISCONTINUED | OUTPATIENT
Start: 2023-06-05 | End: 2023-06-05 | Stop reason: HOSPADM

## 2023-06-05 RX ORDER — NICOTINE POLACRILEX 4 MG
15 LOZENGE BUCCAL
Status: DISCONTINUED | OUTPATIENT
Start: 2023-06-05 | End: 2023-06-05 | Stop reason: HOSPADM

## 2023-06-05 RX ORDER — ENEMA 19; 7 G/133ML; G/133ML
1 ENEMA RECTAL ONCE AS NEEDED
Status: DISCONTINUED | OUTPATIENT
Start: 2023-06-05 | End: 2023-06-08

## 2023-06-05 RX ORDER — HYDROMORPHONE HYDROCHLORIDE 1 MG/ML
0.2 INJECTION, SOLUTION INTRAMUSCULAR; INTRAVENOUS; SUBCUTANEOUS EVERY 5 MIN PRN
Status: DISCONTINUED | OUTPATIENT
Start: 2023-06-05 | End: 2023-06-05 | Stop reason: HOSPADM

## 2023-06-05 RX ORDER — TRAMADOL HYDROCHLORIDE 50 MG/1
50 TABLET ORAL EVERY 6 HOURS SCHEDULED
Status: DISCONTINUED | OUTPATIENT
Start: 2023-06-05 | End: 2023-06-08

## 2023-06-05 RX ORDER — ONDANSETRON 2 MG/ML
4 INJECTION INTRAMUSCULAR; INTRAVENOUS EVERY 6 HOURS PRN
Status: DISCONTINUED | OUTPATIENT
Start: 2023-06-05 | End: 2023-06-05 | Stop reason: HOSPADM

## 2023-06-05 RX ORDER — SODIUM CHLORIDE, SODIUM LACTATE, POTASSIUM CHLORIDE, CALCIUM CHLORIDE 600; 310; 30; 20 MG/100ML; MG/100ML; MG/100ML; MG/100ML
INJECTION, SOLUTION INTRAVENOUS CONTINUOUS
Status: DISCONTINUED | OUTPATIENT
Start: 2023-06-05 | End: 2023-06-08

## 2023-06-05 RX ORDER — DEXAMETHASONE SODIUM PHOSPHATE 10 MG/ML
8 INJECTION, SOLUTION INTRAMUSCULAR; INTRAVENOUS ONCE
Status: COMPLETED | OUTPATIENT
Start: 2023-06-06 | End: 2023-06-06

## 2023-06-05 RX ORDER — LEVOTHYROXINE SODIUM 0.07 MG/1
75 TABLET ORAL
Status: DISCONTINUED | OUTPATIENT
Start: 2023-06-06 | End: 2023-06-08

## 2023-06-05 RX ORDER — HYDROMORPHONE HYDROCHLORIDE 1 MG/ML
0.6 INJECTION, SOLUTION INTRAMUSCULAR; INTRAVENOUS; SUBCUTANEOUS EVERY 5 MIN PRN
Status: DISCONTINUED | OUTPATIENT
Start: 2023-06-05 | End: 2023-06-05 | Stop reason: HOSPADM

## 2023-06-05 RX ORDER — OXYCODONE HYDROCHLORIDE 5 MG/1
5 TABLET ORAL EVERY 4 HOURS PRN
Status: DISCONTINUED | OUTPATIENT
Start: 2023-06-05 | End: 2023-06-08

## 2023-06-05 RX ORDER — ONDANSETRON 2 MG/ML
4 INJECTION INTRAMUSCULAR; INTRAVENOUS EVERY 6 HOURS PRN
Status: DISCONTINUED | OUTPATIENT
Start: 2023-06-05 | End: 2023-06-08

## 2023-06-05 RX ORDER — NICOTINE POLACRILEX 4 MG
30 LOZENGE BUCCAL
Status: DISCONTINUED | OUTPATIENT
Start: 2023-06-05 | End: 2023-06-08

## 2023-06-05 RX ORDER — ACETAMINOPHEN 500 MG
1000 TABLET ORAL ONCE
Status: DISCONTINUED | OUTPATIENT
Start: 2023-06-05 | End: 2023-06-05 | Stop reason: HOSPADM

## 2023-06-05 RX ORDER — ACETAMINOPHEN 325 MG/1
650 TABLET ORAL ONCE
Status: COMPLETED | OUTPATIENT
Start: 2023-06-05 | End: 2023-06-05

## 2023-06-05 RX ADMIN — LIDOCAINE HYDROCHLORIDE 30 MG: 10 INJECTION, SOLUTION EPIDURAL; INFILTRATION; INTRACAUDAL; PERINEURAL at 12:08:00

## 2023-06-05 RX ADMIN — SODIUM CHLORIDE, SODIUM LACTATE, POTASSIUM CHLORIDE, CALCIUM CHLORIDE: 600; 310; 30; 20 INJECTION, SOLUTION INTRAVENOUS at 12:03:00

## 2023-06-05 RX ADMIN — ONDANSETRON 4 MG: 2 INJECTION INTRAMUSCULAR; INTRAVENOUS at 14:13:00

## 2023-06-05 RX ADMIN — KETAMINE HYDROCHLORIDE 25 MG: 50 INJECTION, SOLUTION, CONCENTRATE INTRAMUSCULAR; INTRAVENOUS at 12:28:00

## 2023-06-05 RX ADMIN — ROPIVACAINE HYDROCHLORIDE 15 ML: 5 INJECTION, SOLUTION EPIDURAL; INFILTRATION; PERINEURAL at 12:32:00

## 2023-06-05 RX ADMIN — CEFAZOLIN SODIUM/WATER 2 G: 2 G/20 ML SYRINGE (ML) INTRAVENOUS at 12:20:00

## 2023-06-05 RX ADMIN — METOCLOPRAMIDE HYDROCHLORIDE 10 MG: 5 INJECTION INTRAMUSCULAR; INTRAVENOUS at 12:32:00

## 2023-06-05 RX ADMIN — MIDAZOLAM HYDROCHLORIDE 2 MG: 1 INJECTION INTRAMUSCULAR; INTRAVENOUS at 12:03:00

## 2023-06-05 RX ADMIN — TRANEXAMIC ACID 1000 MG: 10 INJECTION, SOLUTION INTRAVENOUS at 12:25:00

## 2023-06-05 RX ADMIN — SODIUM CHLORIDE, SODIUM LACTATE, POTASSIUM CHLORIDE, CALCIUM CHLORIDE: 600; 310; 30; 20 INJECTION, SOLUTION INTRAVENOUS at 15:34:00

## 2023-06-05 RX ADMIN — ROCURONIUM BROMIDE 50 MG: 10 INJECTION, SOLUTION INTRAVENOUS at 12:08:00

## 2023-06-05 NOTE — ANESTHESIA POSTPROCEDURE EVALUATION
Lanette 49 Patient Status:  Inpatient   Age/Gender 79year old female MRN [id number]   Location [address] [facility] Attending Urbano Cardoza MD   [facility] Day # 0 PCP Shruthi Perry MD       Anesthesia Post-op Note    REVISION RIGHT TOTAL KNEE REPLACEMENT    Procedure Summary     Date: 06/05/23 Room / Location: [facility] MAIN OR 05 / [facility] MAIN OR    Anesthesia Start: 9311 Anesthesia Stop: 5081    Procedure: REVISION RIGHT TOTAL KNEE REPLACEMENT (Right: Knee) Diagnosis: (RIGHT KNEE MECHANICAL LOOSENING PROSTHETIC)    Surgeons: Urbano Cardoza MD Anesthesiologist: Sadie Serrano MD    Anesthesia Type: general ASA Status: 3          Anesthesia Type: general    Vitals Value Taken Time   /72 06/05/23 1534   Temp 98.5 06/05/23 1534   Pulse 96 06/05/23 1534   Resp 165 06/05/23 1534   SpO2 92 06/05/23 1534   Vitals shown include unvalidated device data.     Patient Location: PACU    Anesthesia Type: general    Airway Patency: extubated    Postop Pain Control: adequate    Mental Status: mildly sedated but able to meaningfully participate in the post-anesthesia evaluation    Nausea/Vomiting: none    Cardiopulmonary/Hydration status: stable euvolemic    Complications: no apparent anesthesia related complications    Postop vital signs: stable    Dental Exam: Unchanged from Preop

## 2023-06-05 NOTE — OPERATIVE REPORT
[facility]  Report of Surgery    Hilario Casey Patient Status:  Inpatient    1955 MRN [id number]   [facility] SURGERY Attending Silvia Posada MD   Hosp Day # 0 PCP Mason Mcneill MD     Diagnosis: Mechanically loose right total knee replacement  Postoperative diagnosis: Same  Procedure: Revision right total knee replacement  Surgeon: An Troy MD  First assistant: Yuli Carmen PA-C  Anesthesia: General anesthesia with abductor canal block  Tourniquet time 096 minutes  Complications: None  Implant [employer] revision knee system. Size 5 revision femur, 8 mm posterior lateral augment, 110 x 16 cementless stem. Size 4 revision tibia 37 full Porocoat proximal sleeve, 60 x 16 press-fit stem. 6 mm constrained RP liner. Patient was brought to the OR. Was laid in supine position. Anesthesia was done. Preoperative antibiotic with vancomycin and Ancef were both given. TXA was given. Arms were positioned by anesthesia. Left leg had SCDs applied. Right leg was prepped and draped in sterile fashion. Timeout was performed. Bloodless exam noted. Tourniquet was inflated. Anterior midline incision was made using the old incision. Medial arthrotomy was made. Much time was spent freeing up the patella taking down the retropatellar scar, suprapatellar scar, medial lateral gutters. Then the knee was flexed. Knee was then dislocated. Poly was taken out. Femoral component appeared to be stable. Using for an osteotome, I undermined the implant/cement interface as best as I could. Medial lateral sides were loosened. It was impacted off. Condyles had relatively good bone but the notch lost some bone. Then attention was turned to exposing the tibia. Careful attention was paid to applying the retractors posteriorly and laterally. Tibial component was loose and was easily disimpacted off. Residual cement mantle was then taken out piecemeal using an osteotome.   Curette was used to curette out the proximal tibial surface. Attention was then turned to preparing the tibia. I put a long izzy down the tibia just to get a feel for the alignment of the tibial shaft in relation to the tibial metaphyseal surface. It was obviously in a varus angulation as anticipated from the preoperative x-ray. Then I reamed sequentially up to a size 16 mm. Using a 16 mm trial stem, I broached using 29 mm broach then up to 37. It gave solid fixation. I made a proximal tibial cut off this broach superior surface. I did not really take much down from the medial side. Laterally I took down few millimeters. Tibia was sized at 4. Ciarra Oro was taken out and then the trial sleeve with stem and size 4 was impacted. Femur was then sized. Appear to be about a size 5 femur. I sequentially reamed the distal femur up to 16. Using the reamer as a guide, I did a freshen up cup of the distal femur. Distal femoral condyles appear to be intact. At this point I put in a trial stem with the conventional AP cutting block size 5. This appeared to be a nice fit. Rotation was set in parallel fashion with the proximal tibia implant. Anterior cut was made. Posterior cuts were done. Medially there was not much of a defect. Laterally there was about an 8 mm posterior lateral bone defect. Chamfer cuts were made. Notch cuts were made in standard fashion to accommodate a constrained implant. At this point the trial implants were inserted. Size 6 8 poly fit nicely with relatively good balancing. Patella tracked nicely. All the trial implants were taken out. Knee was irrigated copiously. Patella component had some superficial wear but was stable and was decided to leave alone. In the back table I assembled the tibial component with the sleeve and the stem. I also some of the femoral component with the stem and the posterior lateral 8 mm augment. Then antibiotic cement was mixed. First tibial component was applied. Only the tibial surface and under the tibial implant was cemented. Tibial component was impacted. I did notice that it was sitting up about a millimeter or 2 proud of the trial.  I felt this was fine. It was a good metaphyseal fit. The femoral component was applied with cement in the metaphyseal and on the implant surface. This sat down nicely. At this point I applied a trial 6 poly. Knee came out to nice full extension. Flexion was stable. I allow the cement to harden at this stage. I decided to go with the V VC poly even though the knee felt relatively in good balance. After the cemented hardened, I dislocated the knee. Real VBC 6 mm poly was then inserted. Knee was reduced. It was taken through range of motion with good stability and good patella tracking. Medial durotomy was closed. Subcu layer was closed in multiple layers. Skin was closed using staples. A sterile dressing was applied with compression. Tourniquet was deflated at 120 minutes. All counts were correct.   Gary Trevizo MD  [facility]  6/5/2023

## 2023-06-06 ENCOUNTER — APPOINTMENT (OUTPATIENT)
Dept: GENERAL RADIOLOGY | Facility: HOSPITAL | Age: 68
End: 2023-06-06
Attending: INTERNAL MEDICINE
Payer: MEDICARE

## 2023-06-06 ENCOUNTER — APPOINTMENT (OUTPATIENT)
Dept: GENERAL RADIOLOGY | Facility: HOSPITAL | Age: 68
DRG: 467 | End: 2023-06-06
Attending: INTERNAL MEDICINE
Payer: MEDICARE

## 2023-06-06 LAB
ANION GAP SERPL CALC-SCNC: 3 MMOL/L (ref 0–18)
BUN BLD-MCNC: 21 MG/DL (ref 7–18)
CALCIUM BLD-MCNC: 8.2 MG/DL (ref 8.5–10.1)
CHLORIDE SERPL-SCNC: 110 MMOL/L (ref 98–112)
CO2 SERPL-SCNC: 26 MMOL/L (ref 21–32)
CREAT BLD-MCNC: 0.91 MG/DL
ERYTHROCYTE [DISTWIDTH] IN BLOOD BY AUTOMATED COUNT: 13.2 %
GFR SERPLBLD BASED ON 1.73 SQ M-ARVRAT: 69 ML/MIN/1.73M2 (ref 60–?)
GLUCOSE BLD-MCNC: 132 MG/DL (ref 70–99)
GLUCOSE BLD-MCNC: 150 MG/DL (ref 70–99)
GLUCOSE BLD-MCNC: 152 MG/DL (ref 70–99)
GLUCOSE BLD-MCNC: 156 MG/DL (ref 70–99)
GLUCOSE BLD-MCNC: 159 MG/DL (ref 70–99)
HCT VFR BLD AUTO: 33.1 %
HGB BLD-MCNC: 10.2 G/DL
MAGNESIUM SERPL-MCNC: 2 MG/DL (ref 1.6–2.6)
MCH RBC QN AUTO: 29.1 PG (ref 26–34)
MCHC RBC AUTO-ENTMCNC: 30.8 G/DL (ref 31–37)
MCV RBC AUTO: 94.3 FL
OSMOLALITY SERPL CALC.SUM OF ELEC: 294 MOSM/KG (ref 275–295)
PLATELET # BLD AUTO: 145 10(3)UL (ref 150–450)
POTASSIUM SERPL-SCNC: 4.1 MMOL/L (ref 3.5–5.1)
RBC # BLD AUTO: 3.51 X10(6)UL
SODIUM SERPL-SCNC: 139 MMOL/L (ref 136–145)
WBC # BLD AUTO: 7.4 X10(3) UL (ref 4–11)

## 2023-06-06 PROCEDURE — 71045 X-RAY EXAM CHEST 1 VIEW: CPT | Performed by: INTERNAL MEDICINE

## 2023-06-06 PROCEDURE — 5A09357 ASSISTANCE WITH RESPIRATORY VENTILATION, LESS THAN 24 CONSECUTIVE HOURS, CONTINUOUS POSITIVE AIRWAY PRESSURE: ICD-10-PCS | Performed by: ANESTHESIOLOGY

## 2023-06-06 RX ORDER — ACETAMINOPHEN 325 MG/1
650 TABLET ORAL 4 TIMES DAILY
Qty: 120 TABLET | Refills: 0 | Status: SHIPPED | OUTPATIENT
Start: 2023-06-06 | End: 2023-07-12 | Stop reason: ALTCHOICE

## 2023-06-06 RX ORDER — TRAMADOL HYDROCHLORIDE 50 MG/1
50 TABLET ORAL EVERY 6 HOURS SCHEDULED
Qty: 40 TABLET | Refills: 0 | Status: SHIPPED | OUTPATIENT
Start: 2023-06-07

## 2023-06-06 RX ORDER — OXYCODONE HYDROCHLORIDE 5 MG/1
5 TABLET ORAL EVERY 6 HOURS PRN
Qty: 20 TABLET | Refills: 0 | Status: SHIPPED | OUTPATIENT
Start: 2023-06-06 | End: 2023-06-08

## 2023-06-06 RX ORDER — NALOXONE HYDROCHLORIDE 4 MG/.1ML
4 SPRAY NASAL AS NEEDED
Qty: 1 KIT | Refills: 0 | Status: SHIPPED | OUTPATIENT
Start: 2023-06-06

## 2023-06-06 NOTE — CM/SW NOTE
Department  notified of request for issac [facility] referrals started. Assigned CM/SW to follow up with pt/family on further discharge planning.      Darrion Cyr  [facility]

## 2023-06-06 NOTE — PLAN OF CARE
Pt A&ox4, VSS on 1 L NC O2, , tele; NSR. IS strongly encouraged. POD 1 dressing intact; acewrap applied over dressing. Pt reports severe pain to incision site; PO pain medications given with relief. Pt tolerating diet, voiding freely. Plan to see PT later today. Plan of care reviewed with patient. Patient demonstrates understanding.

## 2023-06-06 NOTE — CM/SW NOTE
06/06/23 1100   CM/SW Referral Data   Referral Source Social Work (self-referral)   Reason for Referral Discharge planning   Informant Patient;EMR;Clinical Staff Member   Patient Info   Patient's Current Mental Status at Time of Assessment Alert;Oriented   Discharge Needs   Anticipated D/C needs Subacute rehab;Transportation services   Services Requested   PASRR Level 1 Submitted Yes     HOME SITUATION  Type of Home: House   Home Layout: Two level     Drives: Yes  Patient Owned Equipment: Rolling walker;Cane  Patient Regularly Uses: Glasses     Prior Level of [place] per PT eval: Pt reports she has no support at home. She is typically independent with ADL and mobility, however, has difficulty with lower body dressing. Pt ambulates with RW or cane, depending on the day. She is the primary CG for significant other with dementia. Pt has to ascend/descend stairs to basement to do laundry. Patient is a 80 y/o woman admitted s/p TKR revision. Pt with pre-operative plan for [facility]. Referral sent to [facility] via [facility] and confirmation received that pt can be accepted. Met with pt and provided [facility] information for [facility]. Pt stated that she worked with therapy today and [facility] is recommended. She would like to go to [facility]. Discussed need for insurance auth. Spoke with PT/OT and confirmed recommendation for [facility]. Message sent to [facility] to request [facility] referrals for [facility] and that she request [facility] for [facility] as well. PASRR completed. / to remain available for support and/or discharge planning.      Betty Townsend, [facility]  Discharge Planner  [phone]

## 2023-06-07 LAB
ANION GAP SERPL CALC-SCNC: 3 MMOL/L (ref 0–18)
BUN BLD-MCNC: 21 MG/DL (ref 7–18)
CALCIUM BLD-MCNC: 9 MG/DL (ref 8.5–10.1)
CHLORIDE SERPL-SCNC: 107 MMOL/L (ref 98–112)
CO2 SERPL-SCNC: 27 MMOL/L (ref 21–32)
CREAT BLD-MCNC: 0.87 MG/DL
ERYTHROCYTE [DISTWIDTH] IN BLOOD BY AUTOMATED COUNT: 13.3 %
GFR SERPLBLD BASED ON 1.73 SQ M-ARVRAT: 73 ML/MIN/1.73M2 (ref 60–?)
GLUCOSE BLD-MCNC: 122 MG/DL (ref 70–99)
GLUCOSE BLD-MCNC: 127 MG/DL (ref 70–99)
GLUCOSE BLD-MCNC: 136 MG/DL (ref 70–99)
GLUCOSE BLD-MCNC: 140 MG/DL (ref 70–99)
GLUCOSE BLD-MCNC: 145 MG/DL (ref 70–99)
HCT VFR BLD AUTO: 33.4 %
HGB BLD-MCNC: 10.5 G/DL
MAGNESIUM SERPL-MCNC: 2.3 MG/DL (ref 1.6–2.6)
MCH RBC QN AUTO: 29.4 PG (ref 26–34)
MCHC RBC AUTO-ENTMCNC: 31.4 G/DL (ref 31–37)
MCV RBC AUTO: 93.6 FL
OSMOLALITY SERPL CALC.SUM OF ELEC: 289 MOSM/KG (ref 275–295)
PLATELET # BLD AUTO: 144 10(3)UL (ref 150–450)
POTASSIUM SERPL-SCNC: 3.7 MMOL/L (ref 3.5–5.1)
RBC # BLD AUTO: 3.57 X10(6)UL
SODIUM SERPL-SCNC: 137 MMOL/L (ref 136–145)
WBC # BLD AUTO: 8.8 X10(3) UL (ref 4–11)

## 2023-06-07 PROCEDURE — L1930 AFO PLASTIC: HCPCS

## 2023-06-07 PROCEDURE — 99223 1ST HOSP IP/OBS HIGH 75: CPT | Performed by: OTHER

## 2023-06-07 RX ORDER — POTASSIUM CHLORIDE 20 MEQ/1
40 TABLET, EXTENDED RELEASE ORAL ONCE
Status: COMPLETED | OUTPATIENT
Start: 2023-06-07 | End: 2023-06-07

## 2023-06-07 NOTE — PLAN OF CARE
Patient A & O x4. VSS, on 3L NC. PAULA with BIPAP at night. Dressing to knee is clean, dry and intact. Gel ice wrap in place. Voiding via purewick. Awaiting AFO brace from . WBAT. Safety measures in place. Instructed to use call light.

## 2023-06-07 NOTE — PLAN OF CARE
A&Ox4. VSS. On room air. . IS encouraged. Telemetry monitoring - NSR. SCDs on BLE. Ankle pumps encouraged. Tolerating regular diet. Last BM 6/5. Voiding freely. Pain controlled. Dressing to right knee, C/D/I. Standing with minimal assist. Plan is to dc to [facility] when medically clear. Patient updated on plan of care. Safety precautions in place. Call light within reach.

## 2023-06-07 NOTE — CM/SW NOTE
06/07/23 1004   Choice of Post-Acute Provider   Informed patient of right to choose their preferred provider Yes   List of appropriate post-acute services provided to patient/family with quality data Yes   Patient/family choice [facility]   Information given to Patient     Met with pt and list of accepting [facility] facilities given. Pt confirmed preference for [facility]. Discussed pending insurance auth. Await auth and medical clearance for DC. / to remain available for support and/or discharge planning. [place] [facility] [address]  Fax: [phone] Asa Hernandez LCSW  Discharge Planner  [phone]    Addendum:  Received update from [facility] that they have confirmed insurance approval for [facility]. Await medical clearance for DC.

## 2023-06-07 NOTE — PROGRESS NOTES
Dressing to RLE removed per MD orders. Incision clean and dry. No erythema or excessive drainage noted. New Aquacel dressing applied. Voicemail left for [facility] for ankle mold. Facesheets and order form faxed.  Copies on chart

## 2023-06-08 VITALS
TEMPERATURE: 99 F | BODY MASS INDEX: 44.3 KG/M2 | OXYGEN SATURATION: 92 % | DIASTOLIC BLOOD PRESSURE: 62 MMHG | WEIGHT: 259.5 LBS | RESPIRATION RATE: 20 BRPM | HEART RATE: 87 BPM | HEIGHT: 64 IN | SYSTOLIC BLOOD PRESSURE: 144 MMHG

## 2023-06-08 LAB
ANION GAP SERPL CALC-SCNC: 2 MMOL/L (ref 0–18)
BUN BLD-MCNC: 16 MG/DL (ref 7–18)
CALCIUM BLD-MCNC: 8.8 MG/DL (ref 8.5–10.1)
CHLORIDE SERPL-SCNC: 111 MMOL/L (ref 98–112)
CO2 SERPL-SCNC: 26 MMOL/L (ref 21–32)
CREAT BLD-MCNC: 0.75 MG/DL
ERYTHROCYTE [DISTWIDTH] IN BLOOD BY AUTOMATED COUNT: 13.3 %
GFR SERPLBLD BASED ON 1.73 SQ M-ARVRAT: 87 ML/MIN/1.73M2 (ref 60–?)
GLUCOSE BLD-MCNC: 130 MG/DL (ref 70–99)
GLUCOSE BLD-MCNC: 131 MG/DL (ref 70–99)
HCT VFR BLD AUTO: 28.6 %
HGB BLD-MCNC: 9.3 G/DL
MAGNESIUM SERPL-MCNC: 2.2 MG/DL (ref 1.6–2.6)
MCH RBC QN AUTO: 29.7 PG (ref 26–34)
MCHC RBC AUTO-ENTMCNC: 32.5 G/DL (ref 31–37)
MCV RBC AUTO: 91.4 FL
OSMOLALITY SERPL CALC.SUM OF ELEC: 291 MOSM/KG (ref 275–295)
PLATELET # BLD AUTO: 133 10(3)UL (ref 150–450)
POTASSIUM SERPL-SCNC: 3.8 MMOL/L (ref 3.5–5.1)
POTASSIUM SERPL-SCNC: 3.8 MMOL/L (ref 3.5–5.1)
RBC # BLD AUTO: 3.13 X10(6)UL
SODIUM SERPL-SCNC: 139 MMOL/L (ref 136–145)
WBC # BLD AUTO: 6.5 X10(3) UL (ref 4–11)

## 2023-06-08 RX ORDER — SPIRONOLACTONE 25 MG/1
25 TABLET ORAL DAILY
Status: DISCONTINUED | OUTPATIENT
Start: 2023-06-08 | End: 2023-06-08

## 2023-06-08 RX ORDER — POTASSIUM CHLORIDE 20 MEQ/1
40 TABLET, EXTENDED RELEASE ORAL ONCE
Status: COMPLETED | OUTPATIENT
Start: 2023-06-08 | End: 2023-06-08

## 2023-06-08 RX ORDER — HYDROCODONE BITARTRATE AND ACETAMINOPHEN 5; 325 MG/1; MG/1
1 TABLET ORAL EVERY 6 HOURS PRN
Qty: 15 TABLET | Refills: 0 | Status: SHIPPED | OUTPATIENT
Start: 2023-06-08

## 2023-06-08 RX ORDER — HYDROCODONE BITARTRATE AND ACETAMINOPHEN 5; 325 MG/1; MG/1
1 TABLET ORAL EVERY 6 HOURS PRN
Status: DISCONTINUED | OUTPATIENT
Start: 2023-06-08 | End: 2023-06-08

## 2023-06-08 RX ORDER — TORSEMIDE 20 MG/1
20 TABLET ORAL DAILY
Status: DISCONTINUED | OUTPATIENT
Start: 2023-06-08 | End: 2023-06-08

## 2023-06-08 RX ORDER — HYDROCODONE BITARTRATE AND ACETAMINOPHEN 5; 325 MG/1; MG/1
1 TABLET ORAL EVERY 6 HOURS PRN
Qty: 60 TABLET | Refills: 0 | Status: SHIPPED | OUTPATIENT
Start: 2023-06-08 | End: 2023-06-08

## 2023-06-08 RX ORDER — ACETAMINOPHEN 500 MG
500 TABLET ORAL 4 TIMES DAILY
Status: DISCONTINUED | OUTPATIENT
Start: 2023-06-08 | End: 2023-06-08

## 2023-06-08 NOTE — PROGRESS NOTES
Patient transported to [facility] via [employer]. IV removed. Bedside belongings packed up and returned to patient.  Scripts sent with patient

## 2023-06-08 NOTE — CM/SW NOTE
Spoke with pt's RN who stated pt can discharge to [facility] today. Confirmed [facility] able to accept. Medicar transport scheduled for 2pm today. PCS form completed and available for RN to print. Met with pt to discuss DC plan and costs for medicar transport. Pt agreeable with plan and costs. No further needs at this time. Updated RN. / to remain available for support and/or discharge planning.      Bayron Sizer of [address]  Fax: [phone]    [facility]  [phone]    Efrain Marcano LCSW  Discharge Planner  [phone]

## 2023-06-08 NOTE — PLAN OF CARE
Pt AOx4. VSS on RA. Tele. SCD on LLE. Foot drop present to right foot. R knee precautions in place. Voiding via purewick. C/o moderate pain to right knee, see MAR for pain meds. Saline locked. Aquacel dressing c/d/i. Gel ice. Cardiac electrolyte protocol initiated. Regular diet, tolerating well. Plan of care updated with pt. Will continue to monitor.

## 2023-06-08 NOTE — PLAN OF CARE
Alert and oriented x 4. Vitals stable on room air. Pain managed on PO medications. Right foot drop. Dressings dry, intact, scant old drainage present. Voiding without difficulty via Purewick. Last BM 06/06 Tolerating regular diet. SCD's on bilaterally. Safety precautions in place. Plan of care discussed with patient.

## 2023-06-08 NOTE — PROGRESS NOTES
AVS reviewed, IV dc'd per Roxanne Caballero RN, Pain meds clarified w/ Dorothy Samuels PA-c fr [facility]- Oxycodone dc'd, Norco script signed by Dr. Petit Letters, will dc now to [facility] via ambulance, Scripts in envelope.

## 2023-06-20 ENCOUNTER — TELEPHONE (OUTPATIENT)
Dept: NEUROLOGY | Facility: CLINIC | Age: 68
End: 2023-06-20

## 2023-06-23 NOTE — TELEPHONE ENCOUNTER
Patient states she is currently in nursing rehab d/t surgery on 6/5/23. She has not taken her nortriptyline since April because the pharmacy told her the medication will interact with her anxiety medication. Patient has upcoming appointment to discuss foot drop and medication.

## 2023-06-27 NOTE — LETTER
[facility] [address], [address] [place] [address]    Consent for Anesthesia   1.    I, Kristi Jaime agree to be cared for by an anesthesiologist, who is specially trained to monitor me and give me medicine to put me to sleep or keep me comfortabl Video Swallow Study      Patient Name: Cat Espinosa  Today's Date: 6/27/2023        Past Medical History  Past Medical History:   Diagnosis Date   • Acute blood loss anemia 9/9/2020   • Aftercare following joint replacement 9/9/2020    Patient had right reversed total shoulder arthroplasty.  Pain was controlled when he left the hospital.     • Anxiety    • Arthritis    • Cellulitis of left lower extremity 11/30/2019   • Depression    • Disease of thyroid gland     HYPO   • Dyspepsia 11/12/2019   • Dysphagia 2/6/2023   • Femur neck fracture (HCC)    • GERD (gastroesophageal reflux disease)    • Hyperthyroidism     RESOLVED: [id number]   • Hyponatremia 7/25/2022   • Leg pain 2/6/2023   • Osteoporosis    • Polio    • PVD (peripheral vascular disease) ([address])    • Right BBB/left ant fasc block    • Shoulder pain, bilateral 7/27/2021   • UTI (urinary tract infection)    • Varicella infection     LAST ASSESSED: [id number]        Past Surgical History  Past Surgical History:   Procedure Laterality Date   • APPENDECTOMY     • HIP ARTHROPLASTY Left 11/27/2017    Procedure: REMOVAL IM NAIL CONVERSION TO TOTAL HIP ARTHROPLASTY POSTERIOR APPROACH;  Surgeon: Skyla Perdomo MD;  [facility];  Service: Orthopedics   • HIP FRACTURE SURGERY     • HIP SURGERY      both hips replaced;2013 left ,2014 right   • JOINT REPLACEMENT Right     HIP TOTAL   • DE ARTHROPLASTY GLENOHUMERAL JOINT TOTAL SHOULDER Right 9/2/2020    Procedure: ARTHROPLASTY SHOULDER REVERSE;  Surgeon: Adrian Arias MD;  [facility];  Service: Orthopedics   • DE ARTHROPLASTY GLENOHUMERAL JOINT TOTAL SHOULDER Left 6/1/2021    Procedure: ARTHROPLASTY SHOULDER REVERSE;  Surgeon: Adrian Arias MD;  [facility];  Service: Orthopedics   • DE CONV PREV HIP TOT HIP ARTHRP W/WO AGRFT/ALGRFT Left 10/4/2017    Procedure: Hareware removal of left hip;  Surgeon: Skyla Perdomo MD;  [facility];  Service: Orthopedics   • [place] [id number] WRST SURG W/RLS TRANSVRS CARPL LIGM Right 5/24/2022    Procedure: RELEASE CARPAL TUNNEL ENDOSCOPIC-right;  Surgeon: Sabino Benito MD;  [facility];  Service: Orthopedics   • REVISION TOTAL HIP ARTHROPLASTY Right    • TOE AMPUTATION Left 7/24/2022    Procedure: 5TH METATARSAL RESECTION WITH BOTTOM FLAP CLOSURE;  Surgeon: Jake Wayne DPM;  [facility];  Service: Podiatry   • TONSILLECTOMY       Speech Pathology - Modified Barium Swallow Study    Patient Name: Cat Espinosa    Today's Date: 6/27/2023     Problem List  Principal Problem:    Generalized weakness  Active Problems:    Rheumatoid arthritis involving multiple sites with positive rheumatoid factor (HCC)    Chronic pain syndrome    Hyponatremia    Dysphagia    Compression fracture of L5 vertebra with routine healing    Protein calorie malnutrition (HCC)    Possible pyelonephritis    Nausea and vomiting    Hypokalemia      Past Medical History  Past Medical History:   Diagnosis Date   • Acute blood loss anemia 9/9/2020   • Aftercare following joint replacement 9/9/2020    Patient had right reversed total shoulder arthroplasty.  Pain was controlled when he left the hospital.     • Anxiety    • Arthritis    • Cellulitis of left lower extremity 11/30/2019   • Depression    • Disease of thyroid gland     HYPO   • Dyspepsia 11/12/2019   • Dysphagia 2/6/2023   • Femur neck fracture ([facility])    • GERD (gastroesophageal reflux disease)    • Hyperthyroidism     RESOLVED: [id number]   • Hyponatremia 7/25/2022   • Leg pain 2/6/2023   • Osteoporosis    • Polio    • PVD (peripheral vascular disease) ([facility])    • Right BBB/left ant fasc block    • Shoulder pain, bilateral 7/27/2021   • UTI (urinary tract infection)    • Varicella infection     LAST ASSESSED: [id number]       Past Surgical History  Past Surgical History:   Procedure Laterality Date   • APPENDECTOMY     • HIP ARTHROPLASTY Left 11/27/2017    Procedure: REMOVAL IM NAIL vision, nerves, or muscles and in extremely rare instances death. 5. My doctor has explained to me other choices available to me for my care (alternatives).   6. Pregnant Patients (“epidural”):  I understand that the risks of having an epidural (medicine g CONVERSION TO TOTAL HIP ARTHROPLASTY POSTERIOR APPROACH;  Surgeon: Bairon Hernandez MD;  [facility];  Service: Orthopedics   • HIP FRACTURE SURGERY     • HIP SURGERY      both hips replaced;2013 left ,2014 right   • JOINT REPLACEMENT Right     HIP TOTAL   • DE ARTHROPLASTY GLENOHUMERAL JOINT TOTAL SHOULDER Right 9/2/2020    Procedure: ARTHROPLASTY SHOULDER REVERSE;  Surgeon: Modesto Costello MD;  [facility];  Service: Orthopedics   • DE ARTHROPLASTY GLENOHUMERAL JOINT TOTAL SHOULDER Left 6/1/2021    Procedure: ARTHROPLASTY SHOULDER REVERSE;  Surgeon: Modesto Costello MD;  [facility];  Service: Orthopedics   • DE CONV PREV HIP TOT HIP ARTHRP W/WO AGRFT/ALGRFT Left 10/4/2017    Procedure: Naomia Spurr removal of left hip;  Surgeon: Bairon Hernandez MD;  [facility];  Service: Orthopedics   • [place] [id number] WRST SURG W/RLS TRANSVRS CARPL LIGM Right 5/24/2022    Procedure: RELEASE CARPAL TUNNEL ENDOSCOPIC-right;  Surgeon: Alen Carrillo MD;  [facility];  Service: Orthopedics   • REVISION TOTAL HIP ARTHROPLASTY Right    • TOE AMPUTATION Left 7/24/2022    Procedure: 5TH METATARSAL RESECTION WITH BOTTOM FLAP CLOSURE;  Surgeon: Catherine Huang DPM;  [facility];  Service: Podiatry   • TONSILLECTOMY         Assessment Summary:    Pt presents with severe oropharyngeal dysphagia characterized by reduced lingual propulsion, swallow delay, reduced hyolaryngeal movement and uncoordinated stripping wave with retrograde flow throughout redirecting the bolus at times. The PES does note relax well to clear the material and a known CP bar remains present. The bar has appeared to increase with max retention directly above it with ongoing and worsening retropulsion to the PS and the airway. Pharyngeal contraction is also poor. Poor airway protection w/ all material and noted aspiration either during the swallow or after on attempts to 2* swallow and clear.   The mult swallows only add material to the airway. Note: Images are available for review in PACS as desired. Recommendations:   Recommended Diet: NPO, NPO with tube feeds, Continue frequent oral care and consider GOC as well as discussion about alternate means of nurition   Recommended Form of Medications: non-oral if possible   Aspiration precautions and compensatory swallowing strategies: -  Consider referral to:  RD, GI, ?palliative-need to address the CP bar  SLP Dysphagia therapy recommended: -    Results Reviewed with: patient, RN and MD   Pt/Family Education: initiated. Pt and caregivers would benefit from/require continued education. Patient Stated Goal:-    Dysphagia Goals per SLP: none at this time      General Information; Refer to initial bedside for information      Prior MBS:2/7/23    Modified (Video) Barium Swallow Study     Summary:  Pt presents with mild oral and moderate pharyngeal dysphagia. Dysphagia is characterized by reduced bolus transfer/lingual motion, slightly delayed pharyngeal swallow, reduced anterior hyoid excursion, reduced pharyngeal contraction and pharyngeal residual with nectar, honey, puree and solids. Pt has a prominent CP bar resulting in transient pocketing/retention above the UES with retropulsion to the pyriforms, most significant with solids. Discussed with pt about consulting with ENT about her CP bar but pt does not want any more surgeries and would prefer to manage with diet modification. No aspiration occurred today. Images are on PACS for review.         Recommendations:  Diet: dysphagia level 1 puree  Liquids: thin liquids  Meds: crushed with applesauce  Oral Mechanism Exam  Facial: symmetrical  Labial: WFL  Lingual: WFL  Velum: unable to visualize  Mandible:  decreased ROM  Dentition: adequate  Vocal quality: hoarse   Volitional Cough: unable to initiate volitional cough    Pt was viewed sitting upright in the lateral and AP positions.  Due to concerns for patient safety / patient refusal, trials provided deviated from the MBSImP Validated Protocol. Pt was given 5-mL thin liquid x2, 20-mL cup sip thin, 5-mL nectar thick, 20-mL cup sip nectar thick, 5-mL honey thick, 5-mL pudding, ½ cookie coated with 3-mL pudding, 5-mL nectar thick in the AP position and 5-mL pudding in the AP position. Straw sips thin also offered      Initial view observations/comments: Clear view of the upper airway      8-Point Penetration-Aspiration Scale   Thin liquid 8 - Material enters the airway, passes below the vocal folds, and no effort is made to eject     Nectar thick liquid 8 - Material enters the airway, passes below the vocal folds, and no effort is made to eject     Honey thick liquid 8 - Material enters the airway, passes below the vocal folds, and no effort is made to eject     Puree (pudding) 8 - Material enters the airway, passes below the vocal folds, and no effort is made to eject     Solid 8 - Material enters the airway, passes below the vocal folds, and no effort is made to eject       Strategies and Efficacy: mult swallows not effective, retrograde flow with min clearing. Aspiration Response and Efficacy:  No immed cough. Delayed cough w/the largest amount but cannot clear. MBS IMP Rating    ORAL Impairment  Compinent 1--Lip Closure  Judged at any point during the swallow. 1 - Interlabial escape; no progression to anterior lip    Component 2--Tongue Control During Bolus Hold  Judged on held liquid boluses only and prior to productive tongue movement. 0 - Cohesive bolus between tongue to palatal seal    Component 3--Bolus Preparation/Mastication  Judged only during presentation of 1/2 shortbread cookie coated in pudding. 2 - Disorganized chewing/mashing with solid pieces of bolus unchewed    Component 4--Bolus Transport/Lingual Motion  Judged after first productive tongue movement for oral bolus transport.   3 - Repetitive/disorganized tongue motion    Component 5--Oral Residue  Judged after first swallow or after the last swallow of the sequential swallow task. 3 - Majority of bolus remaining   Location   A - Floor of Mouth, B - Palate and C - Tongue    Component 6--Initiation of Pharyngeal Swallow  Judged at first movement of the brisk superior-anterior hyoid trajectory. 3 - Bolus head in pyriforms      PHARYNGEAL Impairment  Component 7--Soft Palate Elevation  Judged during maximum displacement of soft palate. 3 - Escape to the nasal cavity    Component 8--Laryngeal Elevation  Judged when epiglottis is in its most horizontal position. 2 - Minimal superior movement of thyroid cartilage with minimal approximation of arytenoids to epiglottic petiole    Component 9--Anterior Hyoid Excursion  Judged at height of swallow/maximal anterior hyoid displacement. 1 - Partial anterior movement    Component 10--Epiglottic Movement  Judged at height of swallow/maximal anterior hyoid displacement. 2 - No inversion    Component 11--Laryngeal Vestibular Closure  Judged at height of swallow/maximal anterior hyoid displacement. 1 - Incomplete; narrow column air/contrast in laryngeal vestibule    Component 12--Pharyngeal Stripping Wave  Judged during the full duration of the pharyngeal swallow. 2 - Absent-retrograde flow    Component 13--Pharyngeal Contraction  Judged in AP view at rest and throughout maximum movement of structures. 3 - Bilateral Bulging    Component 14--Pharyngoesophageal Segment Opening  Judged during maximum distension of PES and throughout opening and closure. 2 - Minimal distension/minimal duration; marked obstruction of flow    Component 15--Tongue Base (TB) Retraction  Judged during maximum retraction of the tongue base. 3 - Wide column of contrast or air between TB and PW noted w/ attempts to clear the solids with thin    Component 16--Pharyngeal Residue  Judged after first swallow or after the last swallow of the sequential swallow task.   4 - Minimal to no pharyngeal clearance   Location   A - Tongue Base, B - Valleculae, C - Pharyngeal wall, D - Aryepiglottic folds, E - Pyriform sinuses and F - Diffuse (>3 areas)      ESOPHAGEAL Impairment  Component 17--Esophageal Clearance Upright Position  Judged in AP view during bolus transit through the oral cavity to the LES  2 - Esophageal retention with retrograde flow below pharyngoesophageal segment (PES)

## 2023-07-12 ENCOUNTER — OFFICE VISIT (OUTPATIENT)
Dept: NEUROLOGY | Facility: CLINIC | Age: 68
End: 2023-07-12
Payer: COMMERCIAL

## 2023-07-12 VITALS — RESPIRATION RATE: 16 BRPM | HEART RATE: 64 BPM | SYSTOLIC BLOOD PRESSURE: 126 MMHG | DIASTOLIC BLOOD PRESSURE: 74 MMHG

## 2023-07-12 DIAGNOSIS — G43.009 MIGRAINE WITHOUT AURA AND WITHOUT STATUS MIGRAINOSUS, NOT INTRACTABLE: ICD-10-CM

## 2023-07-12 DIAGNOSIS — G62.9 POLYNEUROPATHY: ICD-10-CM

## 2023-07-12 DIAGNOSIS — G57.01 COMMON PERONEAL NEUROPATHY OF RIGHT LOWER EXTREMITY: Primary | ICD-10-CM

## 2023-07-12 DIAGNOSIS — M54.17 LUMBOSACRAL RADICULOPATHY: ICD-10-CM

## 2023-07-12 DIAGNOSIS — I63.81 BASAL GANGLIA STROKE (HCC): ICD-10-CM

## 2023-07-12 PROCEDURE — 1159F MED LIST DOCD IN RCRD: CPT | Performed by: OTHER

## 2023-07-12 PROCEDURE — 3074F SYST BP LT 130 MM HG: CPT | Performed by: OTHER

## 2023-07-12 PROCEDURE — 3078F DIAST BP <80 MM HG: CPT | Performed by: OTHER

## 2023-07-12 PROCEDURE — 99214 OFFICE O/P EST MOD 30 MIN: CPT | Performed by: OTHER

## 2023-07-12 RX ORDER — NORTRIPTYLINE HYDROCHLORIDE 50 MG/1
50 CAPSULE ORAL NIGHTLY
Qty: 30 CAPSULE | Refills: 2 | Status: SHIPPED | OUTPATIENT
Start: 2023-07-12

## 2023-07-12 NOTE — PROGRESS NOTES
[address]   Neurology; follow up  3 [facility] Patient Status:  No patient class for patient encounter    1955 MRN [id number]   Location [facility] PCP Kelley Martinez MD     REASON FOR THE VISIT:  Patient presents with:  Neurologic Problem      HISTORY OF PRESENT ILLNESS:  Shayna Adame is a(n) 79year old, right handed,  female who presents for spasm in her hand, feet  for a week, she had back surgery in , since then has been using walker for balance, security,   Last week or two, She has frequent spasm in both hands, it comes daily, multiple times a day, sharp pain, lasting all day, she has neck pain, she also has pain in her feet, spasm in her hand and foot muscles, no recent medication change, no head injury, no neck injury, she has no HA or dizziness. No LOC, she was last seen on 2021 due to her pain in her feet. She was give test order of EMG for neuropathy work up, she did not do it, lost follow up due to family matters. She went to [facility], who ordered EMG before last visit, I did it. She has been having same pain in her feet, back pain a lot. She tried different nerve and pain medication, back PT, it did not help her. She was last seen on 2021 due to above concerns, she was given Neurontin, up the dose,  still not make any difference. She was referred to pain clinic, it did not help her pain. Then She had back surgery done last 2021, it helped her back pain,   Two months ago, she experienced episode of  dizziness, she saw primary last month, she did MRI brain it showed mini stroke, she is referred back to see me. She was told to take ASA daily, she feels Neurontin 1800 mg a day did not help her neuropathy, her dizziness resolved, she has  no weakness or focal numbness. Interim  Has had a headache behind her eyes for several days, throbbing, light and sound sensitive.  Has similar headaches about twice a year. Has taken steroids in the past. Has continued pain in feet, that moves up to her knees at night. Had L spine surgery in 12/2021. Has a spinal stimulator. Interim  Patient reports has less feeling in feet and Lyrica is not helping. Tingling moves up to the knees at night. Feels like her skin is too tight in the feet. Denies weakness. Has more back pain. Has L spine surgery in 2013, and then in 12/2021 for loose hardware, had L4-5 foraminal narrowing. Migraines are still occurring 2 times a week. Interim  Since 4/14/23 LOV, patient has new issue. Had surgery 6/5/23, and day after was not able to dorsiflex the right foot. This was a revision surgery. When tries to walk, foot will catch. Has burning pain on the front of her leg, and allodynia there. Has numbness in this area as well. Numbness at the bottom of both feet is unchanged. Other data review  2/2021 EMG - reviewed, no sural responses    Component      Latest Ref Rng 3/15/2023   HEMOGLOBIN A1c      <5.7 % 6.3 (H)    ESTIMATED AVERAGE GLUCOSE      68 - 126 mg/dL 134 (H)         Component      Latest Ref Rng & Units 8/23/2022   SPE INTERPRETATION       Monoclonal spike detected by immunofixation electrophoresis . . . IMMUNOFIXATION       Faint suggestion of monoclonal IgG lambda. If clinically indicated, suggest 24 hour urine monoclonal protein studies. . . .    KAPPA FREE LIGHT CHAIN      0.330 - 1.940 mg/dL 3.062 (H)   LAMBDA FREE LIGHT CHAIN      0.571 - 2.630 mg/dL 2.369   KAPPA/LAMBDA FLC RATIO      0.26 - 1.65 1.29     Component      Latest Ref Rng 3/9/2023   KAPPA FREE LIGHT CHAIN      0.330 - 1.940 mg/dL 2.687 (H)    LAMBDA FREE LIGHT CHAIN      0.571 - 2.630 mg/dL 2.030    KAPPA/LAMBDA FLC RATIO      0.26 - 1.65  1.32    IMMUNOGLOBULIN A      70.00 - 312.00 mg/dL 126.00    IMMUNOGLOBULIN G      791 - 1,643 mg/dL 752 (L)    IMMUNOGLOBULIN M      43.0 - 279.0 mg/dL 52.5           PAST MEDICAL HISTORY:  Past Medical History:   Diagnosis Date    Abdominal distention     Abdominal pain     Acute, but ill-defined, cerebrovascular disease     Anxiety     Arthritis     Back pain     Back problem     lower    Bloating     Blood in urine     Bowel obstruction (HCC)     Calculus of kidney     Cataract     DDD (degenerative disc disease), lumbar     Depression     Diabetes ([facility] [address]     Diarrhea, unspecified     Disorder of thyroid     hypothyroid    Easy bruising     Flatulence/gas pain/belching     Frequent urination     Frequent UTI     Headache disorder     High blood pressure     High cholesterol     History of blood transfusion 10/2021    2 units PRBC    History of cardiac murmur     History of stomach ulcers     Leaking of urine     Lumbosacral radiculopathy     Microcytic anemia     Migraines     Morbid obesity with BMI of 40.0-44.9, adult (HCC)     Murmur     Nausea     Neuropathy     both feet    PAULA (obstructive sleep apnea) 21 ESC PSG    AHI 20.2 REM AHI 70.5 Supine AHI 15.8 Lateral AHI 34.8    Osteoarthritis     Pain in joints     PONV (postoperative nausea and vomiting)     Prediabetes     S/P lumbar fusion     Sleep apnea     bipap    Stress     Stroke ([facility] [address]     mini stroke    Thyroid activity decreased     Uncomfortable fullness after meals     Visual impairment     glasses    Wears glasses        PAST SURGICAL HISTORY:  Past Surgical History:   Procedure Laterality Date    ARTHROSCOPY OF JOINT UNLISTED Right 1999    knee x 2    ARTHROSCOPY OF JOINT UNLISTED Left 2003    knee x 2    BACK SURGERY      LUMBAR SURGERY/HARDWARE/RODS    BOWEL RESECTION        1980,1983,1984,    CARPAL TUNNEL RELEASE Bilateral , , -    bilateral x 2     CATARACT Bilateral     CHOLECYSTECTOMY  10/1980    open    COLECTOMY  2004 & 2004    COLONOSCOPY N/A 08/10/2017    Procedure: COLONOSCOPY;  Surgeon: Byron Brown MD;  Location: [facility] ENDOSCOPY    COLONOSCOPY      COLONOSCOPY N/A 2023 Procedure: COLONOSCOPY;  Surgeon: Forrest Moss MD;  Location: [facility] ENDOSCOPY    Daniel Dennis  03/2002    kidney stone removed    DILATION/CURETTAGE,DIAGNOSTIC  6/1997, 5/2002    EGD      FOOT SURGERY  02/2003    Rt. foot fx. repair    FOOT SURGERY  09/2002    neuroma left foot    GASTRIC BYPASS,OBESE<100CM BERNARDO-EN-Y  10/2000    GASTRIC BYPASS,OBESITY,[address]      HERNIA SURGERY  06/2003    ventral & abdominoplasty    KNEE REPLACEMENT SURGERY      OTHER  12/01/2021    EXPLORATION OF HARDWARE AT LUMBAR 4/ LUMBAR 5, EXPLORATION OF FUSION, ARTHRODESIS OF LUMBAR 4/LUMBAR 5, ALLOGRAFT    OTHER SURGICAL HISTORY  06/2009    removal cement piece right knee    OTHER SURGICAL HISTORY  10/2004    extraction of all her teeth    OTHER SURGICAL HISTORY  08/2003 & 8/2013    open abdominal wound repair    PART REMOVAL COLON W END COLOSTOMY      SPINE SURGERY PROCEDURE UNLISTED      TOTAL KNEE REPLACEMENT Left 10/2006 & 4/2010    TOTAL KNEE REPLACEMENT Right     x3    TUBAL LIGATION  09/1984    UPPER GI ENDOSCOPY,EXAM  04/2006 & 07/2007       FAMILY HISTORY:  family history includes Cancer in her brother, brother, and sister; Abdiel Nugent in her maternal aunt and maternal uncle; Diabetes in her brother, maternal aunt, maternal uncle, mother, sister, and sister; Heart Disorder in her father and mother; Hypertension in her father and mother; Other in her mother. SOCIAL HISTORY:   reports that she quit smoking about 39 years ago. Her smoking use included cigarettes. She started smoking about 51 years ago. She has a 1.50 pack-year smoking history. She has never used smokeless tobacco. She reports that she does not drink alcohol and does not use drugs. ALLERGIES:    Adhesive Tape (Kristin*    OTHER (SEE COMMENTS)    Comment:Other reaction(s): Redness/skin breakdown. Ok with             paper tape.     MEDICATIONS:  Current Outpatient Medications   Medication Sig Dispense Refill    nortriptyline 50 MG Oral Cap Take 1 capsule (50 mg total) by mouth nightly. 30 capsule 2    HYDROcodone-acetaminophen 5-325 MG Oral Tab Take 1 tablet by mouth every 6 (six) hours as needed. 15 tablet 0    apixaban 5 MG Oral Tab Take 1 tablet (5 mg total) by mouth 2 (two) times daily. 60 tablet 0    traMADol 50 MG Oral Tab Take 1 tablet (50 mg total) by mouth every 6 (six) hours. 40 tablet 0    Naloxone HCl 4 MG/0.1ML Nasal Liquid 4 mg by Nasal route as needed. If patient remains unresponsive, repeat dose in other nostril 2-5 minutes after first dose. 1 kit 0    pantoprazole 40 MG Oral Tab EC Take one tablet (40 mg total) by mouth 30 minutes prior to breakfast and dinner 180 tablet 3    butalbital-acetaminophen-caffeine -40 MG Oral Tab Take at onset of migraine, can repeat in 4-6 hours. Do not take more than 2-3 times a week 15 tablet 0    benzonatate 200 MG Oral Cap Take 1 capsule (200 mg total) by mouth 3 (three) times daily. azelastine 0.1 % Nasal Solution 2 sprays by Nasal route 2 (two) times daily. 30 mL 3    dicyclomine 20 MG Oral Tab Take 1 tablet (20 mg total) by mouth 3 (three) times daily as needed (abdominal pain). 90 tablet 1    pregabalin (LYRICA) 100 MG Oral Cap Week 1-2: take 100mg twice a day, Week 3: if foot pain not improved, increase to 100mg 3 times a day (Patient taking differently: Take 1 capsule (100 mg total) by mouth in the morning, at noon, and at bedtime. Week 1-2: take 100mg twice a day, Week 3: if foot pain not improved, increase to 100mg 3 times a day) 90 capsule 2    sertraline 50 MG Oral Tab Take 1 tablet (50 mg total) by mouth daily. rosuvastatin 20 MG Oral Tab Take 1 tablet (20 mg total) by mouth nightly. Oxybutynin Chloride ER 15 MG Oral Tablet 24 Hr Take 1 tablet (15 mg total) by mouth daily. 90 tablet 6    PANTOPRAZOLE 40 MG Oral Tab EC TAKE 1 TABLET BY MOUTH TWICE DAILY BEFORE MEALS 60 tablet 0    sucralfate 1 g Oral Tab Take 1 tablet (1 g total) by mouth daily.       Multiple Vitamins-Minerals (MULTIVITAMIN ADULTS 50+ OR) 2 gummies daily      metFORMIN 500 MG Oral Tab Take 1 tablet (500 mg total) by mouth 2 (two) times daily with meals. Pre-diabetic      Levothyroxine Sodium 75 MCG Oral Tab Take 1 tablet (75 mcg total) by mouth before breakfast.      ergocalciferol 67639 units Oral Cap Take 1 capsule (50,000 Units total) by mouth every 30 (thirty) days. Potassium Chloride ER (K-DUR M20) 20 MEQ Oral Tab CR Take 2 tablets (40 mEq total) by mouth 4 (four) times daily. spironolactone (ALDACTONE) 25 MG Oral Tab Take 1 tablet (25 mg total) by mouth 2 (two) times daily. torsemide (DEMADEX) 20 MG Oral Tab Take 1 tablet (20 mg total) by mouth 2 (two) times daily. Losartan Potassium (COZAAR) 25 MG Oral Tab Take 1 tablet (25 mg total) by mouth daily. REVIEW OF SYSTEMS:    GENERAL HEALTH:  feels well, calm, normal appetite,   EYES: no visual complaints or deficits  HEENT: denies nasal congestion, sinus pain or sore throat; no  hearing loss;  RESPIRATORY: denies shortness of breath, wheezing or cough   CARDIOVASCULAR: denies chest pain, no palpitations   GI: denies nausea, vomiting, constipation, diarrhea; no rectal bleeding; no heartburn  GENITAL/: no dysuria, urgency or frequency;  no hernias; no nocturia  GYNE/: no dysuria, urgency or frequency; no urinary incontinence  MUSCULOSKELETAL: no joint complaints in  upper or lower extremities  NEURO: see HPI;  PSYCHE: no symptoms of depression or anxiety  HEMATOLOGY:  denies bruising or excessive bleeding  ENDOCRINE: denies excessive thirst or urination; denies unexpected wt gain or wt loss  ALLERGY/IMM.: denies food or seasonal allergies      PHYSICAL EXAMINATION:  VITAL SIGNS: /74   Pulse 64   Resp 16   LMP 05/23/2004    There is no height or weight on file to calculate BMI. General:  Patient is a 79year old female in no acute distress.   appearance: Normal developed and well nourished , in no acute stress; HEENT:  Normal conjunctiva, no abnormal secretion,   Neck supple,  No carotid bruit,  thyroid normal  Lungs are clear to auscultation  Heart: normal SR, no murmur  Extremities:  No edema or cyanosis, pulse is normal.  Skin:  No unusual lesions, some varicose vein,     Neurologic Examination:  Mental status: he is awake, alert, oriented to time, name and place, Mentally appropriate  for age;  Language and speech: language is intact in expression and comprehension, no dysarthria, voice is normal in volume, follow commends well;  Memory and comprehension:  MMSE is normal,   Cranial Nerves       CN II:  Visual fields full, Pupils equal and reactive, Fundi normal       CN III, IV, VI:  Horrizontal and vertical movements normal.  Normal pursuit and saccades. No nystagmus, no ptosis       CN V: Masseters strong, Facial sensations normal,        CN  VII:  Symmetric facial movement       CN VIII:  Normal hearing       CN IX, XI:  Normal Gag, uvula palate midline       CN XII:  SCM strong, equal shoulder shrug  Motor:  No drift, rapid finger movement symmetric. R DF 0-1/5, eversion 2, R PF 5, remainder RLE 5/5, and elsewhere 5/5  Sensory;   Decreased PP below the knee, temperature, normal proprioception; vibration 8 sec  DTRs; Symmetric in both arms and legs, including biceps, triceps, absent in knees, absent in ankles,  Babinski sign is negative; Coordination: Normal FTN and HTS;   Gait: Normal based, slow, cautious,  Romberg sign is negative, Tandem walk is abnormal      EMG  IMPRESSION:  This is an abnormal study. The test results revealed diffuse sensorimotor neuropathy, axonal in nature, of at least a moderate to severe degree. There is also superimposed chronic bilateral mid and lower lumbar radiculopathy, more so on the right than the left, chronic and active in nature. Correlation with lumbar spine imaging study may be helpful for further evaluation.   Clinical correlation is indicated. Lumbar spine MRI    CONCLUSION:         1. There is been a prior discectomy and posterior fusion at L4-5 with residual disc bulge/osteophyte complex posterior laterally causing moderate bilateral neural foraminal narrowing, slightly worse on the left than the right. There is overall no   significant change since the study of 2018. 2. Mild degenerative disc disease at L5-S1. Dictated by (CST): Wes Weinberg MD on 5/05/2021 at 4:07 PM       Finalized by (CST): Wes Weinberg MD on 5/05/2021 at 4:15 PM         MRI brain  CONCLUSION:         1. No acute intracranial abnormality identified. 2. A stable small old infarct is noted in the right basal ganglia extending into the right external capsule. Minimal chronic microvascular ischemic changes in the cerebral white matter. Dictated by (CST): Ileana Hills MD on 4/26/2022 at 11:36 AM       Finalized by (CST): Ileana Hills MD on 4/26/2022 at 11:39 AM         Carotid doppler:  CONCLUSION:  Mild atheromatous plaque at the bifurcations with 0-50% stenoses within the internal carotid arteries bilaterally utilizing velocity and ICA/CCA ratio criteria. 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 Elevate leg as much as possible, avoid crossing legs. Start PT  Obtain EMG    Polyneuropathy- Has history of B12 deficiency, and stopped injections a few years ago. 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 DM continues to be well controlled. Neuropathic pain- gabapentin not effective, Lyrica not effective either at 100mg TID.    Trial 50mg higher dose of nortriptyline (did 20mg prior to surgery, then stopped, was not effective). Migraines- start higher dose nortriptyline. Educated on prevention of [address] and best way to take abortive medication. Start fiorcet, and prophylactic nortriptyline, for dual neuropathy and migraine prophylaxis. Lumbar radiculopathy, s/p surgery- continue PT exercises    BG stroke- subsequently found to have afib, continue AC (on eliquis) and statin      Requested Prescriptions     Signed Prescriptions Disp Refills    nortriptyline 50 MG Oral Cap 30 capsule 2     Sig: Take 1 capsule (50 mg total) by mouth nightly. We discussed in depth regarding the diagnosis, prognosis, treatment. The patient was given ample opportunity to ask questions. All questions and concerns were addressed. 35 minutes were spent on this encounter, which included obtaining and reviewing history, examining the patient, reviewing and interpreting results, building a treatment plan, discussing treatment options, discussing medication instructions, educating the patient/family/caregiver, and completing documentation.        Wayne Waller,   Neurology and Neuromuscular medicine  [facility]

## 2023-07-12 NOTE — PROGRESS NOTES
Patient here to follow up for new/worsening symptoms after knee replacement surgery. Right leg pain has gotten worse and now having foot drop on that side as well.

## 2023-10-02 RX ORDER — NORTRIPTYLINE HYDROCHLORIDE 50 MG/1
50 CAPSULE ORAL NIGHTLY
Qty: 30 CAPSULE | Refills: 5 | Status: SHIPPED | OUTPATIENT
Start: 2023-10-02

## 2023-10-02 NOTE — TELEPHONE ENCOUNTER
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 Elevate leg as much as possible, avoid crossing legs. Start PT  Obtain EMG     Polyneuropathy- Has history of B12 deficiency, and stopped injections a few years ago. 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 DM continues to be well controlled. Neuropathic pain- gabapentin not effective, Lyrica not effective either at 100mg TID. Trial 50mg higher dose of nortriptyline (did 20mg prior to surgery, then stopped, was not effective). Migraines- start higher dose nortriptyline. Educated on prevention of [address] and best way to take abortive medication. Start fiorcet, and prophylactic nortriptyline, for dual neuropathy and migraine prophylaxis.       Lumbar radiculopathy, s/p surgery- continue PT exercises     BG stroke- subsequently found to have afib, continue AC (on eliquis) and statin

## 2023-10-05 NOTE — H&P
History & Physical Examination    Patient Name: Matt Burnette  MRN: [id number]  [facility]: [id number]  YOB: 1955    Pre-Operative Diagnosis:  History of knee replacement, total, bilateral [Z96.653]  Chronic pain of both knees [M25.561, M25.562, G89. Tab Take 40 mg by mouth nightly. Disp:  Rfl:  Taking   Levothyroxine Sodium (SYNTHROID, LEVOTHROID) 25 MCG Oral Tab Take 50 mcg by mouth before breakfast.   Disp:  Rfl:  Taking   torsemide (DEMADEX) 20 MG Oral Tab Take 20 mg by mouth 2 (two) times daily. Constance Garcia      Comment: kidney stone removed  6/1997, 5/2002: DILATION/CURETTAGE,DIAGNOSTIC  02/2003: FOOT SURGERY      Comment: Rt. foot fx. repair  09/2002: FOOT SURGERY      Comment: neuroma left foot  8/2000: GASTRIC BYPASS,OBESIT stated

## 2023-10-31 ENCOUNTER — OFFICE VISIT (OUTPATIENT)
Dept: ELECTROPHYSIOLOGY | Facility: HOSPITAL | Age: 68
DRG: 074 | End: 2023-10-31
Attending: Other

## 2023-10-31 DIAGNOSIS — M54.16 LUMBAR RADICULOPATHY, RIGHT: Primary | ICD-10-CM

## 2023-10-31 DIAGNOSIS — G57.01 COMMON PERONEAL NEUROPATHY OF RIGHT LOWER EXTREMITY: ICD-10-CM

## 2023-10-31 PROCEDURE — 95910 NRV CNDJ TEST 7-8 STUDIES: CPT | Performed by: OTHER

## 2023-10-31 PROCEDURE — 95886 MUSC TEST DONE W/N TEST COMP: CPT | Performed by: OTHER

## 2023-10-31 NOTE — PROCEDURES
[facility]  Nerve Conduction & EMG Report                      Krishan Garcia, [address]   EMG department   [address] [facility] [address], [address]  [phone]          Patient name: Boni Crain  YOB: 1955  Referring provider: Dr. Edmundo Dominguez   Reason for study: Eval peroneal neuropathy  Brief history: 79year old female with 4-month history of right foot drop, with paresthesias in the right lateral leg. Sensory and motor nerve conduction studies, and needle EMG:  Please see separate sheet for waveforms and quantitative nerve conduction data, as well as for tabulated form of electromyographic data. Summary:   1. The bilateral sural sensory responses were absent. 2.  The bilateral tibial motor responses had decreased amplitudes. 3.  The right peroneal motor response was absent. The left peroneal motor response had very low amplitude. 4.  The tibialis anterior motor responses of the right side showed no slowing of conduction velocity at the knee. However the knee amplitude was 30% decreased from the fibular head. The amplitude overall was comparable to that on the left side. 5.  Needle EMG using a concentric needle was performed on selected muscles of the right lower extremity. There were positive sharp waves and fibrillations seen in both the gastrocnemius and the tibialis anterior on the right. There was chronic motor unit remodeling seen in these muscles, as well as in the right tibialis posterior. Conclusion:  1. There is suggestion of a right compressive peroneal mononeuropathy at the knee. Of note, there is partial limitation of analysis due to presence of concomitant lumbar radiculopathy and polyneuropathy in this extremity. 2.  There is electrophysiologic evidence of a marked sensory motor axonal polyneuropathy. 3.  There is evidence of a right subacute on chronic L5-S1 right lumbar radiculopathy.       Krishan Garcia DO  Neurology and Neuromuscular medicine  [facility]

## 2023-11-28 ENCOUNTER — OFFICE VISIT (OUTPATIENT)
Dept: HEMATOLOGY/ONCOLOGY | Facility: HOSPITAL | Age: 68
End: 2023-11-28
Attending: INTERNAL MEDICINE
Payer: MEDICARE

## 2023-11-28 VITALS
HEART RATE: 93 BPM | SYSTOLIC BLOOD PRESSURE: 165 MMHG | RESPIRATION RATE: 16 BRPM | DIASTOLIC BLOOD PRESSURE: 70 MMHG | TEMPERATURE: 97 F | BODY MASS INDEX: 41 KG/M2 | OXYGEN SATURATION: 94 % | WEIGHT: 238.63 LBS

## 2023-11-28 DIAGNOSIS — D50.0 IRON DEFICIENCY ANEMIA DUE TO CHRONIC BLOOD LOSS: ICD-10-CM

## 2023-11-28 DIAGNOSIS — D47.2 MGUS (MONOCLONAL GAMMOPATHY OF UNKNOWN SIGNIFICANCE): Primary | ICD-10-CM

## 2023-11-28 LAB
ALBUMIN SERPL-MCNC: 3.7 G/DL (ref 3.4–5)
ALBUMIN/GLOB SERPL: 0.9 {RATIO} (ref 1–2)
ALP LIVER SERPL-CCNC: 95 U/L
ALT SERPL-CCNC: 26 U/L
ANION GAP SERPL CALC-SCNC: 5 MMOL/L (ref 0–18)
AST SERPL-CCNC: 31 U/L (ref 15–37)
BASOPHILS # BLD AUTO: 0.03 X10(3) UL (ref 0–0.2)
BASOPHILS NFR BLD AUTO: 0.5 %
BILIRUB SERPL-MCNC: 0.6 MG/DL (ref 0.1–2)
BUN BLD-MCNC: 15 MG/DL (ref 9–23)
CALCIUM BLD-MCNC: 9.1 MG/DL (ref 8.5–10.1)
CHLORIDE SERPL-SCNC: 108 MMOL/L (ref 98–112)
CO2 SERPL-SCNC: 29 MMOL/L (ref 21–32)
CREAT BLD-MCNC: 1.05 MG/DL
DEPRECATED HBV CORE AB SER IA-ACNC: 23 NG/ML
EGFRCR SERPLBLD CKD-EPI 2021: 58 ML/MIN/1.73M2 (ref 60–?)
EOSINOPHIL # BLD AUTO: 0.1 X10(3) UL (ref 0–0.7)
EOSINOPHIL NFR BLD AUTO: 1.7 %
ERYTHROCYTE [DISTWIDTH] IN BLOOD BY AUTOMATED COUNT: 14.4 %
FASTING STATUS PATIENT QL REPORTED: NO
GLOBULIN PLAS-MCNC: 3.9 G/DL (ref 2.8–4.4)
GLUCOSE BLD-MCNC: 106 MG/DL (ref 70–99)
HCT VFR BLD AUTO: 40.7 %
HGB BLD-MCNC: 13.7 G/DL
IMM GRANULOCYTES # BLD AUTO: 0.02 X10(3) UL (ref 0–1)
IMM GRANULOCYTES NFR BLD: 0.3 %
IRON SATN MFR SERPL: 13 %
IRON SERPL-MCNC: 70 UG/DL
LYMPHOCYTES # BLD AUTO: 1.16 X10(3) UL (ref 1–4)
LYMPHOCYTES NFR BLD AUTO: 20.2 %
MCH RBC QN AUTO: 28.5 PG (ref 26–34)
MCHC RBC AUTO-ENTMCNC: 33.7 G/DL (ref 31–37)
MCV RBC AUTO: 84.8 FL
MONOCYTES # BLD AUTO: 0.6 X10(3) UL (ref 0.1–1)
MONOCYTES NFR BLD AUTO: 10.4 %
NEUTROPHILS # BLD AUTO: 3.84 X10 (3) UL (ref 1.5–7.7)
NEUTROPHILS # BLD AUTO: 3.84 X10(3) UL (ref 1.5–7.7)
NEUTROPHILS NFR BLD AUTO: 66.9 %
OSMOLALITY SERPL CALC.SUM OF ELEC: 295 MOSM/KG (ref 275–295)
PLATELET # BLD AUTO: 200 10(3)UL (ref 150–450)
POTASSIUM SERPL-SCNC: 3.8 MMOL/L (ref 3.5–5.1)
PROT SERPL-MCNC: 7.6 G/DL (ref 6.4–8.2)
RBC # BLD AUTO: 4.8 X10(6)UL
SODIUM SERPL-SCNC: 142 MMOL/L (ref 136–145)
TIBC SERPL-MCNC: 551 UG/DL (ref 240–450)
TRANSFERRIN SERPL-MCNC: 370 MG/DL (ref 200–360)
WBC # BLD AUTO: 5.8 X10(3) UL (ref 4–11)

## 2023-11-28 PROCEDURE — 99214 OFFICE O/P EST MOD 30 MIN: CPT | Performed by: INTERNAL MEDICINE

## 2023-12-01 ENCOUNTER — APPOINTMENT (OUTPATIENT)
Dept: HEMATOLOGY/ONCOLOGY | Facility: HOSPITAL | Age: 68
End: 2023-12-01
Attending: INTERNAL MEDICINE
Payer: MEDICARE

## 2023-12-05 PROBLEM — R73.02 IGT (IMPAIRED GLUCOSE TOLERANCE): Status: ACTIVE | Noted: 2023-06-13

## 2023-12-05 PROBLEM — E03.9 ACQUIRED HYPOTHYROIDISM: Status: ACTIVE | Noted: 2023-06-13

## 2023-12-07 ENCOUNTER — OFFICE VISIT (OUTPATIENT)
Dept: HEMATOLOGY/ONCOLOGY | Facility: HOSPITAL | Age: 68
End: 2023-12-07
Attending: INTERNAL MEDICINE
Payer: MEDICARE

## 2023-12-07 VITALS
OXYGEN SATURATION: 98 % | TEMPERATURE: 98 F | SYSTOLIC BLOOD PRESSURE: 115 MMHG | DIASTOLIC BLOOD PRESSURE: 48 MMHG | HEART RATE: 72 BPM | RESPIRATION RATE: 16 BRPM

## 2023-12-07 DIAGNOSIS — D50.0 IRON DEFICIENCY ANEMIA DUE TO CHRONIC BLOOD LOSS: Primary | ICD-10-CM

## 2023-12-07 PROCEDURE — 96365 THER/PROPH/DIAG IV INF INIT: CPT

## 2023-12-07 NOTE — PROGRESS NOTES
Education Record    Learner:  Patient and Spouse    Disease / Diagnosis:iron deficiency    Barriers / Limitations:  None   Comments:    Method:  Discussion   Comments:    General Topics:  Medication and Plan of care reviewed   Comments:    Outcome:  Shows understanding   Comments: Tolerated iron infusion without incident. Observed for 30 minutes post infusion and vital signs recorded. Discharged to home in stable condition.

## 2024-02-05 ENCOUNTER — HOSPITAL ENCOUNTER (OUTPATIENT)
Dept: MAMMOGRAPHY | Facility: HOSPITAL | Age: 69
Discharge: HOME | End: 2024-02-05
Attending: INTERNAL MEDICINE
Payer: MEDICARE

## 2024-02-05 DIAGNOSIS — N63.11 BREAST LUMP ON RIGHT SIDE AT 10 O'CLOCK POSITION: ICD-10-CM

## 2024-02-05 DIAGNOSIS — N63.11 BREAST LUMP ON RIGHT SIDE AT 10 O'CLOCK POSITION: Primary | ICD-10-CM

## 2024-02-05 PROCEDURE — 76642 ULTRASOUND BREAST LIMITED: CPT | Performed by: INTERNAL MEDICINE

## 2024-02-05 PROCEDURE — 77062 BREAST TOMOSYNTHESIS BI: CPT | Performed by: INTERNAL MEDICINE

## 2024-02-05 PROCEDURE — 77066 DX MAMMO INCL CAD BI: CPT | Performed by: INTERNAL MEDICINE

## 2024-02-05 NOTE — IMAGING NOTE
This [facility] RN assisted Dr. Gant with recommendation for a right breast 1 site ultrasound guided biopsy for palpable mass. Procedure reviewed and all questions answered. Emotional and educational support given. On the day of the biopsy, pt instructed to take Tylenol 1000mg PO, eat a light meal & bring or wear a sports bra.  Post biopsy care also reviewed with pt to include NO lifting more than 5lbs, no exercising or housework (limit upper body movement) for 24-48 hrs post biopsy. Patient denies bleeding disorders, liver disease, and chemo. Patient does take blood thinner Eliquis prescribed by her cardiologist. Patient instructed to call her cardiologist for permission to stop the Eliquis for the recommended 48 hours before her biopsy. Lab order for PT/ INR placed. Pt verbalized understanding. Our breast center schedulers will be calling to schedule an appt that is convenient for pt.

## 2024-02-10 DIAGNOSIS — R35.0 URINARY FREQUENCY: ICD-10-CM

## 2024-02-12 RX ORDER — OXYBUTYNIN CHLORIDE 15 MG/1
15 TABLET, EXTENDED RELEASE ORAL DAILY
Qty: 90 TABLET | Refills: 0 | Status: SHIPPED | OUTPATIENT
Start: 2024-02-12

## 2024-02-12 NOTE — TELEPHONE ENCOUNTER
Pt's last OV was on 8/19/22.  No future appointments.   [employer] [facility] [employer] message sent.

## 2024-02-14 ENCOUNTER — HOSPITAL ENCOUNTER (OUTPATIENT)
Dept: MAMMOGRAPHY | Facility: HOSPITAL | Age: 69
Discharge: HOME | End: 2024-02-14
Attending: INTERNAL MEDICINE
Payer: MEDICARE

## 2024-02-14 ENCOUNTER — LAB ENCOUNTER (OUTPATIENT)
Dept: LAB | Age: 69
End: 2024-02-14
Attending: Other
Payer: MEDICARE

## 2024-02-14 DIAGNOSIS — R92.8 OTHER ABNORMAL AND INCONCLUSIVE FINDINGS ON DIAGNOSTIC IMAGING OF BREAST: ICD-10-CM

## 2024-02-14 DIAGNOSIS — N63.0 BREAST MASS: ICD-10-CM

## 2024-02-14 DIAGNOSIS — G62.9 POLYNEUROPATHY: ICD-10-CM

## 2024-02-14 DIAGNOSIS — Z98.84 HISTORY OF GASTRIC BYPASS: ICD-10-CM

## 2024-02-14 DIAGNOSIS — N63.11 BREAST LUMP ON RIGHT SIDE AT 10 O'CLOCK POSITION: ICD-10-CM

## 2024-02-14 LAB
FOLATE SERPL-MCNC: 25.9 NG/ML (ref 8.7–?)
INR BLD: 1 (ref 0.8–1.2)
PROTHROMBIN TIME: 13.2 SECONDS (ref 11.6–14.8)
VIT B12 SERPL-MCNC: 1341 PG/ML (ref 193–986)

## 2024-02-14 PROCEDURE — 82607 VITAMIN B-12: CPT

## 2024-02-14 PROCEDURE — 36415 COLL VENOUS BLD VENIPUNCTURE: CPT

## 2024-02-14 PROCEDURE — 82746 ASSAY OF FOLIC ACID SERUM: CPT

## 2024-02-14 PROCEDURE — 85610 PROTHROMBIN TIME: CPT

## 2024-02-14 PROCEDURE — 82525 ASSAY OF COPPER: CPT

## 2024-02-14 PROCEDURE — 88305 TISSUE EXAM BY PATHOLOGIST: CPT | Performed by: INTERNAL MEDICINE

## 2024-02-14 PROCEDURE — 77065 DX MAMMO INCL CAD UNI: CPT | Performed by: INTERNAL MEDICINE

## 2024-02-14 PROCEDURE — 84207 ASSAY OF VITAMIN B-6: CPT

## 2024-02-14 PROCEDURE — 88342 IMHCHEM/IMCYTCHM 1ST ANTB: CPT | Performed by: INTERNAL MEDICINE

## 2024-02-14 PROCEDURE — 19083 BX BREAST 1ST LESION US IMAG: CPT | Performed by: INTERNAL MEDICINE

## 2024-02-14 PROCEDURE — 84630 ASSAY OF ZINC: CPT

## 2024-02-14 PROCEDURE — 84425 ASSAY OF VITAMIN B-1: CPT

## 2024-02-16 ENCOUNTER — TELEPHONE (OUTPATIENT)
Dept: MAMMOGRAPHY | Facility: HOSPITAL | Age: 69
End: 2024-02-16

## 2024-02-19 LAB — VITAMIN B1 WHOLE BLD: 154.9 NMOL/L

## 2024-02-21 LAB
COPPER: 101 UG/DL
VITAMIN B6: 15.6 UG/L
ZINC: 73 UG/DL

## 2024-04-30 DIAGNOSIS — G43.009 MIGRAINE WITHOUT AURA AND WITHOUT STATUS MIGRAINOSUS, NOT INTRACTABLE: Primary | ICD-10-CM

## 2024-04-30 RX ORDER — NORTRIPTYLINE HYDROCHLORIDE 50 MG/1
50 CAPSULE ORAL NIGHTLY
Qty: 30 CAPSULE | Refills: 2 | Status: SHIPPED | OUTPATIENT
Start: 2024-04-30

## 2024-04-30 NOTE — TELEPHONE ENCOUNTER
Medication: NORTRIPTYLINE 50 MG Oral Cap      Date of last refill: 10/02/12367 (#30/5)  [place] N?A     Last office visit: 07/12/2023  Due back to clinic per last office note:  Around 11/12/2023  Date next office visit scheduled:    No future appointments.        Last OV note recommendation:    Impression/Plan:  Common peroneal neuropathy of right lower extremity  (primary encounter diagnosis)  Polyneuropathy  Lumbosacral radiculopathy  Migraine without aura and without status migrainosus, not intractable  Basal ganglia stroke (hcc)     Right common peroneal neuropathy- likely stretch injury from R knee surgery, or compression from edema around knee. Elevate leg as much as possible, avoid crossing legs. Start PT  Obtain EMG     Polyneuropathy- Has history of B12 deficiency, and stopped injections a few years ago. 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 DM continues to be well controlled. Neuropathic pain- gabapentin not effective, Lyrica not effective either at 100mg TID.   Trial 50mg higher dose of nortriptyline (did 20mg prior to surgery, then stopped, was not effective).     Migraines- start higher dose nortriptyline. Educated on prevention of MOH and best way to take abortive medication. Start fiorcet, and prophylactic nortriptyline, for dual neuropathy and migraine prophylaxis.      Lumbar radiculopathy, s/p surgery- continue PT exercises     BG stroke- subsequently found to have afib, continue AC (on eliquis) and statin

## 2024-05-12 DIAGNOSIS — R35.0 URINARY FREQUENCY: ICD-10-CM

## 2024-05-16 RX ORDER — OXYBUTYNIN CHLORIDE 15 MG/1
15 TABLET, EXTENDED RELEASE ORAL DAILY
Qty: 90 TABLET | Refills: 0 | OUTPATIENT
Start: 2024-05-16

## 2024-05-16 NOTE — TELEPHONE ENCOUNTER
Message sent to my chart Pt's last OV was on 8/19/22.  No future appt's.  Refill denied.  Broadcastrt message sent to pt.

## 2024-05-22 NOTE — PROGRESS NOTES
[facility] Note    Diagnosis:   IgG Lambda MGUS  Microcytic anemia 2/2 GI Bleed, Gastric bypass    Treatment History: n/a  1. IV InFED 10/27/21: Ferritin 8 to 52, HB 9.3 to 11.5 to 14.9    Visit Diagnosis:  1. Iron deficiency anemia due to chronic blood loss    2. MGUS (monoclonal gammopathy of unknown significance)          History of Present Illness: 67F with a PMH of DM2, HTN, A fiib on Eliquis and Gastric bypass was referred by Dr. Thomas for anemia. She has a history of MAYE 2/2 gastric bypass. She also has a history of IgG Lambda MGUS on observation.     Anemia History   She presented to the ER on 10/4/21 with anemia which was noted on pre-op labs. She has a known history of an anastomotic ulcer from 2019. She had been taking 6-8 naproxen daily for back pain. CBC from 02/2019 showed a HB of 12.4 and MCV of 81. A follow up CBC from 10/1/19 showed a HB of 10.5 and MCV of 77. CBC from 9/30/21 showed: HB 6.5, MCV 64. She received 2 units of blood and 200 mg of Venofer. SPEP showed a normal K/L ratio, small IgG Lambda M spike of 0.10. CA 8.4, Cr 1.06. She had an EGD done on 10/8/21 but results are not available. She states that the EGD showed an inflamed/angry ulcer. She has chronic back pain. No fevers, chills, night sweats or weight loss.   IV InFED 10/27/21: Ferritin 8 to 52, HB 9.3 to 11.5 to 14.9 (2/2/22)  IV InFED in 12/2023: Ferritin 23, Fe sat 13    IgG Lambda MGUS History   09/2021: Small IgG Lambda M spike (0.10), normal SFLC ratio. Urine negative. PET/CT negative.   Bone marrow 11/2021: 2% plasma cells   08/23/22: Faint IgM M spike, Show Low LC 3.06, Lambda LC 2.36, ratio 1.29, urine bence starkey negative   03/2023: No M spike. Show Low LC 2.687, Lambda 2.03, Ratio 1.32    Interval History:   -Doing ok overall. Does feel fatigued in general. No melena or hematochezia     Review of Systems: 12 Point ROS was completed and pertinent positives are in the HPI    Current Outpatient  Medications on File Prior to Visit   Medication Sig Dispense Refill    nortriptyline 50 MG Oral Cap Take 1 capsule (50 mg total) by mouth nightly. 30 capsule 2    oxyBUTYnin Chloride ER 15 MG Oral Tablet 24 Hr TAKE 1 TABLET(15 MG) BY MOUTH DAILY 90 tablet 0    dicyclomine 20 MG Oral Tab Take 1 tablet (20 mg total) by mouth daily as needed. 90 tablet 11    buPROPion  MG Oral Tablet 24 Hr Take 1 tablet (150 mg total) by mouth every morning.      Glucose Blood (ONETOUCH VERIO) In Vitro Strip       apixaban 5 MG Oral Tab Take 1 tablet (5 mg total) by mouth 2 (two) times daily. 60 tablet 0    azelastine 0.1 % Nasal Solution 2 sprays by Nasal route 2 (two) times daily. 30 mL 3    pregabalin (LYRICA) 100 MG Oral Cap Week 1-2: take 100mg twice a day, Week 3: if foot pain not improved, increase to 100mg 3 times a day (Patient taking differently: Take 1 capsule (100 mg total) by mouth in the morning, at noon, and at bedtime. Week 1-2: take 100mg twice a day, Week 3: if foot pain not improved, increase to 100mg 3 times a day) 90 capsule 2    sertraline 50 MG Oral Tab Take 1 tablet (50 mg total) by mouth daily.      PANTOPRAZOLE 40 MG Oral Tab EC TAKE 1 TABLET BY MOUTH TWICE DAILY BEFORE MEALS 60 tablet 0    sucralfate 1 g Oral Tab Take 1 tablet (1 g total) by mouth daily.      Multiple Vitamins-Minerals (MULTIVITAMIN ADULTS 50+ OR) 2 gummies daily      metFORMIN 500 MG Oral Tab Take 1 tablet (500 mg total) by mouth 2 (two) times daily with meals. Pre-diabetic      Levothyroxine Sodium 75 MCG Oral Tab Take 1 tablet (75 mcg total) by mouth before breakfast.      ergocalciferol 80504 units Oral Cap Take 1 capsule (50,000 Units total) by mouth every 30 (thirty) days.      traMADol 50 MG Oral Tab Take 1 tablet (50 mg total) by mouth every 6 (six) hours. (Patient not taking: Reported on 5/23/2024) 40 tablet 0    benzonatate 200 MG Oral Cap Take 1 capsule (200 mg total) by mouth 3 (three) times daily. (Patient not taking:  Reported on 2024)      rosuvastatin 20 MG Oral Tab Take 1 tablet (20 mg total) by mouth nightly. (Patient not taking: Reported on 2024)       No current facility-administered medications on file prior to visit.     Past Medical History:    Abdominal distention    Abdominal pain    Acute, but ill-defined, cerebrovascular disease    Anxiety    Arthritis    Back pain    Back problem    lower    Bloating    Blood in urine    Bowel obstruction (HCC)    Calculus of kidney    Cataract    DDD (degenerative disc disease), lumbar    Depression    Diabetes ([facility])    Diarrhea, unspecified    Disorder of thyroid    hypothyroid    Easy bruising    Flatulence/gas pain/belching    Frequent urination    Frequent UTI    Headache disorder    High blood pressure    High cholesterol    History of blood transfusion    2 units PRBC    History of cardiac murmur    History of peripheral neuropathy    History of stomach ulcers    Leaking of urine    Lumbosacral radiculopathy    Microcytic anemia    Migraines    Morbid obesity with BMI of 40.0-44.9, adult ([facility])    Murmur    Nausea    Neuropathy    both feet    PAULA (obstructive sleep apnea)    AHI 20.2 REM AHI 70.5 Supine AHI 15.8 Lateral AHI 34.8    Osteoarthritis    Pain in joints    PONV (postoperative nausea and vomiting)    Prediabetes    S/P lumbar fusion    Sleep apnea    bipap    Stress    Stroke ([facility])    mini stroke    Thyroid activity decreased    Uncomfortable fullness after meals    Visual impairment    glasses    Wears glasses     Past Surgical History:   Procedure Laterality Date    Arthroscopy of joint unlisted Right 1999    knee x 2    Arthroscopy of joint unlisted Left 2003    knee x 2    Back surgery      LUMBAR SURGERY/HARDWARE/RODS    Bowel resection        1980,1983,1984,    Carpal tunnel release Bilateral , , -    bilateral x 2     Cataract Bilateral     Cholecystectomy  10/1980    open    Colectomy  2004 & 2004     Colonoscopy N/A 08/10/2017    Procedure: COLONOSCOPY;  Surgeon: Tahir Vu MD;  Location:  ENDOSCOPY    Colonoscopy      Colonoscopy N/A 2023    Procedure: COLONOSCOPY;  Surgeon: Edwin Martin MD;  Location:  ENDOSCOPY    Cystoscopy,remv calculus,simple  2002    kidney stone removed    Dilation/curettage,diagnostic  1997, 2002    Egd      Foot surgery  2003    Rt. foot fx. repair    Foot surgery  2002    neuroma left foot    Gastric bypass,obese<100cm cathy-en-y  10/2000    Gastric bypass,obesity,sb reconstruc      Hernia surgery  2003    ventral & abdominoplasty    Knee replacement surgery      Other  2021    EXPLORATION OF HARDWARE AT LUMBAR 4/ LUMBAR 5, EXPLORATION OF FUSION, ARTHRODESIS OF LUMBAR 4/LUMBAR 5, ALLOGRAFT    Other surgical history  2009    removal cement piece right knee    Other surgical history  10/2004    extraction of all her teeth    Other surgical history  2003 & 2013    open abdominal wound repair    Part removal colon w end colostomy      Spine surgery procedure unlisted      Total knee replacement Left 10/2006 & 2010    Total knee replacement Right     x3    Tubal ligation  1984    Upper gi endoscopy,exam  2006 & 2007     Social History     Socioeconomic History    Marital status: Single   Tobacco Use    Smoking status: Former     Current packs/day: 0.00     Average packs/day: 0.1 packs/day for 15.0 years (1.5 ttl pk-yrs)     Types: Cigarettes     Start date: 1972     Quit date: 1984     Years since quittin.0    Smokeless tobacco: Never   Vaping Use    Vaping status: Never Used   Substance and Sexual Activity    Alcohol use: Never    Drug use: Never      Family History   Problem Relation Age of Onset    Hypertension Father     Heart Disorder Father     Hypertension Mother     Diabetes Mother     Heart Disorder Mother     Other (Other) Mother     Diabetes Sister     Cancer Sister     Cancer Brother     Colon Cancer  Maternal Aunt     Diabetes Maternal Aunt     Colon Cancer Maternal Uncle     Diabetes Maternal Uncle     Cancer Brother     Diabetes Sister     Diabetes Brother        Physical Exam  Height: 162.6 cm (5' 4.02\") (05/23 1257)  Weight: 109.3 kg (241 lb) (05/23 1257)  BSA (Calculated - sq m): 2.12 sq meters (05/23 1257)  Pulse: 81 (05/23 1257)  BP: 120/57 (05/23 1257)  Temp: 97.1 °F (36.2 °C) (05/23 1257)  Do Not Use - Resp Rate: --  SpO2: 95 % (05/23 1257)     General: NAD, AOX3  HEENT: clear op, mmm, no jvd, no scleral icterus  CV: RRR S1S2   Lungs: CTAB. RRR S1S2  Extremities: No edema   Abd: soft nt nd +BS  Neuro: CN: II-XII grossly intact      Results:  Lab Results   Component Value Date    WBC 5.8 11/28/2023    HGB 13.7 11/28/2023    HCT 40.7 11/28/2023    MCV 84.8 11/28/2023    .0 11/28/2023     Lab Results   Component Value Date     11/28/2023    K 3.8 11/28/2023    CO2 29.0 11/28/2023     11/28/2023    BUN 15 11/28/2023    ALB 3.7 11/28/2023       Lab Results   Component Value Date     03/09/2023       Radiology: reviewed     Pathology: reviewed   11/3/21  The bone marrow cellularity is overall mildly increased for the patient's stated age.  There is active trilineage hematopoiesis.  There are no increase in blasts.  There are no significant dysplastic changes.  Plasma cells comprise 2% of all nucleated cells. There are no abnormal lymphoid aggregates.  Iron stores  are present.  No ring sideroblasts are seen.     Immunohistochemistry is performed to evaluate the marrow elements.  There is a mixture of CD3 positive T lymphocytes and CD20 positive B lymphocytes with the T lymphocytes predominating.   highlights the plasma cells. There are scattered blasts as highlighted by CD34.     Correlation with cytogenetic studies is recommended.    Assessment and Plan:  68F with a PMH of DM2, HTN and Gastric bypass was referred by Dr. Thomas for anemia.     Iron Deficiency anemia   -2/2  Gastric bypass, UGIB from anastomotic ulcer and angiodysplasia. Following with Dr. Martin  -S/P IV InFED  -Normal B12/Folic acid  -Repeat CBC and Fe studies   -Discussed that she may need intermittent IV Iron given poor PO absorption due to bypass    IgG Lambda Monoclonal protein: noted 09/30/21  -Likely MGUS since anemia is likely due to iron deficiency.   -24 hour urine negative  -Bone marrow from 11/18/21 with 2% plasma cells   -PET/CT from 11/8/21 normal aside from non-specific uptake on R anterior 7th rib. Follow up MRI negative.   - repeat SPEP     CBC, FE studies in 3 months     [facility]

## 2024-05-23 ENCOUNTER — OFFICE VISIT (OUTPATIENT)
Dept: HEMATOLOGY/ONCOLOGY | Facility: HOSPITAL | Age: 69
End: 2024-05-23
Attending: INTERNAL MEDICINE

## 2024-05-23 VITALS
SYSTOLIC BLOOD PRESSURE: 120 MMHG | OXYGEN SATURATION: 95 % | WEIGHT: 241 LBS | TEMPERATURE: 97 F | DIASTOLIC BLOOD PRESSURE: 57 MMHG | BODY MASS INDEX: 41.15 KG/M2 | RESPIRATION RATE: 18 BRPM | HEART RATE: 81 BPM | HEIGHT: 64.02 IN

## 2024-05-23 DIAGNOSIS — D47.2 MGUS (MONOCLONAL GAMMOPATHY OF UNKNOWN SIGNIFICANCE): ICD-10-CM

## 2024-05-23 DIAGNOSIS — D50.0 IRON DEFICIENCY ANEMIA DUE TO CHRONIC BLOOD LOSS: Primary | ICD-10-CM

## 2024-05-23 LAB
ALBUMIN SERPL-MCNC: 3.6 G/DL (ref 3.4–5)
ALBUMIN/GLOB SERPL: 1 {RATIO} (ref 1–2)
ALP LIVER SERPL-CCNC: 90 U/L
ALT SERPL-CCNC: 36 U/L
ANION GAP SERPL CALC-SCNC: 5 MMOL/L (ref 0–18)
AST SERPL-CCNC: 30 U/L (ref 15–37)
BASOPHILS # BLD AUTO: 0.03 X10(3) UL (ref 0–0.2)
BASOPHILS NFR BLD AUTO: 0.4 %
BILIRUB SERPL-MCNC: 0.4 MG/DL (ref 0.1–2)
BUN BLD-MCNC: 25 MG/DL (ref 9–23)
CALCIUM BLD-MCNC: 9 MG/DL (ref 8.5–10.1)
CHLORIDE SERPL-SCNC: 106 MMOL/L (ref 98–112)
CO2 SERPL-SCNC: 29 MMOL/L (ref 21–32)
CREAT BLD-MCNC: 1.19 MG/DL
DEPRECATED HBV CORE AB SER IA-ACNC: 91.7 NG/ML
EGFRCR SERPLBLD CKD-EPI 2021: 50 ML/MIN/1.73M2 (ref 60–?)
EOSINOPHIL # BLD AUTO: 0.1 X10(3) UL (ref 0–0.7)
EOSINOPHIL NFR BLD AUTO: 1.5 %
ERYTHROCYTE [DISTWIDTH] IN BLOOD BY AUTOMATED COUNT: 12.4 %
GLOBULIN PLAS-MCNC: 3.5 G/DL (ref 2.8–4.4)
GLUCOSE BLD-MCNC: 101 MG/DL (ref 70–99)
HCT VFR BLD AUTO: 36.1 %
HGB BLD-MCNC: 12.5 G/DL
IMM GRANULOCYTES # BLD AUTO: 0.03 X10(3) UL (ref 0–1)
IMM GRANULOCYTES NFR BLD: 0.4 %
IRON SATN MFR SERPL: 24 %
IRON SERPL-MCNC: 100 UG/DL
LYMPHOCYTES # BLD AUTO: 0.98 X10(3) UL (ref 1–4)
LYMPHOCYTES NFR BLD AUTO: 14.7 %
MCH RBC QN AUTO: 30.9 PG (ref 26–34)
MCHC RBC AUTO-ENTMCNC: 34.6 G/DL (ref 31–37)
MCV RBC AUTO: 89.1 FL
MONOCYTES # BLD AUTO: 0.65 X10(3) UL (ref 0.1–1)
MONOCYTES NFR BLD AUTO: 9.7 %
NEUTROPHILS # BLD AUTO: 4.88 X10 (3) UL (ref 1.5–7.7)
NEUTROPHILS # BLD AUTO: 4.88 X10(3) UL (ref 1.5–7.7)
NEUTROPHILS NFR BLD AUTO: 73.3 %
OSMOLALITY SERPL CALC.SUM OF ELEC: 295 MOSM/KG (ref 275–295)
PLATELET # BLD AUTO: 178 10(3)UL (ref 150–450)
POTASSIUM SERPL-SCNC: 4 MMOL/L (ref 3.5–5.1)
PROT SERPL-MCNC: 7.1 G/DL (ref 6.4–8.2)
RBC # BLD AUTO: 4.05 X10(6)UL
SODIUM SERPL-SCNC: 140 MMOL/L (ref 136–145)
TIBC SERPL-MCNC: 419 UG/DL (ref 240–450)
TRANSFERRIN SERPL-MCNC: 281 MG/DL (ref 200–360)
WBC # BLD AUTO: 6.7 X10(3) UL (ref 4–11)

## 2024-05-23 PROCEDURE — 99214 OFFICE O/P EST MOD 30 MIN: CPT | Performed by: INTERNAL MEDICINE

## 2024-05-23 NOTE — PROGRESS NOTES
Patient here for follow-up. Energy levels are poor. States she has noticed more easy bruising, but believes it may be related to her Eliquis. Denies any additional symptoms at this time.

## 2024-06-03 LAB
ALBUMIN SERPL ELPH-MCNC: 3.94 G/DL (ref 3.75–5.21)
ALBUMIN/GLOB SERPL: 1.54 {RATIO} (ref 1–2)
ALPHA1 GLOB SERPL ELPH-MCNC: 0.29 G/DL (ref 0.19–0.46)
ALPHA2 GLOB SERPL ELPH-MCNC: 0.87 G/DL (ref 0.48–1.05)
B-GLOBULIN SERPL ELPH-MCNC: 0.81 G/DL (ref 0.68–1.23)
GAMMA GLOB SERPL ELPH-MCNC: 0.6 G/DL (ref 0.62–1.7)
KAPPA LC FREE SER-MCNC: 3.16 MG/DL (ref 0.33–1.94)
KAPPA LC FREE/LAMBDA FREE SER NEPH: 1.13 {RATIO} (ref 0.26–1.65)
LAMBDA LC FREE SERPL-MCNC: 2.79 MG/DL (ref 0.57–2.63)
PROT SERPL-MCNC: 6.5 G/DL (ref 5.7–8.2)

## 2024-08-11 DIAGNOSIS — G43.009 MIGRAINE WITHOUT AURA AND WITHOUT STATUS MIGRAINOSUS, NOT INTRACTABLE: ICD-10-CM

## 2024-08-12 NOTE — TELEPHONE ENCOUNTER
Sent the patient a gopogo message stating to make an appointment for further medication refills.       Medication: NORTRIPTYLINE 50 MG Oral Cap      Date of last refill: 04/30/2024 (#30/2)  [place]     Last office visit: 07/12/2023  Due back to clinic per last office note:  Around 11/12/2023  Date next office visit scheduled:    No future appointments.        Last OV note recommendation:    Impression/Plan:  Common peroneal neuropathy of right lower extremity  (primary encounter diagnosis)  Polyneuropathy  Lumbosacral radiculopathy  Migraine without aura and without status migrainosus, not intractable  Basal ganglia stroke (hcc)     Right common peroneal neuropathy- likely stretch injury from R knee surgery, or compression from edema around knee. Elevate leg as much as possible, avoid crossing legs. Start PT  Obtain EMG     Polyneuropathy- Has history of B12 deficiency, and stopped injections a few years ago. 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 DM continues to be well controlled. Neuropathic pain- gabapentin not effective, Lyrica not effective either at 100mg TID.   Trial 50mg higher dose of nortriptyline (did 20mg prior to surgery, then stopped, was not effective).     Migraines- start higher dose nortriptyline. Educated on prevention of MOH and best way to take abortive medication. Start fiorcet, and prophylactic nortriptyline, for dual neuropathy and migraine prophylaxis.      Lumbar radiculopathy, s/p surgery- continue PT exercises     BG stroke- subsequently found to have afib, continue AC (on eliquis) and statin

## 2024-08-20 RX ORDER — NORTRIPTYLINE HYDROCHLORIDE 50 MG/1
50 CAPSULE ORAL NIGHTLY
Qty: 30 CAPSULE | Refills: 0 | Status: SHIPPED | OUTPATIENT
Start: 2024-08-20

## 2024-10-22 ENCOUNTER — OFFICE VISIT (OUTPATIENT)
Dept: HEMATOLOGY/ONCOLOGY | Facility: HOSPITAL | Age: 69
End: 2024-10-22
Attending: INTERNAL MEDICINE
Payer: MEDICARE

## 2024-10-22 VITALS
TEMPERATURE: 98 F | OXYGEN SATURATION: 95 % | WEIGHT: 258 LBS | RESPIRATION RATE: 20 BRPM | BODY MASS INDEX: 44 KG/M2 | SYSTOLIC BLOOD PRESSURE: 156 MMHG | DIASTOLIC BLOOD PRESSURE: 72 MMHG | HEART RATE: 99 BPM

## 2024-10-22 DIAGNOSIS — D47.2 MGUS (MONOCLONAL GAMMOPATHY OF UNKNOWN SIGNIFICANCE): ICD-10-CM

## 2024-10-22 DIAGNOSIS — D50.0 IRON DEFICIENCY ANEMIA DUE TO CHRONIC BLOOD LOSS: ICD-10-CM

## 2024-10-22 LAB
ALBUMIN SERPL-MCNC: 4.8 G/DL (ref 3.2–4.8)
ALBUMIN/GLOB SERPL: 2 {RATIO} (ref 1–2)
ALP LIVER SERPL-CCNC: 94 U/L
ALT SERPL-CCNC: 26 U/L
ANION GAP SERPL CALC-SCNC: 9 MMOL/L (ref 0–18)
AST SERPL-CCNC: 32 U/L (ref ?–34)
BASOPHILS # BLD AUTO: 0.05 X10(3) UL (ref 0–0.2)
BASOPHILS NFR BLD AUTO: 0.7 %
BILIRUB SERPL-MCNC: 0.5 MG/DL (ref 0.2–1.1)
BUN BLD-MCNC: 16 MG/DL (ref 9–23)
CALCIUM BLD-MCNC: 9.5 MG/DL (ref 8.7–10.4)
CHLORIDE SERPL-SCNC: 101 MMOL/L (ref 98–112)
CO2 SERPL-SCNC: 26 MMOL/L (ref 21–32)
CREAT BLD-MCNC: 1.11 MG/DL
DEPRECATED HBV CORE AB SER IA-ACNC: 19 NG/ML
EGFRCR SERPLBLD CKD-EPI 2021: 54 ML/MIN/1.73M2 (ref 60–?)
EOSINOPHIL # BLD AUTO: 0.11 X10(3) UL (ref 0–0.7)
EOSINOPHIL NFR BLD AUTO: 1.6 %
ERYTHROCYTE [DISTWIDTH] IN BLOOD BY AUTOMATED COUNT: 13.9 %
GLOBULIN PLAS-MCNC: 2.4 G/DL (ref 2–3.5)
GLUCOSE BLD-MCNC: 134 MG/DL (ref 70–99)
HCT VFR BLD AUTO: 39 %
HGB BLD-MCNC: 13.1 G/DL
IMM GRANULOCYTES # BLD AUTO: 0.01 X10(3) UL (ref 0–1)
IMM GRANULOCYTES NFR BLD: 0.1 %
IRON SATN MFR SERPL: 18 %
IRON SERPL-MCNC: 79 UG/DL
LYMPHOCYTES # BLD AUTO: 1.03 X10(3) UL (ref 1–4)
LYMPHOCYTES NFR BLD AUTO: 14.6 %
MCH RBC QN AUTO: 28.1 PG (ref 26–34)
MCHC RBC AUTO-ENTMCNC: 33.6 G/DL (ref 31–37)
MCV RBC AUTO: 83.5 FL
MONOCYTES # BLD AUTO: 0.77 X10(3) UL (ref 0.1–1)
MONOCYTES NFR BLD AUTO: 10.9 %
NEUTROPHILS # BLD AUTO: 5.07 X10 (3) UL (ref 1.5–7.7)
NEUTROPHILS # BLD AUTO: 5.07 X10(3) UL (ref 1.5–7.7)
NEUTROPHILS NFR BLD AUTO: 72.1 %
OSMOLALITY SERPL CALC.SUM OF ELEC: 285 MOSM/KG (ref 275–295)
PLATELET # BLD AUTO: 212 10(3)UL (ref 150–450)
POTASSIUM SERPL-SCNC: 3.5 MMOL/L (ref 3.5–5.1)
PROT SERPL-MCNC: 7.2 G/DL (ref 5.7–8.2)
RBC # BLD AUTO: 4.67 X10(6)UL
SODIUM SERPL-SCNC: 136 MMOL/L (ref 136–145)
TOTAL IRON BINDING CAPACITY: 429 UG/DL (ref 250–425)
TRANSFERRIN SERPL-MCNC: 370 MG/DL (ref 250–380)
WBC # BLD AUTO: 7 X10(3) UL (ref 4–11)

## 2024-10-22 PROCEDURE — 99215 OFFICE O/P EST HI 40 MIN: CPT | Performed by: INTERNAL MEDICINE

## 2024-10-22 PROCEDURE — G2211 COMPLEX E/M VISIT ADD ON: HCPCS | Performed by: INTERNAL MEDICINE

## 2024-10-22 RX ORDER — POTASSIUM CHLORIDE 1500 MG/1
40 TABLET, EXTENDED RELEASE ORAL 2 TIMES DAILY
COMMUNITY
Start: 2024-10-18

## 2024-10-22 RX ORDER — TORSEMIDE 20 MG/1
20 TABLET ORAL 2 TIMES DAILY
COMMUNITY
Start: 2024-10-17

## 2024-10-22 RX ORDER — SPIRONOLACTONE 25 MG/1
25 TABLET ORAL 2 TIMES DAILY
COMMUNITY
Start: 2024-08-05

## 2024-10-22 NOTE — PROGRESS NOTES
Patient here for follow-up. States energy levels are poor. Working closely with cardiology for rapid weight gain and edema.

## 2024-10-22 NOTE — PROGRESS NOTES
[facility] Note    Diagnosis:   IgG Lambda MGUS  Microcytic anemia 2/2 GI Bleed, Gastric bypass    Visit Diagnosis:  1. Iron deficiency anemia due to chronic blood loss    2. MGUS (monoclonal gammopathy of unknown significance)        History of Present Illness: 68F with a PMH of DM2, HTN, A fiib on Eliquis and Gastric bypass was referred by Dr. Thomas for anemia. She has a history of MAYE 2/2 gastric bypass. She also has a history of IgG Lambda MGUS on observation.     Anemia History   She presented to the ER on 10/4/21 with anemia which was noted on pre-op labs. She has a known history of an anastomotic ulcer from 2019. She had been taking 6-8 naproxen daily for back pain. CBC from 02/2019 showed a HB of 12.4 and MCV of 81. A follow up CBC from 10/1/19 showed a HB of 10.5 and MCV of 77. CBC from 9/30/21 showed: HB 6.5, MCV 64. She received 2 units of blood and 200 mg of Venofer. SPEP showed a normal K/L ratio, small IgG Lambda M spike of 0.10. CA 8.4, Cr 1.06. She had an EGD done on 10/8/21 but results are not available. She states that the EGD showed an inflamed/angry ulcer. She has chronic back pain. No fevers, chills, night sweats or weight loss.   IV InFED 10/27/21: Ferritin 8 to 52, HB 9.3 to 11.5 to 14.9 (2/2/22)  IV InFED in 12/2023: Ferritin 23, Fe sat 13    IgG Lambda MGUS History   09/2021: Small IgG Lambda M spike (0.10), normal SFLC ratio. Urine negative. PET/CT negative.   Bone marrow 11/2021: 2% plasma cells   08/23/22: Faint IgM M spike, [place] LC 3.06, Lambda LC 2.36, ratio 1.29, urine bence starkey negative   03/2023: No M spike. [place] LC 2.687, Lambda 2.03, Ratio 1.32    Interval History:   -Feeling more fatigued. Denied any new melena or hematochezia   -Following with pulmonary and cardiology for chronic dyspnea    Review of Systems: 12 Point ROS was completed and pertinent positives are in the HPI    Current Outpatient Medications on File Prior to Visit   Medication Sig  Dispense Refill    NORTRIPTYLINE 50 MG Oral Cap TAKE 1 CAPSULE(50 MG) BY MOUTH EVERY NIGHT 30 capsule 0    pantoprazole 40 MG Oral Tab EC TAKE 1 TABLET(40 MG) BY MOUTH 30 MINUTES BEFORE BREAKFAST AND DINNER 180 tablet 3    oxyBUTYnin Chloride ER 15 MG Oral Tablet 24 Hr TAKE 1 TABLET(15 MG) BY MOUTH DAILY 90 tablet 0    dicyclomine 20 MG Oral Tab Take 1 tablet (20 mg total) by mouth daily as needed. 90 tablet 11    buPROPion  MG Oral Tablet 24 Hr Take 1 tablet (150 mg total) by mouth every morning.      Glucose Blood (ONETOUCH VERIO) In Vitro Strip       apixaban 5 MG Oral Tab Take 1 tablet (5 mg total) by mouth 2 (two) times daily. 60 tablet 0    traMADol 50 MG Oral Tab Take 1 tablet (50 mg total) by mouth every 6 (six) hours. (Patient not taking: Reported on 5/23/2024) 40 tablet 0    benzonatate 200 MG Oral Cap Take 1 capsule (200 mg total) by mouth 3 (three) times daily. (Patient not taking: Reported on 5/23/2024)      azelastine 0.1 % Nasal Solution 2 sprays by Nasal route 2 (two) times daily. 30 mL 3    pregabalin (LYRICA) 100 MG Oral Cap Week 1-2: take 100mg twice a day, Week 3: if foot pain not improved, increase to 100mg 3 times a day (Patient taking differently: Take 1 capsule (100 mg total) by mouth in the morning, at noon, and at bedtime. Week 1-2: take 100mg twice a day, Week 3: if foot pain not improved, increase to 100mg 3 times a day) 90 capsule 2    sertraline 50 MG Oral Tab Take 1 tablet (50 mg total) by mouth daily.      rosuvastatin 20 MG Oral Tab Take 1 tablet (20 mg total) by mouth nightly. (Patient not taking: Reported on 5/23/2024)      sucralfate 1 g Oral Tab Take 1 tablet (1 g total) by mouth daily.      Multiple Vitamins-Minerals (MULTIVITAMIN ADULTS 50+ OR) 2 gummies daily      metFORMIN 500 MG Oral Tab Take 1 tablet (500 mg total) by mouth 2 (two) times daily with meals. Pre-diabetic      Levothyroxine Sodium 75 MCG Oral Tab Take 1 tablet (75 mcg total) by mouth before breakfast.       ergocalciferol 94572 units Oral Cap Take 1 capsule (50,000 Units total) by mouth every 30 (thirty) days.       No current facility-administered medications on file prior to visit.     Past Medical History:    Abdominal distention    Abdominal pain    Acute, but ill-defined, cerebrovascular disease    Anxiety    Arthritis    Back pain    Back problem    lower    Bloating    Blood in urine    Bowel obstruction ([facility])    Calculus of kidney    Cataract    DDD (degenerative disc disease), lumbar    Depression    Diabetes (HCC)    Diarrhea, unspecified    Disorder of thyroid    hypothyroid    Easy bruising    Flatulence/gas pain/belching    Frequent urination    Frequent UTI    Headache disorder    High blood pressure    High cholesterol    History of blood transfusion    2 units PRBC    History of cardiac murmur    History of peripheral neuropathy    History of stomach ulcers    Leaking of urine    Lumbosacral radiculopathy    Microcytic anemia    Migraines    Morbid obesity with BMI of 40.0-44.9, adult ([facility])    Murmur    Nausea    Neuropathy    both feet    PAULA (obstructive sleep apnea)    AHI 20.2 REM AHI 70.5 Supine AHI 15.8 Lateral AHI 34.8    Osteoarthritis    Pain in joints    PONV (postoperative nausea and vomiting)    Prediabetes    S/P lumbar fusion    Sleep apnea    bipap    Stress    Stroke ([facility])    mini stroke    Thyroid activity decreased    Uncomfortable fullness after meals    Visual impairment    glasses    Wears glasses     Past Surgical History:   Procedure Laterality Date    Arthroscopy of joint unlisted Right 1999    knee x 2    Arthroscopy of joint unlisted Left 2003    knee x 2    Back surgery      LUMBAR SURGERY/HARDWARE/RODS    Bowel resection        1980,1983,1984,    Carpal tunnel release Bilateral , , -    bilateral x 2     Cataract Bilateral     Cholecystectomy  10/1980    open    Colectomy  2004 & 2004    Colonoscopy N/A 08/10/2017    Procedure:  COLONOSCOPY;  Surgeon: Tahir Vu MD;  Location:  ENDOSCOPY    Colonoscopy      Colonoscopy N/A 2023    Procedure: COLONOSCOPY;  Surgeon: Edwin Martin MD;  Location:  ENDOSCOPY    Cystoscopy,remv calculus,simple  2002    kidney stone removed    Dilation/curettage,diagnostic  1997, 2002    Egd      Foot surgery  2003    Rt. foot fx. repair    Foot surgery  2002    neuroma left foot    Gastric bypass,obese<100cm cathy-en-y  10/2000    Gastric bypass,obesity,sb reconstruc      Hernia surgery  2003    ventral & abdominoplasty    Knee replacement surgery      Other  2021    EXPLORATION OF HARDWARE AT LUMBAR 4/ LUMBAR 5, EXPLORATION OF FUSION, ARTHRODESIS OF LUMBAR 4/LUMBAR 5, ALLOGRAFT    Other surgical history  2009    removal cement piece right knee    Other surgical history  10/2004    extraction of all her teeth    Other surgical history  2003 & 2013    open abdominal wound repair    Part removal colon w end colostomy      Spine surgery procedure unlisted      Total knee replacement Left 10/2006 & 2010    Total knee replacement Right     x3    Tubal ligation  1984    Upper gi endoscopy,exam  2006 & 2007     Social History     Socioeconomic History    Marital status: Single   Tobacco Use    Smoking status: Former     Current packs/day: 0.00     Average packs/day: 0.1 packs/day for 15.0 years (1.5 ttl pk-yrs)     Types: Cigarettes     Start date: 1972     Quit date: 1984     Years since quittin.4    Smokeless tobacco: Never   Vaping Use    Vaping status: Never Used   Substance and Sexual Activity    Alcohol use: Never    Drug use: Never      Family History   Problem Relation Age of Onset    Hypertension Father     Heart Disorder Father     Hypertension Mother     Diabetes Mother     Heart Disorder Mother     Other (Other) Mother     Diabetes Sister     Cancer Sister     Cancer Brother     Colon Cancer Maternal Aunt     Diabetes Maternal Aunt      Colon Cancer Maternal Uncle     Diabetes Maternal Uncle     Cancer Brother     Diabetes Sister     Diabetes Brother        Physical Exam  Height: --  Weight: 117 kg (258 lb) (10/22 0945)  BSA (Calculated - sq m): --  Pulse: 99 (10/22 0945)  BP: 156/72 (10/22 0945)  Temp: 97.5 °F (36.4 °C) (10/22 0945)  Do Not Use - Resp Rate: --  SpO2: 95 % (10/22 0945)     General: NAD, AOX3  HEENT: clear op, mmm, no jvd, no scleral icterus  CV: RRR S1S2   Lungs: CTAB. RRR S1S2  Extremities: No edema   Abd: soft nt nd +BS  Neuro: CN: II-XII grossly intact      Results:  Lab Results   Component Value Date    WBC 7.0 10/22/2024    HGB 13.1 10/22/2024    HCT 39.0 10/22/2024    MCV 83.5 10/22/2024    .0 10/22/2024     Lab Results   Component Value Date     05/23/2024    K 4.0 05/23/2024    CO2 29.0 05/23/2024     05/23/2024    BUN 25 (H) 05/23/2024    ALB 3.6 05/23/2024    ALB 3.94 05/23/2024       Lab Results   Component Value Date     03/09/2023       Radiology: reviewed     Pathology: reviewed   11/3/21  The bone marrow cellularity is overall mildly increased for the patient's stated age.  There is active trilineage hematopoiesis.  There are no increase in blasts.  There are no significant dysplastic changes.  Plasma cells comprise 2% of all nucleated cells. There are no abnormal lymphoid aggregates.  Iron stores  are present.  No ring sideroblasts are seen.     Immunohistochemistry is performed to evaluate the marrow elements.  There is a mixture of CD3 positive T lymphocytes and CD20 positive B lymphocytes with the T lymphocytes predominating.   highlights the plasma cells. There are scattered blasts as highlighted by CD34.     Correlation with cytogenetic studies is recommended.    Assessment and Plan:  68F with a PMH of DM2, HTN and Gastric bypass was referred by Dr. Thomas for anemia.     Iron Deficiency anemia   -2/2 Gastric bypass, UGIB from anastomotic ulcer and angiodysplasia. Following  with Dr. Martin  -S/P IV InFED  -Normal B12/Folic acid  -Repeat CBC and Fe studies. CBC normal. Fe studies pending   -Discussed that she may need intermittent IV Iron given poor PO absorption due to bypass    IgG Lambda Monoclonal protein: noted 09/30/21  -Likely MGUS since anemia is likely due to iron deficiency.   -24 hour urine negative  -Bone marrow from 11/18/21 with 2% plasma cells   -PET/CT from 11/8/21 normal aside from non-specific uptake on R anterior 7th rib. Follow up MRI negative.   - repeat SPEP from 05/2024 did not show M spike. Will repeat in 05/2025    CBC, FE studies in 3 months-ordered as standing    [facility]

## 2024-10-31 ENCOUNTER — OFFICE VISIT (OUTPATIENT)
Dept: HEMATOLOGY/ONCOLOGY | Facility: HOSPITAL | Age: 69
End: 2024-10-31
Attending: INTERNAL MEDICINE
Payer: MEDICARE

## 2024-10-31 VITALS
RESPIRATION RATE: 18 BRPM | HEART RATE: 92 BPM | DIASTOLIC BLOOD PRESSURE: 39 MMHG | TEMPERATURE: 97 F | OXYGEN SATURATION: 93 % | SYSTOLIC BLOOD PRESSURE: 121 MMHG

## 2024-10-31 DIAGNOSIS — D50.0 IRON DEFICIENCY ANEMIA DUE TO CHRONIC BLOOD LOSS: Primary | ICD-10-CM

## 2024-10-31 PROCEDURE — 96365 THER/PROPH/DIAG IV INF INIT: CPT

## 2024-10-31 NOTE — PROGRESS NOTES
Education Record    Learner:  Patient    Disease / Diagnosis:iron deficiency    Barriers / Limitations:  None   Comments:    Method:  Brief focused   Comments:    General Topics:  Plan of care reviewed   Comments:    Outcome:  Shows understanding   Comments:    Infed given without complication. Pt discharged in stable condition.  Pt to get labs done in 3 months.

## 2024-11-18 ENCOUNTER — GENETICS ENCOUNTER (OUTPATIENT)
Dept: GENETICS | Facility: HOSPITAL | Age: 69
End: 2024-11-18
Attending: INTERNAL MEDICINE
Payer: MEDICARE

## 2024-11-18 ENCOUNTER — NURSE ONLY (OUTPATIENT)
Dept: HEMATOLOGY/ONCOLOGY | Facility: HOSPITAL | Age: 69
End: 2024-11-18
Attending: INTERNAL MEDICINE
Payer: MEDICARE

## 2024-11-18 DIAGNOSIS — Z80.3 FAMILY HISTORY OF BREAST CANCER: ICD-10-CM

## 2024-11-18 DIAGNOSIS — Z80.0 FAMILY HISTORY OF COLON CANCER: Primary | ICD-10-CM

## 2024-11-18 DIAGNOSIS — Z80.49 FAMILY HISTORY OF MALIGNANT NEOPLASM OF UTERUS: ICD-10-CM

## 2024-11-18 DIAGNOSIS — Z80.9 FAMILY HISTORY OF CANCER: ICD-10-CM

## 2024-11-18 PROCEDURE — 36415 COLL VENOUS BLD VENIPUNCTURE: CPT

## 2024-11-18 PROCEDURE — 96040 HC GENETIC COUNSELING EA 30 MIN: CPT | Performed by: GENETIC COUNSELOR, MS

## 2024-11-18 NOTE — PROGRESS NOTES
Patient Name: Alie Arceo  YOB: 1955  Date of Visit: 2024    Reason for visit: Ms. Arceo was seen for the purposes of genetic counseling due to a maternal family history of various cancers and no paternal family history information. The purpose of this visit was to review information regarding genetic testing options for mutations in high penetrance cancer susceptibility genes.    Referring Provider: Donis Varghese MD  Additional Provider(s): Jeaneth Knott MD; Edwin Martin MD    Medical History: Ms. Arceo is a pleasant 68 year old female presenting with no personal history of cancer. She retains her uterus, ovaries, and fallopian tubes. She has had a cholecystectomy.    She has a h/o MGUS and iron deficiency anemia for which she sees Dr. Varghese with Hem/Onc.    Ms. Arceo's last breast imaging was in 2024 resulting a benign right breast biopsy with fat necrosis (birads A). She has had a colonoscopy and EGD.     Ms. Arceo achieved menarche at approximately 10 years of age, is post-menopausal ~mid-40s-50y, and has been pregnant 4 times, delivering her first of 3 children at 20y. Ms. Arceo has a <5-year history of oral contraceptive use and denies any fertility or hormone replacement use.      Relevant Family History: Ms. Arceo has 2 daughters (48y, 41y) and 1 son (40y). Her oldest child is a maternal half-sibling to the youngest two. The oldest daughter was recently diagnosed with breast cancer ~2 months ago an is receiving alex-adjuvant chemo currently, she may have had genetic testing drawn with results still pending.     Ms. Arceo has 2 maternal half-sisters and 3 maternal half-brothers (1  ~3 months dt congenital defect of the GI tract). 1 half-sister was dx with endometrial cancer in her late-50s s/p surgery, chemo, RT. Both half-brothers were dx with throat cancer in their 50-60s.     Ms. Arceo has no information about her biological father or paternal side of the family.      Ms. Arceo's mother  at 83y dt dementia related decline with a h/o cervical cancer dx in her late-20s.     There are 2 full maternal aunts, 3 full maternal uncles, and 2 maternal half-uncles (related via [facility]). One maternal aunt was dx with breast cancer in her 70s and the other maternal aunt  due to colon cancer in her 60s. One full uncle  dt colon cancer in his 60s and another full uncle  dt a brain tumor <50y. One of the maternal half-uncles  with a h/o bone cancer d >50y. No maternal 1st cousins are reported to have cancer.     Ms. Arceo's maternal grandmother  >50y dt bone cancer. The maternal grandfather  <50y dt MI.     The rest of the family history is negative for other significant genetic conditions, cancers, or birth defects of any kind. See scanned pedigree for full family history reported during the session.    Ms. Arceo's maternal ethnicity is [place] and her paternal ethnicity is Greek. She is not of any Ashkenazi Taoism heritage.     Summary: The majority of cancers are sporadic whereas approximately 10-30% of cases of cancer cases are attributed to familial factors such as unidentified low penetrance genes in the family or shared environmental factors.  Approximately 5-10% of cancers are related to a hereditary cancer syndrome.  Signs of a hereditary cancer syndrome include some rare cancers, common cancers occurring at unusually young ages, multiple primary cancers in the same individual, multiple colorectal polyps, or the same type of cancer or related cancers (e.g., breast and ovarian, colorectal and endometrial) in three or more individuals in the same lineage.     Cancer is usually caused by gene mutations that occur randomly in one or a few cells of the body. Such gene changes, called somatic mutations, may arise as a natural consequence of aging or when a cell’s DNA has been damaged. Acquired mutations are only present in some of the body’s cells, and they are  not passed on from parents to their children.    However, in the small percentage of people with a hereditary cancer syndrome, the disease is due to a different type of mutation called a hereditary mutation, or germline mutation. These mutations are usually inherited from one or both of the person’s parents, and are present in nearly every cell of the body. Because hereditary mutations are present in the DNA of sperm and egg cells, they can be passed down in families. People who carry such hereditary mutations do not necessarily get cancer, but their risk of developing the disease at some point during their lifetime is higher than average. When a personal and/or family history doesn't clearly fit a single hereditary cancer syndrome, panel genetic testing examining multiple genes in which pathogenic mutations cause hereditary cancer syndromes is appropriate.     If testing is performed, three results are possible: positive, negative, and variant of uncertain significance.  A positive result indicates a pathogenic variant (harmful gene mutation) has been identified, and there is an increased risk for the cancers associated with the specific gene.  Since pathogenic variants in most cancer susceptibility genes are inherited in an autosomal dominant fashion, siblings and children of individuals with a pathogenic variant have a 50% risk of carrying the same familial pathogenic variant as well.      A negative test result would indicate that no pathogenic variant was identified in a cancer susceptibility gene.  While testing detects gene mutations, it is possible for a genetic variant to be present and go undetected.  It is also possible for a genetic variant to be located in a gene other than those being tested.     A variant of uncertain significance means that a change has been identified a cancer susceptibility gene; however, it is uncertain if the variant is pathogenic or a non-deleterious (benign) change.  With time,  the variant may be reclassified as either pathogenic or benign.    Since pathogenic variant identification is necessary, an affected family member is preferred in order to confirm that a pathogenic variant is indeed present in a particular family.  If the pathogenic variant can be identified in an affected individual, this information can be used to screen other at-risk individuals in the family.  Individuals who are positive for the same pathogenic variant would be expected to have a much greater risk for developing hereditary cancer during their lifetime than the general population and surveillance and management would be rigorous.  For those individuals who are found to not carry the pathogenic variant, risks for developing cancer during their lifetime would return to those expected for individuals in the general population.  It should be emphasized that absence of a pathogenic variant in an at-risk individual would not eliminate their risk for cancer, but simply return them to the risk expected for the general population. If no affected individual is available for testing, a negative result, while reassuring, cannot be completely informative of the familial cancer risk as it is unknown whether no pathogenic variant was found because the individual tested is truly negative for the familial pathogenic variant or whether the familial pathogenic variant was unable to be detected by the genetic testing method used. In such cases, screening and follow-up should be guided by personal and family history.     It should be noted that no one under age 18y is recommended to have testing performed for mutations in high-penetrance cancer predisposition genes for adult-onset conditions. Ms. Mock genetic information is protected under the Genetic Information Nondiscrimination Act of 2008 (LANE). LANE is a federal law protecting individuals from genetic discrimination in health insurance and most places of employment. LANE  protections against genetic discrimination do not apply to other forms of insurance such as life, long term care, disability, and  insurance. Individuals without such policies in place may wish to consider doing so before proceeding with genetic testing, since their genetic information could potentially be used to inform decisions concerning eligibility, premiums, and coverage.    Because Ms. Arceo has a family history of breast cancer in her daughter (dx at 48y) and a maternal aunt (dx in her 70s), colon cancer in her maternal uncle and maternal aunt (dx at 60y or older), brain cancer in a maternal uncle, and endometrial cancer in her sister (dx late-50s) genetic testing for pathogenic variants in high-penetrance cancer susceptibility genes is indicated based on the NCCN guidelines (BOP V.1.2025; Aguero syndrome V.3.2024).      Ms. Arceo appeared to understand the information presented. On the day of the visit Ms. Arceo elected to proceed with genetic testing for hereditary cancer syndromes. My office will call Ms. Arceo as soon as results are received; post-test counseling can be scheduled at that time. Thank you for allowing me to participate in the care of your patient; please do not hesitate to contact my office if you have any questions or concerns, [phone].    Plan:   Blood was drawn and sent out for [employer] multi-cancer + RNA panel (TAT: ~2 weeks; 70 genes).    The Genetics office will call Ms. Arceo when results are available.  Recommendations for Ms. Arceo and family members will depend the above genetic testing results.      Send to: Abimael/Hedy/Veronica  Time spent with patient: 40 minutes      Mallika Ji MS, [facility]

## 2024-11-26 ENCOUNTER — GENETICS ENCOUNTER (OUTPATIENT)
Dept: HEMATOLOGY/ONCOLOGY | Facility: HOSPITAL | Age: 69
End: 2024-11-26

## 2024-11-26 NOTE — PROGRESS NOTES
Patient Name: Alie Arceo  YOB: 1955    Referring Provider: Donis Varghese MD  Additional Provider(s): Jeaneth Knott MD; Edwin Martin MD     Reason for Referral:  Ms. Arceo had genetic testing performed on 11/18/2024 because of a maternal family history of various cancers and no known paternal family history information.      Genetic Testing Result:  Negative. No pathogenic variant was found in the following 70 genes on the [employer] multi-cancer + RNA panel: AIP, ALK, APC, ASPEN, AXIN2, BAP1, BARD1, BLM, BMPR1A, BRCA1, BRCA2, BRIP1, CDC73, CDH1, CDK4, CDKN1B, CDKN2A, CHEK2, CTNNA1, DICER1, EGFR, EPCAM, FH, FLCN, GREM1, HOXB13, KIT, LZTR1, MAX, MBD4, MEN1, MET, MITF, MLH1, MSH2, MSH3, MSH6, MUTYH, NF1, NF2, NTHL1, PALB2, PDGFRA, PMS2, POLD1, POLE, POT1, JREJB8O, PTCH1, PTEN, RAD51C, RAD51D, RB1, RET, SDHA, SDHAF2, SDHB, SDHC, SDHD, SMAD4, SMARCA4, SMARCB1, SMARCE1, STK11, SUFU, KSUY646, TP53, TSC1, TSC2, VHL. Please refer to the report from [employer] for additional testing information. These results were discussed in a voicemail message left at Ms. Arceo's phone number: [phone] on 11/26/2024, and a copy of the test results was mailed to Ms. Arceo's permanent address.    Summary and Plan:   These results indicate that Ms. Arceo was not found to have a pathogenic variant (harmful genetic mutation) in any of the genes listed above. No pathogenic variants associated with hereditary cancer syndromes were identified. The etiology Ms. Arceo's family history of cancer remains genetically unexplained. The limitations of the testing were discussed with Ms. Arceo including the chance that a pathogenic variant in a gene other than those included in this analysis might be the cause of cancer in Ms. Arceo or in relatives.     Medical management and surveillance for Ms. Arceo and other family members should be based on their personal and family history. All medical management decisions should be made with  a physician.     I encouraged Ms. Arceo to share the genetic test results with her children and other relatives so that they may discuss the implications of this information with their health providers. Ms. Arceo is also encouraged to contact me on an annual basis to learn if there have been any updates in genetic information that would apply or if there are changes in the personal and/or family history. Please do not hesitate to contact my office if you have any questions or concerns, [phone].     Mallika Ji MS, CGC

## 2025-01-03 PROBLEM — M65.349 TRIGGER RING FINGER: Status: ACTIVE | Noted: 2024-05-29

## 2025-01-20 DIAGNOSIS — G43.009 MIGRAINE WITHOUT AURA AND WITHOUT STATUS MIGRAINOSUS, NOT INTRACTABLE: ICD-10-CM

## 2025-01-20 RX ORDER — NORTRIPTYLINE HYDROCHLORIDE 50 MG/1
50 CAPSULE ORAL NIGHTLY
Qty: 30 CAPSULE | Refills: 0 | OUTPATIENT
Start: 2025-01-20

## 2025-01-21 NOTE — TELEPHONE ENCOUNTER
Sent the patient a [employer] message to make an appointment for further medication.       Medication: NORTRIPTYLINE 50 MG Oral Cap      Date of last refill: 08/20/2024 (#30/0)  [place]     Last office visit: 07/12/2023  Due back to clinic per last office note:  Around 11/12/2023  Date next office visit scheduled:    No future appointments.        Last OV note recommendation:    Impression/Plan:  Common peroneal neuropathy of right lower extremity  (primary encounter diagnosis)  Polyneuropathy  Lumbosacral radiculopathy  Migraine without aura and without status migrainosus, not intractable  Basal ganglia stroke (hcc)     Right common peroneal neuropathy- likely stretch injury from R knee surgery, or compression from edema around knee. Elevate leg as much as possible, avoid crossing legs. Start PT  Obtain EMG     Polyneuropathy- Has history of B12 deficiency, and stopped injections a few years ago. 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 DM continues to be well controlled. Neuropathic pain- gabapentin not effective, Lyrica not effective either at 100mg TID.   Trial 50mg higher dose of nortriptyline (did 20mg prior to surgery, then stopped, was not effective).     Migraines- start higher dose nortriptyline. Educated on prevention of MOH and best way to take abortive medication. Start fiorcet, and prophylactic nortriptyline, for dual neuropathy and migraine prophylaxis.      Lumbar radiculopathy, s/p surgery- continue PT exercises     BG stroke- subsequently found to have afib, continue AC (on eliquis) and statin

## 2025-07-14 NOTE — ANESTHESIA PROCEDURE NOTES
Airway  Date/Time: 6/5/2023 12:09 PM  Urgency: elective    Airway not difficult    General Information and Staff    Patient location during procedure: OR  Anesthesiologist: Haydee Joshua MD  Performed: anesthesiologist   Performed by: Haydee Joshua MD  Authorized by: Haydee Joshua MD      Indications and Patient Condition  Indications for airway management: anesthesia  Sedation level: deep  Preoxygenated: yes  Patient position: sniffing  Mask difficulty assessment: 1 - vent by mask    Final Airway Details  Final airway type: endotracheal airway      Successful airway: ETT  Cuffed: yes   Successful intubation technique: direct laryngoscopy  Endotracheal tube insertion site: oral  Blade: Stanley  Blade size: #3  ETT size (mm): 7.5    Cormack-Lehane Classification: grade I - full view of glottis  Placement verified by: chest auscultation and capnometry   Measured from: teeth  ETT to teeth (cm): 20  Number of attempts at approach: 1
Regional Block    Date/Time: 6/5/2023 12:10 PM    Performed by: Sammy Flores MD  Authorized by: Sammy Flores MD      General Information and Staff    Start Time:  6/5/2023 12:10 PM  End Time:  6/5/2023 12:13 PM  Anesthesiologist:  Sammy Flores MD  Performed by: Anesthesiologist  Patient Location:  OR    Block Placement: Post Induction  Site Identification: real time ultrasound guided and image stored and retrievable    Block site/laterality marked before start: site marked  Reason for Block: at surgeon's request and post-op pain management    Preanesthetic Checklist: 2 patient identifers, IV checked, site marked, risks and benefits discussed, monitors and equipment checked, pre-op evaluation, timeout performed, anesthesia consent, sterile technique used, no prohibitive neurological deficits and no local skin infection at insertion site      Procedure Details    Patient Position:  Supine  Prep: ChloraPrep    Monitoring:  Cardiac monitor, continuous pulse ox and blood pressure cuff  Block Type: Adductor canal  Laterality:  Right  Injection Technique:  Single-shot    Needle    Needle Type:  Short-bevel and echogenic  Needle Gauge:  21 G  Needle Localization:  Ultrasound guidance  Reason for Ultrasound Use: appropriate spread of the medication was noted in real time and no ultrasound evidence of intravascular and/or intraneural injection            Assessment    Injection Assessment:  Good spread noted, negative resistance, negative aspiration for heme, incremental injection and low pressure  Heart Rate Change: No    - Patient tolerated block procedure well without evidence of immediate block related complications.      Medications  6/5/2023 12:10 PM      Additional Comments    Medication:  Ropivacaine 0.375% 20mL
no

## (undated) DEVICE — NEEDLE SPINAL 22X5 405148

## (undated) DEVICE — SUTURE VCRL SZ 2-0 L27IN ABSRB UD L36MM CP-1

## (undated) DEVICE — 3M™ RED DOT™ MONITORING ELECTRODE WITH FOAM TAPE AND STICKY GEL, 50/BAG, 20/CASE, 72/PLT 2570: Brand: RED DOT™

## (undated) DEVICE — ENDOSCOPY PACK - LOWER: Brand: MEDLINE INDUSTRIES, INC.

## (undated) DEVICE — Device: Brand: INTELLICART™

## (undated) DEVICE — 3.0MM PRECISION NEURO (MATCH HEAD)

## (undated) DEVICE — REM POLYHESIVE ADULT PATIENT RETURN ELECTRODE: Brand: VALLEYLAB

## (undated) DEVICE — 1200CC GUARDIAN II: Brand: GUARDIAN

## (undated) DEVICE — ENDOSCOPY PACK UPPER: Brand: MEDLINE INDUSTRIES, INC.

## (undated) DEVICE — STERILE HOOK LOCK LATEX FREE ELASTIC BANDAGE 6INX5YD: Brand: HOOK LOCK™

## (undated) DEVICE — LAMINECTOMY CDS: Brand: MEDLINE INDUSTRIES, INC.

## (undated) DEVICE — GAUZE SPONGES,12 PLY: Brand: CURITY

## (undated) DEVICE — GLOVE SURG SENSICARE SZ 7-1/2

## (undated) DEVICE — PADDING CAST W4INXL4YD 100% COT SFT SELF BOND

## (undated) DEVICE — DRAPE,U/SHT,SPLIT,FILM,60X84,STERILE: Brand: MEDLINE

## (undated) DEVICE — FILTERLINE NASAL ADULT O2/CO2

## (undated) DEVICE — REMOVER DURAPREP 3M

## (undated) DEVICE — SUTURE VICRYL 0 CT-1

## (undated) DEVICE — SNARE CAPTIFLEX STD OVAL OLY

## (undated) DEVICE — SOLUTION IRRIG 3000ML 0.9% NACL FLX CONT

## (undated) DEVICE — Device

## (undated) DEVICE — C-ARM: Brand: UNBRANDED

## (undated) DEVICE — BANDAID COVERLET 1X3

## (undated) DEVICE — 3M™ IOBAN™ 2 ANTIMICROBIAL INCISE DRAPE 6651EZ: Brand: IOBAN™ 2

## (undated) DEVICE — ATTAHJA VAC WITH 22MM CONNECTR

## (undated) DEVICE — DRILL SRG OIL CRTDG MAESTRO

## (undated) DEVICE — MEDI-VAC NON-CONDUCTIVE SUCTION TUBING: Brand: CARDINAL HEALTH

## (undated) DEVICE — HOOD: Brand: FLYTE

## (undated) DEVICE — COVER,TABLE,HVY DUTY,EXT,77"X96",STRL: Brand: MEDLINE

## (undated) DEVICE — SCD SLEEVE KNEE HI BLEND

## (undated) DEVICE — SLEEVE COMPR M KNEE LEN SGL USE KENDALL SCD

## (undated) DEVICE — HOOD, PEEL-AWAY: Brand: FLYTE

## (undated) DEVICE — Device: Brand: STABLECUT®

## (undated) DEVICE — WRAP COOLING KNEE W/ICE PILLOW

## (undated) DEVICE — Device: Brand: DEFENDO AIR/WATER/SUCTION AND BIOPSY VALVE

## (undated) DEVICE — STERILE POLYISOPRENE POWDER-FREE SURGICAL GLOVES WITH EMOLLIENT COATING: Brand: PROTEXIS

## (undated) DEVICE — GLOVE SURG SENSICARE SZ 7

## (undated) DEVICE — NEEDLE SPINAL 22X3-1/2 BLK

## (undated) DEVICE — SUTURE VICRYL 2-0 CT-2

## (undated) DEVICE — SUTURE VICRYL 1 OS-6

## (undated) DEVICE — GOWN SURG AERO CHROME XXL

## (undated) DEVICE — LIGHT HANDLE

## (undated) DEVICE — STERILE POLYISOPRENE POWDER-FREE SURGICAL GLOVES: Brand: PROTEXIS

## (undated) DEVICE — 3M™ IOBAN™ 2 ANTIMICROBIAL INCISE DRAPE 6650EZ: Brand: IOBAN™ 2

## (undated) DEVICE — DRAPE TABLE COVER 44X90 TC-10

## (undated) DEVICE — MEDI-VAC SUCTION HANDLE REGULAR CAPACITY: Brand: CARDINAL HEALTH

## (undated) DEVICE — STERILE SYNTHETIC POLYISOPRENE POWDER-FREE SURGICAL GLOVES WITH HYDROGEL COATING: Brand: PROTEXIS

## (undated) DEVICE — SUTURE VCRL SZ 1 L27IN ABSRB UD L48MM CPX 1/2

## (undated) DEVICE — PROXIMATE SKIN STAPLERS (35 WIDE) CONTAINS 35 STAINLESS STEEL STAPLES (FIXED HEAD): Brand: PROXIMATE

## (undated) DEVICE — SOL  .9 1000ML BTL

## (undated) DEVICE — SUTURE VICRYL 3-0 RB-1

## (undated) DEVICE — FORCEP BIOPSY RJ4 LG CAP W/ND

## (undated) DEVICE — CONTAINER SPEC 4OZ POLYPR GRAD LEAK RESIST

## (undated) DEVICE — UNIVERSAL STERIBUMP® STERILE (5/CASE): Brand: UNIVERSAL STERIBUMP®

## (undated) DEVICE — DRESS WOUND AQUACEL 3.5INX12IN

## (undated) DEVICE — PAIN TRAY: Brand: MEDLINE INDUSTRIES, INC.

## (undated) DEVICE — EXOFIN TISSUE ADHESIVE 1.0ML

## (undated) DEVICE — DISPOSABLE TOURNIQUET CUFF SINGLE BLADDER, DUAL PORT AND QUICK CONNECT CONNECTOR: Brand: COLOR CUFF

## (undated) DEVICE — CLIP LGT 11MM OPEN 2.8MM 235CM

## (undated) DEVICE — ATTUNE PINNING SYSTEM
Type: IMPLANTABLE DEVICE | Site: KNEE
Brand: ATTUNE

## (undated) DEVICE — FIAPC® PROBE W/ FILTER 2200 C OD 2.3MM/6.9FR; L 2.2M/7.2FT: Brand: ERBE

## (undated) DEVICE — SUTURE VICRYL PLUS 3-0 PS-2

## (undated) DEVICE — Device: Brand: PLUMEPEN

## (undated) DEVICE — SYRINGE 10ML LL CONTRL SYRINGE

## (undated) DEVICE — TOTAL KNEE CDS: Brand: MEDLINE INDUSTRIES, INC.

## (undated) DEVICE — PENCIL ES BTTN SWCH W/ TIP HOLSTER E-Z CLN

## (undated) DEVICE — BANDAGE COHESIVE W4INXL5YD TAN E POR SLF ADH

## (undated) DEVICE — STANDARD HYPODERMIC NEEDLE,POLYPROPYLENE HUB: Brand: MONOJECT

## (undated) DEVICE — SYRINGE MED 20ML STD CLR PLAS LL TIP N CTRL

## (undated) DEVICE — PTFE COATED BLADE 4': Brand: MEDLINE

## (undated) DEVICE — STOCKINETTE SUR W12XL48IN STD HLLW IMPERV OTR

## (undated) DEVICE — CATH IV 14G X 5-1/4 ANGIO

## (undated) DEVICE — SUTURE STRATAFIX SYMMTRC PDS + SZ 1 L18IN

## (undated) DEVICE — 450 ML BOTTLE OF 0.05% CHLORHEXIDINE GLUCONATE IN 99.95% STERILE WATER FOR IRRIGATION, USP AND APPLICATOR.: Brand: IRRISEPT ANTIMICROBIAL WOUND LAVAGE

## (undated) DEVICE — TRAP 4 CPTR CHMBR N EZ INLN

## (undated) DEVICE — SOLUTION IRRIG 1000ML 0.9% NACL USP BTL

## (undated) DEVICE — SUTURE ETHBND EXCEL SZ 5 L30IN NONABSORBABLE MB66G

## (undated) DEVICE — DRAPE,LAPAROTOMY,PCH,STERILE: Brand: MEDLINE

## (undated) DEVICE — ALCOHOL 70% 4 OZ

## (undated) NOTE — MR AVS SNAPSHOT
After Visit Summary   10/31/2023    Reta Perez   MRN: [id number]           Visit Information     Date & Time  10/31/2023  1:15 PM Provider  Darrel Gilliland, [address] [place] [address] Neurodiagnostics Dept. Phone  [phone] Were     LMP   05/23/2004             Allergies as of 10/31/2023  Review status set to Review Complete on 6/5/2023       Noted Reaction Type Reactions    Adhesive Tape (rosins) 10/13/2015    OTHER (SEE COMMENTS)    Other reaction(s): Redness/skin breakdown. Ok with paper tape. Your Current Medications        Dosage    dicyclomine 20 MG Oral Tab Take 1 tablet (20 mg total) by mouth 3 (three) times daily as needed. nortriptyline 50 MG Oral Cap TAKE 1 CAPSULE(50 MG) BY MOUTH EVERY NIGHT    HYDROcodone-acetaminophen 5-325 MG Oral Tab Take 1 tablet by mouth every 6 (six) hours as needed. apixaban 5 MG Oral Tab Take 1 tablet (5 mg total) by mouth 2 (two) times daily. traMADol 50 MG Oral Tab Take 1 tablet (50 mg total) by mouth every 6 (six) hours. Naloxone HCl 4 MG/0.1ML Nasal Liquid 4 mg by Nasal route as needed. If patient remains unresponsive, repeat dose in other nostril 2-5 minutes after first dose. pantoprazole 40 MG Oral Tab EC Take one tablet (40 mg total) by mouth 30 minutes prior to breakfast and dinner    butalbital-acetaminophen-caffeine -40 MG Oral Tab Take at onset of migraine, can repeat in 4-6 hours. Do not take more than 2-3 times a week    benzonatate 200 MG Oral Cap Take 1 capsule (200 mg total) by mouth 3 (three) times daily. azelastine 0.1 % Nasal Solution 2 sprays by Nasal route 2 (two) times daily. pregabalin (LYRICA) 100 MG Oral Cap Week 1-2: take 100mg twice a day, Week 3: if foot pain not improved, increase to 100mg 3 times a day    sertraline 50 MG Oral Tab Take 1 tablet (50 mg total) by mouth daily. rosuvastatin 20 MG Oral Tab Take 1 tablet (20 mg total) by mouth nightly.     Oxybutynin Chloride ER 15 MG Oral Tablet 24 Hr Take 1 tablet (15 mg total) by mouth daily. PANTOPRAZOLE 40 MG Oral Tab EC TAKE 1 TABLET BY MOUTH TWICE DAILY BEFORE MEALS    sucralfate 1 g Oral Tab Take 1 tablet (1 g total) by mouth daily. Multiple Vitamins-Minerals (MULTIVITAMIN ADULTS 50+ OR) 2 gummies daily    metFORMIN 500 MG Oral Tab Take 1 tablet (500 mg total) by mouth 2 (two) times daily with meals. Pre-diabetic    Levothyroxine Sodium 75 MCG Oral Tab Take 1 tablet (75 mcg total) by mouth before breakfast.    ergocalciferol 02769 units Oral Cap Take 1 capsule (50,000 Units total) by mouth every 30 (thirty) days. Potassium Chloride ER (K-DUR M20) 20 MEQ Oral Tab CR Take 2 tablets (40 mEq total) by mouth 4 (four) times daily. spironolactone (ALDACTONE) 25 MG Oral Tab Take 1 tablet (25 mg total) by mouth 2 (two) times daily. torsemide (DEMADEX) 20 MG Oral Tab Take 1 tablet (20 mg total) by mouth 2 (two) times daily. Losartan Potassium (COZAAR) 25 MG Oral Tab Take 1 tablet (25 mg total) by mouth daily. Diagnoses for This Visit    Lumbar radiculopathy, right   [1474007]  -  Primary  Common peroneal neuropathy of right lower extremity   [[id number]]             We Ordered the Following     Normal Orders This Visit    EMG (At [facility]) [facility]]     Physical Therapy Referral [facility] Location [[id number] CUSTOM]       Future Appointments        Provider Department    11/22/2023 9:55 AM Jer Grant [place] [facility]    12/5/2023 9:00 AM [facility]                Did you know that [facility] primary care physicians now offer Video Visits through [address] for adult patients for a variety of conditions such as allergies, back pain and cold symptoms? Skip the drive and waiting room and online chat with a doctor face-to-face using your web-cam enabled computer or mobile device wherever you are.  Video Visits cost $50 and can be paid hassle-free using a credit, debit, or health savings card. Not active on [employer]? Ask us how to get signed up today! If you receive a survey from Kelsie Bonilla, please take a few minutes to complete it and provide feedback. We strive to deliver the best patient experience and are looking for ways to make improvements. Your feedback will help us do so. For more information on [employer], please visit [email or web address]           No text in SmartText           No text in SmartText

## (undated) NOTE — LETTER
Date: 2/13/2020    Patient Name: Zane Bring          To Whom it may concern: This letter has been written at the patient's request. The above patient was seen at the [facility] for treatment of a medical condition.     This patient shoul

## (undated) NOTE — LETTER
02/13/20    Rosibel Shed      To Whom It May Concern:     This letter has been written at the patient's request. The above's partner  Jamel Sainz was seen at [facility] for treatment of a medical condition on Thursday February 13th, Mishel sewell

## (undated) NOTE — IP AVS SNAPSHOT
[facility] [place] [address] [place], [address] ~ [phone]                Discharge Summary   3/14/2017    Matt Burnette           Admission Information        Provider Department    3/14/2017 Tanya Romeo MD  8ne-MATHEUS Saeed cyanocobalamin 1000 MCG/ML Soln   Commonly known as:  VITAMIN B12        Inject 1,000 mcg into the muscle every 30 (thirty) days.                          ferrous sulfate 325 (65 FE) MG [facility]   Last time this was given:  325 mg on 3/16/2017  8:00 AM Last time this was given:  20 mEq on 3/16/2017 12:55 PM   Commonly known as:  K-DUR M20        Take 20 mEq by mouth 4 (four) times daily.                                   QUEtiapine Fumarate 400 MG Tabs   Last time this was given:  400 mg on 3/15/2017 10:0 [address] [address]  [phone]        Immunization History as of 3/16/2017  Reviewed on 3/14/2017    No immunizations on file.       Recent Hematology Lab Results  (Last 3 results in the past 90 days)    WBC RBC Hemoglobin Hematocrit MCV MCH MCHC RDW Mike harming yourself, contact [facility] at [phone]. - If you don’t have insurance, Oly Bess has partnered with Patient [address] to help you get signed up for insurance coverage.   Patient Elizabeth your medications while at home.          Blood Pressure and Cardiac Medications     Losartan Potassium (COZAAR) 25 MG Oral Tab         Use: Treat abnormal blood pressure (high or low), cardiac conditions; and/or abnormal heart rates/rhythms   Most common si Use:  Nausea/vomiting, acid reflux, low bowel motility, stomach pain   Most common side effects:  Depends on the specific medication but generally include: diarrhea, constipation, headache, allergic reaction (itching, rash, hives)   What to report to your Most common side effects: Dizziness, drowsiness, problems with movement   What to report to your healthcare team: Changes in thinking, talking or movement, drowsiness, shaking           Calming and Sleep Medications     temazepam (RESTORIL) 30 MG Oral Cap

## (undated) NOTE — ED AVS SNAPSHOT
[facility] Emergency Department    [place]  [address] [address]    Phone:  [phone]    Fax:  Via Ryan Arceo   MRN: [id number]    Department:  [facility] Emergency Department   Date of Visit:  2/10/2 IF THERE IS ANY CHANGE OR WORSENING OF YOUR CONDITION, CALL YOUR PRIMARY CARE PHYSICIAN AT ONCE OR RETURN IMMEDIATELY TO THE EMERGENCY DEPARTMENT.     If you have been prescribed any medication(s), please fill your prescription right away and begin taking t

## (undated) NOTE — MR AVS SNAPSHOT
After Visit Summary   2/25/2021    Theresa Like    MRN: [id number]           Visit Information     Date & Time  2/25/2021  1:00 PM Provider  Norman Godoy MD Department  [address] [place] [address] Dept.  Phone  [phone] Were simvastatin (ZOCOR) 40 MG Oral Tab Take 40 mg by mouth nightly. Levothyroxine Sodium (SYNTHROID, LEVOTHROID) 25 MCG Oral Tab Take 50 mcg by mouth before breakfast.      torsemide (DEMADEX) 20 MG Oral Tab Take 20 mg by mouth 2 (two) times daily.       Lo Average cost  $35*     SAME DAY APPOINTMENTS   Available at primary care offices    AFTER HOURS CARE  [place]  OFFICE VISIT   Primary Care Providers  Treatment for mild illness or injury that does not require immediate attention.  Average cost  $70*   EXPRE

## (undated) NOTE — LETTER
[facility] [address], [address] [place] [address]    Consent for Anesthesia   1.    I, Afshan Coley agree to be cared for by an anesthesiologist, who is specially trained to monitor me and give me medicine to put me to sleep or keep me comfortabl vision, nerves, or muscles and in extremely rare instances death. 5. My doctor has explained to me other choices available to me for my care (alternatives).   6. Pregnant Patients (“epidural”):  I understand that the risks of having an epidural (medicine g

## (undated) NOTE — ED AVS SNAPSHOT
[facility] Emergency Department    [facility]  [address] [address]    Phone:  [phone]    Fax:  Lisette Lynn   MRN: [id number]    Department:  [facility] Emergency Department   Date of Visit:  2/10/2 Take 1 tablet (20 mg total) by mouth 4 (four) times daily as needed. ondansetron 4 MG Tbdp   Quantity:  20 tablet   Commonly known as:  ZOFRAN-ODT   Take 1 tablet (4 mg total) by mouth every 4 (four) hours as needed for Nausea.          CHANGE how you self-assessment the day after your visit. You may also receive a call from our patient liason soon after your visit. Also, some patients receive a detailed feedback survey mailed to them a week after the visit.   If you receive this, we would really apprec [facility] [address] ([address] [facility] [id number] 20[address] 139 ([address]) [facility] [address]. [facility]. ([address]) [facility] the pubic symphysis with non-ionic intravenous contrast material. Post contrast coronal MIP imaging was performed. Dose reduction techniques were used.  Dose information is   transmitted to the ACR ([address] [place] [address] of Radiology) Dorene Ibarra 35 [facility] Radiology D INDICATIONS:  gi bleed     COMPARISON:  [facility] , XR ABDOMEN (1 VIEW) (CPT=74000), 12/02/2015, 12:34.     TECHNIQUE:  Supine AP view was obtained. PATIENT STATED HISTORY:  Rectal bleeding for 2 days.          FINDINGS:    BOWEL GAS PATTERN:  Normal.  No

## (undated) NOTE — LETTER
21      RE: Shayna Adame     : 1955    Dear Dr. Lexy Zamudio,    This letter is to inform you that your patient is being scheduled for surgery with Dr. Belkys Anguiano on 10/27/21 at [facility]. We have asked the patient to contact your office to schedule a pre-operative exam/visit. Diagnosis: S/p lumbar fusion, lumbar radiculopathy   Procedure: EXPLORATION OF HARDWARE AT LUMBAR 4/ LUMBAR 5, EXPLORATION OF FUSION, ARTHRODESIS OF LUMBAR 4/LUMBAR 5, ALLOGRAFT, AUTOGRAFT     A Pre-operative History & Physical is needed for medical clearance. Please address patient's active problems (i.e high blood pressure, breathing issues etc.)    Pre-op labs are done through the [facility].. If any labs/testing are being done through the PCP office, then results should be faxed to the pre-admission testing department at [facility] [phone]. Please also fax clearance letter/office visit note to the Pre-Admission department or our office at fax #: [phone]. The following orders will be placed by pre-admission testing:  CXR  EKG  CBC  Comprehensive Metabolic Panel  Type and Screen (if applicable)  PT/PTT/INR  MRSA/MSSA Nasal Swab  Covid-19 testing  (*And any other pertinent testing based on patient's current clinical condition.)    If you have any questions, you may contact our office at [phone], option # 3.     Thank you,     Clinical Staff Nurse  DACIA VILLAFUERTE

## (undated) NOTE — LETTER
21        RE: aMrio Wilkerson     : 1955    Dear Dr. Annel Sr,    Cardiac clearance is being requested for surgery. Your patient is being scheduled for Exploration of hardware at L4 - L5, Exploration of fusion, arthrodesis of L4- L5, allograft, autograft on 21 with Dr. Renu Woodward. Pre-op labs will be done at THE [facility] and are scheduled by our [facility] department. Please fax your clearance to the pre-admission department at [address]. [phone] or our office [phone]    The following labs will be placed with the surgery order and are scheduled by Alcides Saldana:  CBC  Comprehensive Metabolic Panel  PT/PTT/INR  UA with reflex to culture  EKG  CXR  MRSA/MSSA Nasal Swab    If you have any questions, you may contact our office at [phone], option # 2.     Thank you,     Clinical Staff Nurse  DACIA VILLAFUERTE

## (undated) NOTE — LETTER
Patient Name: Kristal Mchugh / Sex: 12/4/1955-A: 79 y  female   Medical Records: [id number] CSN: [id number]    ABNORMAL VALUES  Surgeon(s):  Jaqueline Pittman MD  Anesthesia Type: Spinal  Date of Surgery: 6/5/2023  Procedure Description: REVISION RIGHT TOTAL KNEE REPLACEMENT  Primary Surgeon:  Jaqueline Ptitman MD  Phone Number: [phone]    PLEASE NOTE THE FOLLOWING ABNORMALITIES:   Other  ; abnormal nasal culture;       3+ MSSA  ________________________________________________________  Please initiate positive MSSA protocol

## (undated) NOTE — ED AVS SNAPSHOT
Giacomo Yoo   MRN: [id number]    Department:  [facility] Emergency Department   Date of Visit:  8/7/2017           Disclosure     Insurance plans vary and the physician(s) referred by the ER may not be covered by your plan.  Please contact your i If you have been prescribed any medication(s), please fill your prescription right away and begin taking the medication(s) as directed    If the emergency physician has read X-rays, these will be re-interpreted by a radiologist.  If there is a significant